# Patient Record
Sex: FEMALE | Race: WHITE | NOT HISPANIC OR LATINO | Employment: OTHER | URBAN - METROPOLITAN AREA
[De-identification: names, ages, dates, MRNs, and addresses within clinical notes are randomized per-mention and may not be internally consistent; named-entity substitution may affect disease eponyms.]

---

## 2023-10-11 ENCOUNTER — HOSPITAL ENCOUNTER (INPATIENT)
Facility: HOSPITAL | Age: 73
LOS: 9 days | Discharge: HOME | DRG: 737 | End: 2023-10-20
Attending: EMERGENCY MEDICINE | Admitting: INTERNAL MEDICINE
Payer: MEDICARE

## 2023-10-11 ENCOUNTER — APPOINTMENT (OUTPATIENT)
Dept: GENERAL RADIOLOGY | Facility: HOSPITAL | Age: 73
DRG: 737 | End: 2023-10-11
Payer: MEDICARE

## 2023-10-11 ENCOUNTER — APPOINTMENT (OUTPATIENT)
Dept: CT IMAGING | Facility: HOSPITAL | Age: 73
DRG: 737 | End: 2023-10-11
Payer: MEDICARE

## 2023-10-11 DIAGNOSIS — R18.8 OTHER ASCITES: ICD-10-CM

## 2023-10-11 DIAGNOSIS — R73.9 HYPERGLYCEMIA: ICD-10-CM

## 2023-10-11 DIAGNOSIS — K56.609 SBO (SMALL BOWEL OBSTRUCTION): ICD-10-CM

## 2023-10-11 DIAGNOSIS — R11.2 NAUSEA AND VOMITING, UNSPECIFIED VOMITING TYPE: ICD-10-CM

## 2023-10-11 DIAGNOSIS — K56.600 PARTIAL INTESTINAL OBSTRUCTION, UNSPECIFIED CAUSE: ICD-10-CM

## 2023-10-11 DIAGNOSIS — N83.8 OVARIAN MASS: ICD-10-CM

## 2023-10-11 DIAGNOSIS — N28.9 RENAL INSUFFICIENCY: ICD-10-CM

## 2023-10-11 DIAGNOSIS — R19.00 PELVIC MASS: Primary | ICD-10-CM

## 2023-10-11 PROBLEM — J44.9 COPD (CHRONIC OBSTRUCTIVE PULMONARY DISEASE): Status: ACTIVE | Noted: 2023-10-11

## 2023-10-11 PROBLEM — E11.9 TYPE 2 DIABETES MELLITUS: Status: ACTIVE | Noted: 2023-10-11

## 2023-10-11 PROBLEM — I50.21 ACUTE HFREF (HEART FAILURE WITH REDUCED EJECTION FRACTION): Status: ACTIVE | Noted: 2023-10-11

## 2023-10-11 PROBLEM — N18.30 CKD (CHRONIC KIDNEY DISEASE), STAGE III: Status: ACTIVE | Noted: 2023-10-11

## 2023-10-11 LAB
ALBUMIN SERPL-MCNC: 3.3 G/DL (ref 3.5–5.2)
ALBUMIN/GLOB SERPL: 0.8 G/DL
ALP SERPL-CCNC: 56 U/L (ref 39–117)
ALT SERPL W P-5'-P-CCNC: 13 U/L (ref 1–33)
ANION GAP SERPL CALCULATED.3IONS-SCNC: 20 MMOL/L (ref 5–15)
AST SERPL-CCNC: 31 U/L (ref 1–32)
BACTERIA UR QL AUTO: ABNORMAL /HPF
BASOPHILS # BLD AUTO: 0.03 10*3/MM3 (ref 0–0.2)
BASOPHILS NFR BLD AUTO: 0.2 % (ref 0–1.5)
BILIRUB SERPL-MCNC: 0.4 MG/DL (ref 0–1.2)
BILIRUB UR QL STRIP: ABNORMAL
BUN SERPL-MCNC: 58 MG/DL (ref 8–23)
BUN/CREAT SERPL: 33.7 (ref 7–25)
CALCIUM SPEC-SCNC: 8.5 MG/DL (ref 8.6–10.5)
CHLORIDE SERPL-SCNC: 91 MMOL/L (ref 98–107)
CLARITY UR: ABNORMAL
CO2 SERPL-SCNC: 22 MMOL/L (ref 22–29)
COLOR UR: ABNORMAL
CREAT BLDA-MCNC: 2.1 MG/DL
CREAT BLDA-MCNC: 2.1 MG/DL (ref 0.6–1.3)
CREAT SERPL-MCNC: 1.72 MG/DL (ref 0.57–1)
DEPRECATED RDW RBC AUTO: 45 FL (ref 37–54)
EGFRCR SERPLBLD CKD-EPI 2021: 31.3 ML/MIN/1.73
EOSINOPHIL # BLD AUTO: 0 10*3/MM3 (ref 0–0.4)
EOSINOPHIL NFR BLD AUTO: 0 % (ref 0.3–6.2)
ERYTHROCYTE [DISTWIDTH] IN BLOOD BY AUTOMATED COUNT: 15.2 % (ref 12.3–15.4)
GLOBULIN UR ELPH-MCNC: 3.9 GM/DL
GLUCOSE BLDC GLUCOMTR-MCNC: 310 MG/DL (ref 70–130)
GLUCOSE SERPL-MCNC: 358 MG/DL (ref 65–99)
GLUCOSE UR STRIP-MCNC: NEGATIVE MG/DL
HCT VFR BLD AUTO: 38.9 % (ref 34–46.6)
HGB BLD-MCNC: 12 G/DL (ref 12–15.9)
HGB UR QL STRIP.AUTO: ABNORMAL
HOLD SPECIMEN: NORMAL
HYALINE CASTS UR QL AUTO: ABNORMAL /LPF
IMM GRANULOCYTES # BLD AUTO: 0.13 10*3/MM3 (ref 0–0.05)
IMM GRANULOCYTES NFR BLD AUTO: 0.9 % (ref 0–0.5)
INR PPP: 1.36 (ref 0.89–1.12)
KETONES UR QL STRIP: ABNORMAL
LEUKOCYTE ESTERASE UR QL STRIP.AUTO: ABNORMAL
LIPASE SERPL-CCNC: 7 U/L (ref 13–60)
LYMPHOCYTES # BLD AUTO: 0.25 10*3/MM3 (ref 0.7–3.1)
LYMPHOCYTES NFR BLD AUTO: 1.7 % (ref 19.6–45.3)
MCH RBC QN AUTO: 25.4 PG (ref 26.6–33)
MCHC RBC AUTO-ENTMCNC: 30.8 G/DL (ref 31.5–35.7)
MCV RBC AUTO: 82.4 FL (ref 79–97)
MONOCYTES # BLD AUTO: 0.84 10*3/MM3 (ref 0.1–0.9)
MONOCYTES NFR BLD AUTO: 5.8 % (ref 5–12)
NEUTROPHILS NFR BLD AUTO: 13.15 10*3/MM3 (ref 1.7–7)
NEUTROPHILS NFR BLD AUTO: 91.4 % (ref 42.7–76)
NITRITE UR QL STRIP: NEGATIVE
NRBC BLD AUTO-RTO: 0 /100 WBC (ref 0–0.2)
NT-PROBNP SERPL-MCNC: 1889 PG/ML (ref 0–900)
PH UR STRIP.AUTO: <=5 [PH] (ref 5–8)
PLATELET # BLD AUTO: 585 10*3/MM3 (ref 140–450)
PMV BLD AUTO: 10.6 FL (ref 6–12)
POTASSIUM SERPL-SCNC: 4.5 MMOL/L (ref 3.5–5.2)
PROT SERPL-MCNC: 7.2 G/DL (ref 6–8.5)
PROT UR QL STRIP: ABNORMAL
PROTHROMBIN TIME: 16.9 SECONDS (ref 12.2–14.5)
QT INTERVAL: 364 MS
QTC INTERVAL: 505 MS
RBC # BLD AUTO: 4.72 10*6/MM3 (ref 3.77–5.28)
RBC # UR STRIP: ABNORMAL /HPF
REF LAB TEST METHOD: ABNORMAL
SODIUM SERPL-SCNC: 133 MMOL/L (ref 136–145)
SP GR UR STRIP: 1.02 (ref 1–1.03)
SQUAMOUS #/AREA URNS HPF: ABNORMAL /HPF
TROPONIN T SERPL HS-MCNC: 28 NG/L
UROBILINOGEN UR QL STRIP: ABNORMAL
WBC # UR STRIP: ABNORMAL /HPF
WBC NRBC COR # BLD: 14.4 10*3/MM3 (ref 3.4–10.8)
WHOLE BLOOD HOLD COAG: NORMAL
WHOLE BLOOD HOLD SPECIMEN: NORMAL

## 2023-10-11 PROCEDURE — 84484 ASSAY OF TROPONIN QUANT: CPT | Performed by: PHYSICIAN ASSISTANT

## 2023-10-11 PROCEDURE — 81001 URINALYSIS AUTO W/SCOPE: CPT | Performed by: PHYSICIAN ASSISTANT

## 2023-10-11 PROCEDURE — 74018 RADEX ABDOMEN 1 VIEW: CPT

## 2023-10-11 PROCEDURE — 83690 ASSAY OF LIPASE: CPT | Performed by: PHYSICIAN ASSISTANT

## 2023-10-11 PROCEDURE — 25010000002 ONDANSETRON PER 1 MG: Performed by: PHYSICIAN ASSISTANT

## 2023-10-11 PROCEDURE — 82565 ASSAY OF CREATININE: CPT

## 2023-10-11 PROCEDURE — 25010000002 ONDANSETRON PER 1 MG: Performed by: INTERNAL MEDICINE

## 2023-10-11 PROCEDURE — 85610 PROTHROMBIN TIME: CPT | Performed by: PHYSICIAN ASSISTANT

## 2023-10-11 PROCEDURE — 85025 COMPLETE CBC W/AUTO DIFF WBC: CPT | Performed by: PHYSICIAN ASSISTANT

## 2023-10-11 PROCEDURE — 71045 X-RAY EXAM CHEST 1 VIEW: CPT

## 2023-10-11 PROCEDURE — 25810000003 SODIUM CHLORIDE 0.9 % SOLUTION: Performed by: INTERNAL MEDICINE

## 2023-10-11 PROCEDURE — 80053 COMPREHEN METABOLIC PANEL: CPT | Performed by: PHYSICIAN ASSISTANT

## 2023-10-11 PROCEDURE — 82948 REAGENT STRIP/BLOOD GLUCOSE: CPT

## 2023-10-11 PROCEDURE — 25810000003 SODIUM CHLORIDE 0.9 % SOLUTION: Performed by: PHYSICIAN ASSISTANT

## 2023-10-11 PROCEDURE — 83880 ASSAY OF NATRIURETIC PEPTIDE: CPT | Performed by: PHYSICIAN ASSISTANT

## 2023-10-11 PROCEDURE — 86304 IMMUNOASSAY TUMOR CA 125: CPT | Performed by: INTERNAL MEDICINE

## 2023-10-11 PROCEDURE — 93005 ELECTROCARDIOGRAM TRACING: CPT | Performed by: PHYSICIAN ASSISTANT

## 2023-10-11 PROCEDURE — 74176 CT ABD & PELVIS W/O CONTRAST: CPT

## 2023-10-11 PROCEDURE — 99285 EMERGENCY DEPT VISIT HI MDM: CPT

## 2023-10-11 PROCEDURE — 25010000002 MORPHINE PER 10 MG: Performed by: INTERNAL MEDICINE

## 2023-10-11 RX ORDER — ALBUTEROL SULFATE 90 UG/1
2 AEROSOL, METERED RESPIRATORY (INHALATION) EVERY 4 HOURS PRN
COMMUNITY

## 2023-10-11 RX ORDER — NICOTINE POLACRILEX 4 MG
15 LOZENGE BUCCAL
Status: DISCONTINUED | OUTPATIENT
Start: 2023-10-11 | End: 2023-10-14

## 2023-10-11 RX ORDER — ONDANSETRON 2 MG/ML
4 INJECTION INTRAMUSCULAR; INTRAVENOUS ONCE
Status: COMPLETED | OUTPATIENT
Start: 2023-10-11 | End: 2023-10-11

## 2023-10-11 RX ORDER — INSULIN LISPRO 100 [IU]/ML
2-9 INJECTION, SOLUTION INTRAVENOUS; SUBCUTANEOUS
Status: DISCONTINUED | OUTPATIENT
Start: 2023-10-11 | End: 2023-10-12

## 2023-10-11 RX ORDER — DEXTROSE MONOHYDRATE 25 G/50ML
25 INJECTION, SOLUTION INTRAVENOUS
Status: DISCONTINUED | OUTPATIENT
Start: 2023-10-11 | End: 2023-10-20 | Stop reason: HOSPADM

## 2023-10-11 RX ORDER — HYDRALAZINE HYDROCHLORIDE 20 MG/ML
10 INJECTION INTRAMUSCULAR; INTRAVENOUS EVERY 6 HOURS PRN
Status: DISCONTINUED | OUTPATIENT
Start: 2023-10-11 | End: 2023-10-20 | Stop reason: HOSPADM

## 2023-10-11 RX ORDER — HEPARIN SODIUM 5000 [USP'U]/ML
5000 INJECTION, SOLUTION INTRAVENOUS; SUBCUTANEOUS EVERY 8 HOURS SCHEDULED
Status: DISCONTINUED | OUTPATIENT
Start: 2023-10-11 | End: 2023-10-15

## 2023-10-11 RX ORDER — DONEPEZIL HYDROCHLORIDE 5 MG/1
5 TABLET, FILM COATED ORAL NIGHTLY
Status: DISCONTINUED | OUTPATIENT
Start: 2023-10-11 | End: 2023-10-13

## 2023-10-11 RX ORDER — SODIUM CHLORIDE 0.9 % (FLUSH) 0.9 %
10 SYRINGE (ML) INJECTION EVERY 12 HOURS SCHEDULED
Status: DISCONTINUED | OUTPATIENT
Start: 2023-10-11 | End: 2023-10-20 | Stop reason: HOSPADM

## 2023-10-11 RX ORDER — LORATADINE 10 MG/1
10 TABLET ORAL DAILY
COMMUNITY

## 2023-10-11 RX ORDER — ONDANSETRON 2 MG/ML
4 INJECTION INTRAMUSCULAR; INTRAVENOUS EVERY 6 HOURS PRN
Status: DISCONTINUED | OUTPATIENT
Start: 2023-10-11 | End: 2023-10-20 | Stop reason: HOSPADM

## 2023-10-11 RX ORDER — BENZONATATE 100 MG/1
100 CAPSULE ORAL 3 TIMES DAILY PRN
COMMUNITY

## 2023-10-11 RX ORDER — PANTOPRAZOLE SODIUM 40 MG/1
40 TABLET, DELAYED RELEASE ORAL DAILY
Status: ON HOLD | COMMUNITY
End: 2023-10-13

## 2023-10-11 RX ORDER — SODIUM CHLORIDE 0.9 % (FLUSH) 0.9 %
10 SYRINGE (ML) INJECTION AS NEEDED
Status: DISCONTINUED | OUTPATIENT
Start: 2023-10-11 | End: 2023-10-20 | Stop reason: HOSPADM

## 2023-10-11 RX ORDER — SIMETHICONE 125 MG
125 TABLET,CHEWABLE ORAL EVERY 6 HOURS PRN
COMMUNITY

## 2023-10-11 RX ORDER — MONTELUKAST SODIUM 4 MG/1
1 TABLET, CHEWABLE ORAL 2 TIMES DAILY
COMMUNITY

## 2023-10-11 RX ORDER — DONEPEZIL HYDROCHLORIDE 5 MG/1
5 TABLET, FILM COATED ORAL NIGHTLY
COMMUNITY

## 2023-10-11 RX ORDER — FUROSEMIDE 40 MG/1
40 TABLET ORAL DAILY
Status: ON HOLD | COMMUNITY
End: 2023-10-20 | Stop reason: SDUPTHER

## 2023-10-11 RX ORDER — FAMOTIDINE 40 MG/1
40 TABLET, FILM COATED ORAL DAILY
COMMUNITY

## 2023-10-11 RX ORDER — CLONAZEPAM 0.5 MG/1
0.5 TABLET ORAL 2 TIMES DAILY
COMMUNITY

## 2023-10-11 RX ORDER — CHOLESTYRAMINE LIGHT 4 G/5.7G
1 POWDER, FOR SUSPENSION ORAL DAILY
Status: DISCONTINUED | OUTPATIENT
Start: 2023-10-11 | End: 2023-10-13

## 2023-10-11 RX ORDER — SODIUM CHLORIDE 9 MG/ML
50 INJECTION, SOLUTION INTRAVENOUS CONTINUOUS
Status: ACTIVE | OUTPATIENT
Start: 2023-10-11 | End: 2023-10-14

## 2023-10-11 RX ORDER — BACLOFEN 10 MG/1
10 TABLET ORAL 3 TIMES DAILY
COMMUNITY

## 2023-10-11 RX ORDER — MONTELUKAST SODIUM 10 MG/1
10 TABLET ORAL NIGHTLY
COMMUNITY

## 2023-10-11 RX ORDER — MORPHINE SULFATE 2 MG/ML
2 INJECTION, SOLUTION INTRAMUSCULAR; INTRAVENOUS EVERY 4 HOURS PRN
Status: DISCONTINUED | OUTPATIENT
Start: 2023-10-11 | End: 2023-10-15

## 2023-10-11 RX ORDER — FAMOTIDINE 20 MG/1
40 TABLET, FILM COATED ORAL DAILY
Status: DISCONTINUED | OUTPATIENT
Start: 2023-10-11 | End: 2023-10-12

## 2023-10-11 RX ORDER — SODIUM CHLORIDE 9 MG/ML
40 INJECTION, SOLUTION INTRAVENOUS AS NEEDED
Status: DISCONTINUED | OUTPATIENT
Start: 2023-10-11 | End: 2023-10-20 | Stop reason: HOSPADM

## 2023-10-11 RX ORDER — ACETAMINOPHEN 325 MG/1
650 TABLET ORAL EVERY 4 HOURS PRN
Status: DISCONTINUED | OUTPATIENT
Start: 2023-10-11 | End: 2023-10-14

## 2023-10-11 RX ORDER — SPIRONOLACTONE 25 MG/1
25 TABLET ORAL DAILY
COMMUNITY

## 2023-10-11 RX ORDER — IPRATROPIUM BROMIDE AND ALBUTEROL SULFATE 2.5; .5 MG/3ML; MG/3ML
3 SOLUTION RESPIRATORY (INHALATION) EVERY 4 HOURS PRN
Status: DISCONTINUED | OUTPATIENT
Start: 2023-10-11 | End: 2023-10-20 | Stop reason: HOSPADM

## 2023-10-11 RX ADMIN — ONDANSETRON 4 MG: 2 INJECTION INTRAMUSCULAR; INTRAVENOUS at 23:23

## 2023-10-11 RX ADMIN — SODIUM CHLORIDE 50 ML/HR: 9 INJECTION, SOLUTION INTRAVENOUS at 23:22

## 2023-10-11 RX ADMIN — ONDANSETRON 4 MG: 2 INJECTION INTRAMUSCULAR; INTRAVENOUS at 13:58

## 2023-10-11 RX ADMIN — ONDANSETRON 4 MG: 2 INJECTION INTRAMUSCULAR; INTRAVENOUS at 16:11

## 2023-10-11 RX ADMIN — MORPHINE SULFATE 2 MG: 2 INJECTION, SOLUTION INTRAMUSCULAR; INTRAVENOUS at 23:23

## 2023-10-11 RX ADMIN — Medication 10 ML: at 23:23

## 2023-10-11 RX ADMIN — SODIUM CHLORIDE 1000 ML: 9 INJECTION, SOLUTION INTRAVENOUS at 14:56

## 2023-10-11 NOTE — ED PROVIDER NOTES
Subjective   History of Present Illness  Pt is a 71 yo female presenting to ED with complaints of vomiting and abdominal distension. PMHx significant for CHF, COPD, Pacemaker, Defib, CKD and HTN. Pt and son providing hx. They explain she has had intermittent vaginal bleeding for the past 5 months. She has had gradually worsening abdominal pain with distension and vomiting over the last month. She had a recent CT scan several weeks ago showing large mass in uterine and ascites. Patient had a DNC 2 days ago by OB Dr. Bina Zamudio in [place] and waiting for biopsy result but reports recent positive cancer markers. Son reports her cardiologist only cleared her for the DNC and not a full hysterectomy. She came to [place] because no oncologist in [place]. She denies prior hx of known cancer. Her prior abdominal surgical hx includes cholecystectomy. She has had some SOB with her abdominal distension but denies CP, cough or fever. She is constipated and last pasted pebble like stool several days ago. She quit using tobacco in 1998 and denies ETOH or drug use.     History provided by:  Patient, medical records and relative (SON)      Review of Systems   Constitutional:  Positive for appetite change and fatigue. Negative for fever.   HENT:  Negative for congestion.    Eyes:  Negative for visual disturbance.   Respiratory:  Positive for shortness of breath. Negative for cough.    Cardiovascular:  Negative for chest pain and leg swelling.   Gastrointestinal:  Positive for abdominal distention, abdominal pain, constipation, nausea and vomiting. Negative for diarrhea and rectal pain.   Genitourinary:  Positive for flank pain. Negative for difficulty urinating, dysuria and frequency.   Neurological:  Positive for weakness (generalized). Negative for dizziness, numbness and headaches.   Psychiatric/Behavioral:  Negative for confusion.        Past Medical History:   Diagnosis Date    Arthritis     CHF (congestive heart  failure)     COPD (chronic obstructive pulmonary disease)     Diabetes mellitus     Hyperlipidemia     Renal disorder        Allergies   Allergen Reactions    Paxil [Paroxetine] Anxiety       Past Surgical History:   Procedure Laterality Date    CHOLECYSTECTOMY      DILATATION AND CURETTAGE         History reviewed. No pertinent family history.    Social History     Socioeconomic History    Marital status:    Tobacco Use    Smoking status: Former     Types: Cigarettes     Quit date:      Years since quittin.8    Smokeless tobacco: Never   Vaping Use    Vaping Use: Never used   Substance and Sexual Activity    Alcohol use: Not Currently    Drug use: Never    Sexual activity: Defer           Objective   Physical Exam  Vitals and nursing note reviewed.   Constitutional:       General: She is not in acute distress.     Appearance: She is well-developed.   HENT:      Head: Atraumatic.      Nose: Nose normal.   Eyes:      General: Lids are normal.      Conjunctiva/sclera: Conjunctivae normal.      Pupils: Pupils are equal, round, and reactive to light.   Cardiovascular:      Rate and Rhythm: Regular rhythm. Tachycardia present.      Heart sounds: Normal heart sounds.   Pulmonary:      Effort: Pulmonary effort is normal.      Breath sounds: Normal breath sounds. No wheezing.   Abdominal:      General: There is distension.      Palpations: Abdomen is soft.      Tenderness: There is generalized abdominal tenderness. There is right CVA tenderness and left CVA tenderness. There is no guarding or rebound.   Musculoskeletal:         General: No tenderness. Normal range of motion.      Cervical back: Normal range of motion and neck supple.   Skin:     General: Skin is warm and dry.      Findings: No erythema or rash.   Neurological:      Mental Status: She is alert and oriented to person, place, and time.      Sensory: No sensory deficit.   Psychiatric:         Speech: Speech normal.         Behavior: Behavior  normal.         Procedures           ED Course  ED Course as of 10/11/23 2312   Wed Oct 11, 2023   1355 Creatinine: 2.10  Changed CT abd/pelvis to without contrast [RT]      ED Course User Index  [RT] Nasrin Goodman PA      Recent Results (from the past 24 hour(s))   Comprehensive Metabolic Panel    Collection Time: 10/11/23  1:40 PM    Specimen: Blood   Result Value Ref Range    Glucose 358 (H) 65 - 99 mg/dL    BUN 58 (H) 8 - 23 mg/dL    Creatinine 1.72 (H) 0.57 - 1.00 mg/dL    Sodium 133 (L) 136 - 145 mmol/L    Potassium 4.5 3.5 - 5.2 mmol/L    Chloride 91 (L) 98 - 107 mmol/L    CO2 22.0 22.0 - 29.0 mmol/L    Calcium 8.5 (L) 8.6 - 10.5 mg/dL    Total Protein 7.2 6.0 - 8.5 g/dL    Albumin 3.3 (L) 3.5 - 5.2 g/dL    ALT (SGPT) 13 1 - 33 U/L    AST (SGOT) 31 1 - 32 U/L    Alkaline Phosphatase 56 39 - 117 U/L    Total Bilirubin 0.4 0.0 - 1.2 mg/dL    Globulin 3.9 gm/dL    A/G Ratio 0.8 g/dL    BUN/Creatinine Ratio 33.7 (H) 7.0 - 25.0    Anion Gap 20.0 (H) 5.0 - 15.0 mmol/L    eGFR 31.3 (L) >60.0 mL/min/1.73   Lipase    Collection Time: 10/11/23  1:40 PM    Specimen: Blood   Result Value Ref Range    Lipase 7 (L) 13 - 60 U/L   CBC Auto Differential    Collection Time: 10/11/23  1:40 PM    Specimen: Blood   Result Value Ref Range    WBC 14.40 (H) 3.40 - 10.80 10*3/mm3    RBC 4.72 3.77 - 5.28 10*6/mm3    Hemoglobin 12.0 12.0 - 15.9 g/dL    Hematocrit 38.9 34.0 - 46.6 %    MCV 82.4 79.0 - 97.0 fL    MCH 25.4 (L) 26.6 - 33.0 pg    MCHC 30.8 (L) 31.5 - 35.7 g/dL    RDW 15.2 12.3 - 15.4 %    RDW-SD 45.0 37.0 - 54.0 fl    MPV 10.6 6.0 - 12.0 fL    Platelets 585 (H) 140 - 450 10*3/mm3    Neutrophil % 91.4 (H) 42.7 - 76.0 %    Lymphocyte % 1.7 (L) 19.6 - 45.3 %    Monocyte % 5.8 5.0 - 12.0 %    Eosinophil % 0.0 (L) 0.3 - 6.2 %    Basophil % 0.2 0.0 - 1.5 %    Immature Grans % 0.9 (H) 0.0 - 0.5 %    Neutrophils, Absolute 13.15 (H) 1.70 - 7.00 10*3/mm3    Lymphocytes, Absolute 0.25 (L) 0.70 - 3.10 10*3/mm3    Monocytes, Absolute  0.84 0.10 - 0.90 10*3/mm3    Eosinophils, Absolute 0.00 0.00 - 0.40 10*3/mm3    Basophils, Absolute 0.03 0.00 - 0.20 10*3/mm3    Immature Grans, Absolute 0.13 (H) 0.00 - 0.05 10*3/mm3    nRBC 0.0 0.0 - 0.2 /100 WBC   Green Top (Gel)    Collection Time: 10/11/23  1:40 PM   Result Value Ref Range    Extra Tube Hold for add-ons.    Lavender Top    Collection Time: 10/11/23  1:40 PM   Result Value Ref Range    Extra Tube hold for add-on    Gold Top - SST    Collection Time: 10/11/23  1:40 PM   Result Value Ref Range    Extra Tube Hold for add-ons.    Gray Top    Collection Time: 10/11/23  1:40 PM   Result Value Ref Range    Extra Tube Hold for add-ons.    Light Blue Top    Collection Time: 10/11/23  1:40 PM   Result Value Ref Range    Extra Tube Hold for add-ons.    Protime-INR    Collection Time: 10/11/23  1:40 PM    Specimen: Blood   Result Value Ref Range    Protime 16.9 (H) 12.2 - 14.5 Seconds    INR 1.36 (H) 0.89 - 1.12   BNP    Collection Time: 10/11/23  1:40 PM    Specimen: Blood   Result Value Ref Range    proBNP 1,889.0 (H) 0.0 - 900.0 pg/mL   Single High Sensitivity Troponin T    Collection Time: 10/11/23  1:40 PM    Specimen: Blood   Result Value Ref Range    HS Troponin T 28 (H) <10 ng/L   Urinalysis With Microscopic If Indicated (No Culture) - Urine, Clean Catch    Collection Time: 10/11/23  1:44 PM    Specimen: Urine, Clean Catch   Result Value Ref Range    Color, UA Dark Yellow (A) Yellow, Straw    Appearance, UA Turbid (A) Clear    pH, UA <=5.0 5.0 - 8.0    Specific Gravity, UA 1.023 1.001 - 1.030    Glucose, UA Negative Negative    Ketones, UA Trace (A) Negative    Bilirubin, UA Moderate (2+) (A) Negative    Blood, UA Trace (A) Negative    Protein, UA 30 mg/dL (1+) (A) Negative    Leuk Esterase, UA Moderate (2+) (A) Negative    Nitrite, UA Negative Negative    Urobilinogen, UA 0.2 E.U./dL 0.2 - 1.0 E.U./dL   Urinalysis, Microscopic Only - Urine, Clean Catch    Collection Time: 10/11/23  1:44 PM     Specimen: Urine, Clean Catch   Result Value Ref Range    RBC, UA 3-6 (A) None Seen, 0-2 /HPF    WBC, UA Too Numerous to Count (A) None Seen, 0-2 /HPF    Bacteria, UA 1+ (A) None Seen, Trace /HPF    Squamous Epithelial Cells, UA 21-30 (A) None Seen, 0-2 /HPF    Hyaline Casts, UA 13-20 0 - 6 /LPF    Methodology Manual Light Microscopy    POC Creatinine    Collection Time: 10/11/23  1:47 PM    Specimen: Blood   Result Value Ref Range    Creatinine 2.10 (H) 0.60 - 1.30 mg/dL   ECG 12 Lead Rhythm Change    Collection Time: 10/11/23  1:48 PM   Result Value Ref Range    QT Interval 364 ms    QTC Interval 505 ms   POCT, Creatinine    Collection Time: 10/11/23  1:54 PM    Specimen: Blood   Result Value Ref Range    Creatinine 2.10 mg/dL   POC Glucose Once    Collection Time: 10/11/23  7:01 PM    Specimen: Blood   Result Value Ref Range    Glucose 310 (H) 70 - 130 mg/dL     Note: In addition to lab results from this visit, the labs listed above may include labs taken at another facility or during a different encounter within the last 24 hours. Please correlate lab times with ED admission and discharge times for further clarification of the services performed during this visit.    XR Abdomen KUB   Final Result   Impression:   There is nasogastric tube with tip in the body of the stomach         Electronically Signed: Tres Bauer MD     10/11/2023 6:26 PM CDT     Workstation ID: [id number]      CT Abdomen Pelvis Without Contrast   Final Result   Impression:   1.Large central abdominopelvic mass, likely arising from the right ovary.   2.Dilated small intestine with somewhat gradual transition in the right anterior lower abdomen near the after mentioned mass suggestive of at least partial mass effect/obstruction.   3.Small to moderate volume ascites with imaging features concerning for carcinomatosis.   4.Enlarged retroperitoneal lymph nodes, likely metastatic.                  Electronically Signed: Tres Bauer MD      10/11/2023 3:09 PM CDT     Workstation ID: [id number]      XR Chest 1 View   Final Result   Impression:   No acute cardiopulmonary process.         Electronically Signed: Albert Rangel MD     10/11/2023 2:46 PM EDT     Workstation ID: [id number]        Vitals:    10/11/23 1800 10/11/23 1858 10/11/23 2031 10/11/23 2100   BP: 126/65 148/91 129/79    BP Location:  Left arm Left arm    Patient Position:  Lying Lying    Pulse: 108 120  112   Resp:  18 18 18   Temp:  98.4 °F (36.9 °C) 98.4 °F (36.9 °C)    TempSrc:  Oral Oral    SpO2: 92% 93%  92%   Weight:       Height:         Medications   sodium chloride 0.9 % flush 10 mL (has no administration in time range)   cholestyramine light packet 4 g (0 g Oral Hold 10/11/23 2048)   donepezil (ARICEPT) tablet 5 mg (0 mg Oral Hold 10/11/23 2253)   famotidine (PEPCID) tablet 40 mg (0 mg Oral Hold 10/11/23 2048)   sodium chloride 0.9 % flush 10 mL (has no administration in time range)   sodium chloride 0.9 % flush 10 mL (has no administration in time range)   sodium chloride 0.9 % infusion 40 mL (has no administration in time range)   dextrose (GLUTOSE) oral gel 15 g (has no administration in time range)   dextrose (D50W) (25 g/50 mL) IV injection 25 g (has no administration in time range)   glucagon (GLUCAGEN) injection 1 mg (has no administration in time range)   Insulin Lispro (humaLOG) injection 2-9 Units (0 Units Subcutaneous Hold 10/11/23 2254)   heparin (porcine) 5000 UNIT/ML injection 5,000 Units (has no administration in time range)   sodium chloride 0.9 % infusion (has no administration in time range)   acetaminophen (TYLENOL) tablet 650 mg (has no administration in time range)   ondansetron (ZOFRAN) injection 4 mg (has no administration in time range)   hydrALAZINE (APRESOLINE) injection 10 mg (has no administration in time range)   morphine injection 2 mg (has no administration in time range)   ipratropium-albuterol (DUO-NEB) nebulizer solution 3 mL (has no  administration in time range)   ondansetron (ZOFRAN) injection 4 mg (4 mg Intravenous Given 10/11/23 1358)   sodium chloride 0.9 % bolus 1,000 mL (0 mL Intravenous Stopped 10/11/23 1611)   ondansetron (ZOFRAN) injection 4 mg (4 mg Intravenous Given 10/11/23 1611)     ECG/EMG Results (last 24 hours)       ** No results found for the last 24 hours. **          ECG 12 Lead Rhythm Change   Final Result   Test Reason : Rhythm Change   Blood Pressure :   */*   mmHG   Vent. Rate : 116 BPM     Atrial Rate : 116 BPM      P-R Int : 142 ms          QRS Dur :  96 ms       QT Int : 364 ms       P-R-T Axes :  49 -29 106 degrees      QTc Int : 505 ms      Atrial-sensed ventricular-paced rhythm   Biventricular pacemaker detected   Abnormal ECG   No previous ECGs available   Confirmed by PEGGY FLEMING MD ([id number]) on 10/11/2023 2:19:52 PM      Referred By:            Confirmed By: PEGGY FLEMING MD                                               Medical Decision Making  Pt is a 71 yo female presenting to ED with complaints of abdominal pain, distension and vomiting with vaginal bleeding. Labs notable for WBC 14, Cr 1.72, glucose 358, K 4.5, Na 133, HS Trop 28 and BNP 1,889. CT abd/pelvis with large pelvic mass and also concern for partial bowel obstruction as well as ascites and enlarged lymph nodes. Discussed results and tx plan for admission with patient and family. Will place NG tube in ED and admit to hospital.     Discussed patient with Dr. Fleming who is agreeable with ED course and tx plan    Discussed patient with hospitalist Dr. Lujan.     Consulted gynonc Dr. Draper and she recommends NG and will see in consult.     DDx  Metastatic cancer, SBO, UTI, Ascites, Cirrhosis, Renal failure, Sepsis     Problems Addressed:  Hyperglycemia: complicated acute illness or injury  Nausea and vomiting, unspecified vomiting type: complicated acute illness or injury  Other ascites: complicated acute illness or injury  Partial intestinal  obstruction, unspecified cause: complicated acute illness or injury  Pelvic mass: complicated acute illness or injury  Renal insufficiency: complicated acute illness or injury    Amount and/or Complexity of Data Reviewed  Independent Historian:      Details: Son  External Data Reviewed: notes.     Details: OB, PCP  Labs: ordered. Decision-making details documented in ED Course.  Radiology: ordered. Decision-making details documented in ED Course.  ECG/medicine tests: ordered. Decision-making details documented in ED Course.    Risk  Prescription drug management.  Decision regarding hospitalization.        Final diagnoses:   Pelvic mass   Partial intestinal obstruction, unspecified cause   Nausea and vomiting, unspecified vomiting type   Other ascites   Hyperglycemia   Renal insufficiency       ED Disposition  ED Disposition       ED Disposition   Decision to Admit    Condition   --    Comment   Level of Care: Telemetry [5]   Diagnosis: Ovarian mass [955513]   Admitting Physician: YAIR RAMIREZ [[id number]]   Certification: I Certify That Inpatient Hospital Services Are Medically Necessary For Greater Than 2 Midnights                 No follow-up provider specified.       Medication List      No changes were made to your prescriptions during this visit.            Nasrin Goodman PA  10/11/23 0310

## 2023-10-11 NOTE — H&P
[facility] Medicine Services  HISTORY AND PHYSICAL    Patient Name: Iqra Britt  : 1950  MRN: [id number]  Primary Care Physician: Georgie Fernández DO  Date of admission: 10/11/2023      Subjective   Subjective     Chief Complaint:  Nausea/vomiting, abdominal distention    HPI:  Iqra Britt is a 72 y.o. female with PMHx significant for HFrEF s/p ICD (LVEF 26%), HTN, CKDIII, Arthritis, DMII, COPD who presents to [facility] with complaints of abdominal distention and nausea/vomiting, abdominal pain. Patient reports in May she developed worsening vaginal bleeding, she underwent endometrial biopsy that was negative for cancer. She continued to have on/off symptoms and underwent D&C on Monday that reportedly showed abnormal fluid in her uterus, pathology is still pending. About three weeks ago she developed progressive abdominal swelling and distention and over the course of the last few days she has developed increasing pain and nausea. Son notes significant weight loss and poor appetite over the last several months. CT in the ED with SBO and large ovarian mass w/ascites.      Personal History     Past Medical History:   Diagnosis Date    Arthritis     CHF (congestive heart failure)     COPD (chronic obstructive pulmonary disease)     Diabetes mellitus     Hyperlipidemia     Renal disorder              Past Surgical History:   Procedure Laterality Date    CHOLECYSTECTOMY      DILATATION AND CURETTAGE         Family History: family history is not on file.     Social History:  reports that she quit smoking about 35 years ago. Her smoking use included cigarettes. She has never used smokeless tobacco. She reports that she does not currently use alcohol. She reports that she does not use drugs.  Social History     Social History Narrative    Not on file       Medications:  Available home medication information reviewed.  (Not in a hospital admission)      Allergies    Allergen Reactions    Paxil [Paroxetine] Anxiety       Objective   Objective     Vital Signs:   Temp:  [97.7 °F (36.5 °C)-98.3 °F (36.8 °C)] 98.3 °F (36.8 °C)  Heart Rate:  [103-125] 109  Resp:  [20] 20  BP: (119-133)/(73-91) 119/74       Physical Exam   Constitutional: Awake, alert  Eyes: PERRLA, sclerae anicteric, no conjunctival injection  HENT: NCAT, mucous membranes moist  Neck: Supple, no thyromegaly, no lymphadenopathy, trachea midline  Respiratory: Clear to auscultation bilaterally, nonlabored respirations   Cardiovascular: RRR, no murmurs, rubs, or gallops, palpable pedal pulses bilaterally  Gastrointestinal: significantly distended abdomen with ascites present, mostly non-tender  Musculoskeletal: No bilateral ankle edema, no clubbing or cyanosis to extremities  Psychiatric: Appropriate affect, cooperative  Neurologic: Oriented x 3, strength symmetric in all extremities, Cranial Nerves grossly intact to confrontation, speech clear  Skin: No rashes      Result Review:  I have personally reviewed the results from the time of this admission to 10/11/2023 17:22 EDT and agree with these findings:  [x]  Laboratory list / accordion  [x]  Microbiology  [x]  Radiology  [x]  EKG/Telemetry   [x]  Cardiology/Vascular   [x]  Pathology  [x]  Old records  []  Other:  Most notable findings include:         LAB RESULTS:      Lab 10/11/23  1340   WBC 14.40*   HEMOGLOBIN 12.0   HEMATOCRIT 38.9   PLATELETS 585*   NEUTROS ABS 13.15*   IMMATURE GRANS (ABS) 0.13*   LYMPHS ABS 0.25*   MONOS ABS 0.84   EOS ABS 0.00   MCV 82.4   PROTIME 16.9*   INR 1.36*         Lab 10/11/23  1354 10/11/23  1347 10/11/23  1340   SODIUM  --   --  133*   POTASSIUM  --   --  4.5   CHLORIDE  --   --  91*   CO2  --   --  22.0   ANION GAP  --   --  20.0*   BUN  --   --  58*   CREATININE 2.10 2.10* 1.72*   EGFR  --   --  31.3*   GLUCOSE  --   --  358*   CALCIUM  --   --  8.5*         Lab 10/11/23  1340   TOTAL PROTEIN 7.2   ALBUMIN 3.3*   GLOBULIN 3.9    ALT (SGPT) 13   AST (SGOT) 31   BILIRUBIN 0.4   ALK PHOS 56   LIPASE 7*         Lab 10/11/23  1340   PROBNP 1,889.0*   HSTROP T 28*                 UA          10/11/2023    13:44   Urinalysis   Squamous Epithelial Cells, UA 21-30    Specific Gravity, UA 1.023    Ketones, UA Trace    Blood, UA Trace    Leukocytes, UA Moderate (2+)    Nitrite, UA Negative    RBC, UA 3-6    WBC, UA Too Numerous to Count    Bacteria, UA 1+        Microbiology Results (last 10 days)       ** No results found for the last 240 hours. **            CT Abdomen Pelvis Without Contrast    Result Date: 10/11/2023  CT ABDOMEN PELVIS WO CONTRAST Date of Exam: 10/11/2023 2:43 PM CDT Indication: abd pain, distension, recent dx of uterine mass, Cr 2.1. Comparison: None available. Technique: Axial CT images were obtained of the abdomen and pelvis without the administration of contrast. Reconstructed coronal and sagittal images were also obtained. Automated exposure control and iterative construction methods were used. Findings: LUNG BASES:  Unremarkable without mass or infiltrate. LIVER:  Unremarkable parenchyma without focal lesion. BILIARY/GALLBLADDER: Cholecystectomy SPLEEN:  Unremarkable PANCREAS:  Unremarkable ADRENAL:  Unremarkable KIDNEYS:  Unremarkable parenchyma with no solid mass identified. No obstruction.  No calculus identified. GASTROINTESTINAL/MESENTERY: Dilated small intestine measuring up to 4 cm in diameter with somewhat gradual tapering in the right central lower abdomen in the region of abdominopelvic mass. There is likely an element of mass effect/partial obstruction related to the mass. Distal small intestine is decompressed. There is a moderate proximal and mid fecal load. AORTA/IVC:  Normal caliber. RETROPERITONEUM/LYMPH NODES: There are enlarged retroperitoneal lymph nodes including: *2.2 x 2.0 cm left para-aortic mid abdominal node (image 64, series 2) *1.5 x 1.4 cm left para-aortic lower abdominal node (image 70, series  2) REPRODUCTIVE: There is a heterogeneous 14.9 x 11.7 cm mass within the central upper pelvis/lower abdomen. While this is adjacent to the uterine fundus it does not appear to be in direct communication and is most suggestive of an ovarian neoplasm likely arising from the right ovary. There is a heterogeneously enlarged left ovary measuring 5.0 x 4.6 cm. BLADDER:  Unremarkable OSSEUS STRUCTURES:  Typical for age with no acute process identified. There is small to moderate volume ascites. There are areas of mesenteric spider webbing/infiltration with some suggestion of nodularity worrisome for carcinomatosis. However, this is difficult to assess given lack of intravenous contrast.     Impression: Impression: 1.Large central abdominopelvic mass, likely arising from the right ovary. 2.Dilated small intestine with somewhat gradual transition in the right anterior lower abdomen near the after mentioned mass suggestive of at least partial mass effect/obstruction. 3.Small to moderate volume ascites with imaging features concerning for carcinomatosis. 4.Enlarged retroperitoneal lymph nodes, likely metastatic. Electronically Signed: Tres Bauer MD  10/11/2023 3:09 PM CDT  Workstation ID: [id number]    XR Chest 1 View    Result Date: 10/11/2023  XR CHEST 1 VW Date of Exam: 10/11/2023 2:10 PM EDT Indication: abd pain, distension, recent dx of uterine cancer, ascites Comparison: None available. Findings: A left subclavian ICD is in place. The lungs are clear. The heart and mediastinal contours appear normal. There is no pleural effusion. The pulmonary vasculature appears normal. The osseous structures appear intact.     Impression: Impression: No acute cardiopulmonary process. Electronically Signed: Albert Rangel MD  10/11/2023 2:46 PM EDT  Workstation ID: [id number]         Assessment & Plan   Assessment & Plan     Active Hospital Problems    Diagnosis  POA    **Ovarian mass [N83.8]  Yes    COPD (chronic obstructive pulmonary  disease) [J44.9]  Yes    Acute HFrEF (heart failure with reduced ejection fraction) [I50.21]  Yes    CKD (chronic kidney disease), stage III [N18.30]  Yes     Iqra Britt is a 72 y.o. female with PMHx significant for HFrEF/NICM s/p BiV ICD LVEF 26%), HTN, CKDIII, Arthritis, DMII, COPD who presents to [facility] with complaints of abdominal distention and nausea/vomiting, abdominal pain.    SBO:  - secondary to large ovarian mass  - Dr. Draper to evaluate, recommends placing NGT in ED  - NPO, gentle IVF for now  - PRN pain control    Large Pelvic Mass w/Ascites and concern for Carcinomatosis and metastatic LAD  - , CEA, CA-125 all significantly elevated per review of OSH records, recheck CA-125  - concern for primary Ovarian Malignancy w/Malignant Ascites  - Dr. Draper to evaluate, may need surgical debulking?   - recent negative endometrial bx 5/2023 and recent D&C Monday w/path still pending  - given her comorbidities, she is not a great surgical candidate    CKDIII  - baseline appears to be around 1.5?  - monitor closely  - avoid nephrotoxins, hold lasix while NPO    HFrEF/NICM s/p BiV ICD (LVEF 26% 9/2023)  - followed by Cardiology at   - does not appear to be on GDMT, will hold lasix for now due to NPO/SBO, need to have pharmacy clarify med rec, looks like she may have recently been started on Entresto, Ibravadine?  - monitor for volume overload, appears euvolemic currently    Possible UTI:  - sample contaminated with significant squamous cells, will repeat and treat if indicated    COPD:  - not in exacerbation  - PRN duo-nebs    DMII:  - SSI coverage while NPO    Total time spent: 80 minutes  Time spent includes time reviewing chart, face-to-face time, counseling patient/family/caregiver, ordering medications/tests/procedures, communicating with other health care professionals, documenting clinical information in the electronic health record, and coordination of care.       DVT prophylaxis:   Heparin      CODE STATUS:  we discussed this extensively and she chooses to be full code at this time  Code Status and Medical Interventions:   Ordered at: 10/11/23 0831     Level Of Support Discussed With:    Patient     Code Status (Patient has no pulse and is not breathing):    CPR (Attempt to Resuscitate)     Medical Interventions (Patient has pulse or is breathing):    Full Support       Expected Discharge   Expected discharge date/ time has not been documented.     Judy Lujan, DO  10/11/23

## 2023-10-12 ENCOUNTER — APPOINTMENT (OUTPATIENT)
Dept: ULTRASOUND IMAGING | Facility: HOSPITAL | Age: 73
DRG: 737 | End: 2023-10-12
Payer: MEDICARE

## 2023-10-12 LAB
ALBUMIN FLD-MCNC: 2.2 G/DL
ALBUMIN SERPL-MCNC: 2.5 G/DL (ref 3.5–5.2)
ALBUMIN/GLOB SERPL: 0.7 G/DL
ALP SERPL-CCNC: 46 U/L (ref 39–117)
ALT SERPL W P-5'-P-CCNC: 10 U/L (ref 1–33)
ANION GAP SERPL CALCULATED.3IONS-SCNC: 12 MMOL/L (ref 5–15)
APPEARANCE FLD: ABNORMAL
AST SERPL-CCNC: 30 U/L (ref 1–32)
BACTERIA UR QL AUTO: ABNORMAL /HPF
BASOPHILS # BLD MANUAL: 0 10*3/MM3 (ref 0–0.2)
BASOPHILS NFR BLD MANUAL: 0 % (ref 0–1.5)
BILIRUB SERPL-MCNC: 0.4 MG/DL (ref 0–1.2)
BILIRUB UR QL STRIP: NEGATIVE
BUN SERPL-MCNC: 58 MG/DL (ref 8–23)
BUN/CREAT SERPL: 38.2 (ref 7–25)
CALCIUM SPEC-SCNC: 7.9 MG/DL (ref 8.6–10.5)
CANCER AG125 SERPL QL: 776 U/ML (ref 0–38.1)
CHLORIDE SERPL-SCNC: 95 MMOL/L (ref 98–107)
CLARITY UR: ABNORMAL
CLUMPED PLATELETS: PRESENT
CO2 SERPL-SCNC: 23 MMOL/L (ref 22–29)
COLOR FLD: ABNORMAL
COLOR UR: YELLOW
CREAT SERPL-MCNC: 1.52 MG/DL (ref 0.57–1)
DEPRECATED RDW RBC AUTO: 44.8 FL (ref 37–54)
EGFRCR SERPLBLD CKD-EPI 2021: 36.3 ML/MIN/1.73
EOSINOPHIL # BLD MANUAL: 0 10*3/MM3 (ref 0–0.4)
EOSINOPHIL NFR BLD MANUAL: 0 % (ref 0.3–6.2)
ERYTHROCYTE [DISTWIDTH] IN BLOOD BY AUTOMATED COUNT: 15.4 % (ref 12.3–15.4)
GLOBULIN UR ELPH-MCNC: 3.6 GM/DL
GLUCOSE BLDC GLUCOMTR-MCNC: 177 MG/DL (ref 70–130)
GLUCOSE BLDC GLUCOMTR-MCNC: 208 MG/DL (ref 70–130)
GLUCOSE BLDC GLUCOMTR-MCNC: 309 MG/DL (ref 70–130)
GLUCOSE BLDC GLUCOMTR-MCNC: 313 MG/DL (ref 70–130)
GLUCOSE BLDC GLUCOMTR-MCNC: 322 MG/DL (ref 70–130)
GLUCOSE SERPL-MCNC: 285 MG/DL (ref 65–99)
GLUCOSE UR STRIP-MCNC: NEGATIVE MG/DL
HBA1C MFR BLD: 8.8 % (ref 4.8–5.6)
HCT VFR BLD AUTO: 34.2 % (ref 34–46.6)
HGB BLD-MCNC: 10.8 G/DL (ref 12–15.9)
HGB UR QL STRIP.AUTO: ABNORMAL
HYALINE CASTS UR QL AUTO: ABNORMAL /LPF
KETONES UR QL STRIP: ABNORMAL
LEUKOCYTE ESTERASE UR QL STRIP.AUTO: ABNORMAL
LYMPHOCYTES # BLD MANUAL: 0.48 10*3/MM3 (ref 0.7–3.1)
LYMPHOCYTES NFR BLD MANUAL: 4 % (ref 5–12)
LYMPHOCYTES NFR FLD MANUAL: 14 %
MACROPHAGE FLUID: 39 %
MCH RBC QN AUTO: 25.8 PG (ref 26.6–33)
MCHC RBC AUTO-ENTMCNC: 31.6 G/DL (ref 31.5–35.7)
MCV RBC AUTO: 81.6 FL (ref 79–97)
MESOTHL CELL NFR FLD MANUAL: 26 %
MONOCYTES # BLD: 0.38 10*3/MM3 (ref 0.1–0.9)
MONOCYTES NFR FLD: 1 %
NEUTROPHILS # BLD AUTO: 8.67 10*3/MM3 (ref 1.7–7)
NEUTROPHILS NFR BLD MANUAL: 87 % (ref 42.7–76)
NEUTROPHILS NFR FLD MANUAL: 20 %
NEUTS BAND NFR BLD MANUAL: 4 % (ref 0–5)
NITRITE UR QL STRIP: NEGATIVE
PH UR STRIP.AUTO: 5.5 [PH] (ref 5–8)
PLATELET # BLD AUTO: 450 10*3/MM3 (ref 140–450)
PMV BLD AUTO: 10.2 FL (ref 6–12)
POTASSIUM SERPL-SCNC: 4 MMOL/L (ref 3.5–5.2)
PROT FLD-MCNC: 3.9 G/DL
PROT SERPL-MCNC: 6.1 G/DL (ref 6–8.5)
PROT UR QL STRIP: ABNORMAL
RBC # BLD AUTO: 4.19 10*6/MM3 (ref 3.77–5.28)
RBC # FLD AUTO: ABNORMAL /MM3
RBC # UR STRIP: ABNORMAL /HPF
RBC MORPH BLD: NORMAL
REF LAB TEST METHOD: ABNORMAL
SMALL PLATELETS BLD QL SMEAR: ADEQUATE
SODIUM SERPL-SCNC: 130 MMOL/L (ref 136–145)
SP GR UR STRIP: 1.02 (ref 1–1.03)
SQUAMOUS #/AREA URNS HPF: ABNORMAL /HPF
UROBILINOGEN UR QL STRIP: ABNORMAL
VARIANT LYMPHS NFR BLD MANUAL: 5 % (ref 19.6–45.3)
WBC # FLD AUTO: 1119 /MM3
WBC # UR STRIP: ABNORMAL /HPF
WBC MORPH BLD: NORMAL
WBC NRBC COR # BLD: 9.53 10*3/MM3 (ref 3.4–10.8)

## 2023-10-12 PROCEDURE — 87070 CULTURE OTHR SPECIMN AEROBIC: CPT | Performed by: HOSPITALIST

## 2023-10-12 PROCEDURE — 76942 ECHO GUIDE FOR BIOPSY: CPT

## 2023-10-12 PROCEDURE — 63710000001 INSULIN DETEMIR PER 5 UNITS: Performed by: HOSPITALIST

## 2023-10-12 PROCEDURE — 82948 REAGENT STRIP/BLOOD GLUCOSE: CPT

## 2023-10-12 PROCEDURE — 25010000002 MORPHINE PER 10 MG: Performed by: INTERNAL MEDICINE

## 2023-10-12 PROCEDURE — 82042 OTHER SOURCE ALBUMIN QUAN EA: CPT | Performed by: HOSPITALIST

## 2023-10-12 PROCEDURE — 87086 URINE CULTURE/COLONY COUNT: CPT | Performed by: INTERNAL MEDICINE

## 2023-10-12 PROCEDURE — 25810000003 SODIUM CHLORIDE 0.9 % SOLUTION: Performed by: INTERNAL MEDICINE

## 2023-10-12 PROCEDURE — 87075 CULTR BACTERIA EXCEPT BLOOD: CPT | Performed by: HOSPITALIST

## 2023-10-12 PROCEDURE — 83036 HEMOGLOBIN GLYCOSYLATED A1C: CPT | Performed by: HOSPITALIST

## 2023-10-12 PROCEDURE — 81001 URINALYSIS AUTO W/SCOPE: CPT | Performed by: INTERNAL MEDICINE

## 2023-10-12 PROCEDURE — 63710000001 INSULIN REGULAR HUMAN PER 5 UNITS: Performed by: HOSPITALIST

## 2023-10-12 PROCEDURE — 63710000001 INSULIN LISPRO (HUMAN) PER 5 UNITS: Performed by: INTERNAL MEDICINE

## 2023-10-12 PROCEDURE — 84157 ASSAY OF PROTEIN OTHER: CPT | Performed by: HOSPITALIST

## 2023-10-12 PROCEDURE — 85007 BL SMEAR W/DIFF WBC COUNT: CPT | Performed by: INTERNAL MEDICINE

## 2023-10-12 PROCEDURE — 99223 1ST HOSP IP/OBS HIGH 75: CPT | Performed by: OBSTETRICS & GYNECOLOGY

## 2023-10-12 PROCEDURE — 89051 BODY FLUID CELL COUNT: CPT | Performed by: HOSPITALIST

## 2023-10-12 PROCEDURE — 87205 SMEAR GRAM STAIN: CPT | Performed by: HOSPITALIST

## 2023-10-12 PROCEDURE — 25010000002 LORAZEPAM PER 2 MG: Performed by: HOSPITALIST

## 2023-10-12 PROCEDURE — 87206 SMEAR FLUORESCENT/ACID STAI: CPT | Performed by: HOSPITALIST

## 2023-10-12 PROCEDURE — 25010000002 HEPARIN (PORCINE) PER 1000 UNITS: Performed by: INTERNAL MEDICINE

## 2023-10-12 PROCEDURE — 87102 FUNGUS ISOLATION CULTURE: CPT | Performed by: HOSPITALIST

## 2023-10-12 PROCEDURE — 85027 COMPLETE CBC AUTOMATED: CPT | Performed by: INTERNAL MEDICINE

## 2023-10-12 PROCEDURE — 97161 PT EVAL LOW COMPLEX 20 MIN: CPT

## 2023-10-12 PROCEDURE — 80053 COMPREHEN METABOLIC PANEL: CPT | Performed by: INTERNAL MEDICINE

## 2023-10-12 PROCEDURE — 87015 SPECIMEN INFECT AGNT CONCNTJ: CPT | Performed by: HOSPITALIST

## 2023-10-12 PROCEDURE — 85060 BLOOD SMEAR INTERPRETATION: CPT | Performed by: HOSPITALIST

## 2023-10-12 RX ORDER — SODIUM PHOSPHATE,MONO-DIBASIC 19G-7G/118
1 ENEMA (ML) RECTAL DAILY
Status: DISCONTINUED | OUTPATIENT
Start: 2023-10-12 | End: 2023-10-15

## 2023-10-12 RX ORDER — LIDOCAINE HYDROCHLORIDE 10 MG/ML
10 INJECTION, SOLUTION EPIDURAL; INFILTRATION; INTRACAUDAL; PERINEURAL ONCE
Status: COMPLETED | OUTPATIENT
Start: 2023-10-12 | End: 2023-10-12

## 2023-10-12 RX ORDER — LORAZEPAM 2 MG/ML
0.5 INJECTION INTRAMUSCULAR EVERY 12 HOURS PRN
Status: DISCONTINUED | OUTPATIENT
Start: 2023-10-12 | End: 2023-10-14

## 2023-10-12 RX ORDER — FAMOTIDINE 20 MG/1
20 TABLET, FILM COATED ORAL DAILY
Status: DISCONTINUED | OUTPATIENT
Start: 2023-10-13 | End: 2023-10-13

## 2023-10-12 RX ORDER — DEXTROSE MONOHYDRATE 25 G/50ML
25 INJECTION, SOLUTION INTRAVENOUS
Status: DISCONTINUED | OUTPATIENT
Start: 2023-10-12 | End: 2023-10-14

## 2023-10-12 RX ADMIN — Medication 10 ML: at 21:58

## 2023-10-12 RX ADMIN — HEPARIN SODIUM 5000 UNITS: 5000 INJECTION INTRAVENOUS; SUBCUTANEOUS at 05:41

## 2023-10-12 RX ADMIN — DONEPEZIL HYDROCHLORIDE 5 MG: 5 TABLET, FILM COATED ORAL at 21:58

## 2023-10-12 RX ADMIN — INSULIN LISPRO 7 UNITS: 100 INJECTION, SOLUTION INTRAVENOUS; SUBCUTANEOUS at 08:48

## 2023-10-12 RX ADMIN — SODIUM PHOSPHATE 1 ENEMA: 7; 19 ENEMA RECTAL at 15:58

## 2023-10-12 RX ADMIN — LIDOCAINE HYDROCHLORIDE 10 ML: 10 INJECTION, SOLUTION EPIDURAL; INFILTRATION; INTRACAUDAL; PERINEURAL at 11:37

## 2023-10-12 RX ADMIN — INSULIN HUMAN 4 UNITS: 100 INJECTION, SOLUTION PARENTERAL at 13:10

## 2023-10-12 RX ADMIN — HEPARIN SODIUM 5000 UNITS: 5000 INJECTION INTRAVENOUS; SUBCUTANEOUS at 14:28

## 2023-10-12 RX ADMIN — SODIUM CHLORIDE 50 ML/HR: 9 INJECTION, SOLUTION INTRAVENOUS at 21:57

## 2023-10-12 RX ADMIN — FAMOTIDINE 40 MG: 20 TABLET ORAL at 08:48

## 2023-10-12 RX ADMIN — Medication 10 ML: at 08:49

## 2023-10-12 RX ADMIN — HEPARIN SODIUM 5000 UNITS: 5000 INJECTION INTRAVENOUS; SUBCUTANEOUS at 21:58

## 2023-10-12 RX ADMIN — LORAZEPAM 0.5 MG: 2 INJECTION INTRAMUSCULAR; INTRAVENOUS at 21:58

## 2023-10-12 RX ADMIN — MORPHINE SULFATE 2 MG: 2 INJECTION, SOLUTION INTRAMUSCULAR; INTRAVENOUS at 21:58

## 2023-10-12 RX ADMIN — CHOLESTYRAMINE 4 G: 4 POWDER, FOR SUSPENSION ORAL at 08:48

## 2023-10-12 RX ADMIN — INSULIN DETEMIR 10 UNITS: 100 INJECTION, SOLUTION SUBCUTANEOUS at 21:58

## 2023-10-12 NOTE — THERAPY EVALUATION
Patient Name: Iqra Britt  : 1950    MRN: [id number]                              Today's Date: 10/12/2023       Admit Date: 10/11/2023    Visit Dx:     ICD-10-CM ICD-9-CM   1. Pelvic mass  R19.00 789.30   2. Partial intestinal obstruction, unspecified cause  K56.600 560.9   3. Nausea and vomiting, unspecified vomiting type  R11.2 787.01   4. Other ascites  R18.8 789.59   5. Hyperglycemia  R73.9 790.29   6. Renal insufficiency  N28.9 593.9     Patient Active Problem List   Diagnosis    Ovarian mass    COPD (chronic obstructive pulmonary disease)    Acute HFrEF (heart failure with reduced ejection fraction)    CKD (chronic kidney disease), stage III    Type 2 diabetes mellitus     Past Medical History:   Diagnosis Date    Arthritis     CHF (congestive heart failure)     COPD (chronic obstructive pulmonary disease)     Diabetes mellitus     Hyperlipidemia     Renal disorder      Past Surgical History:   Procedure Laterality Date    CHOLECYSTECTOMY      DILATATION AND CURETTAGE        General Information       Row Name 10/12/23 1344          Physical Therapy Time and Intention    Document Type evaluation  -     Mode of Treatment physical therapy  -       Row Name 10/12/23 1346          General Information    Patient Profile Reviewed yes  -     Prior Level of Function independent:;all household mobility;gait;transfer;bed mobility;dressing;driving  Uses rollator at baseline.  -     Existing Precautions/Restrictions fall;NPO;other (see comments)  NG tube  -     Barriers to Rehab medically complex;previous functional deficit  -       Row Name 10/12/23 1347          Living Environment    People in Home child(rachel), adult  1 son stays during the week and other son stays during the weekend  -       Row Name 10/12/23 1340          Home Main Entrance    Number of Stairs, Main Entrance none  -       Row Name 10/12/23 1340          Stairs Within Home, Primary    Number of Stairs, Within Home,  Primary none  -       Row Name 10/12/23 1340          Cognition    Orientation Status (Cognition) oriented to;person;time;verbal cues/prompts needed for orientation;place  Pt knew she was at hospital but required cues for the name  -[facility] Name 10/12/23 1340          Safety Issues, Functional Mobility    Safety Issues Affecting Function (Mobility) awareness of need for assistance;insight into deficits/self-awareness;safety precaution awareness;safety precautions follow-through/compliance;sequencing abilities  [facility]     Impairments Affecting Function (Mobility) balance;endurance/activity tolerance;shortness of breath;strength  [facility]               User Key  (r) = Recorded By, (t) = Taken By, (c) = Cosigned By      Initials Name Provider Type     Diana Reis PT Physical Therapist                   Mobility       [facility] Name 10/12/23 1343          Bed Mobility    Bed Mobility supine-sit;sit-supine  -     Supine-Sit [place] (Bed Mobility) contact guard;verbal cues  [facility]     Sit-Supine [place] (Bed Mobility) standby assist;verbal cues  [facility]     Assistive Device (Bed Mobility) bed rails;head of bed elevated  -     Comment, (Bed Mobility) VCs for hand placement and sequencing. Educated on log rolling to decrease abdominal discomfort  -[facility] Name 10/12/23 1343          Transfers    Comment, (Transfers) VCs for hand placement and sequencing with FWW. Cues to bring FWW back when sitting down as pt tends to leave it behind  -[facility] Name 10/12/23 1343          Sit-Stand Transfer    Sit-Stand [place] (Transfers) contact guard;verbal cues  [facility]     Assistive Device (Sit-Stand Transfers) walker, front-wheeled  -     Comment, (Sit-Stand Transfer) STS x1 from EOB, x1 from toilet  -[facility] Name 10/12/23 1343          Gait/Stairs (Locomotion)    [place] Level (Gait) contact guard;verbal cues  [facility]     Assistive Device (Gait) walker, front-wheeled  -     Distance in Feet (Gait) 180  -      Deviations/Abnormal Patterns (Gait) bilateral deviations;base of support, narrow;bhavana decreased;gait speed decreased;stride length decreased  -     Bilateral Gait Deviations heel strike decreased  -     Comment, (Gait/Stairs) Pt demo'd step through gait pattern w/ decreased speed. VCs for upright posture and correct AD positioning. No LOB noted w/ FWW. Pt ambulated into bathroom w/o FWW and was mildly unsteady. Recommended use of FWW with OOB mobility. Further distance limited by fatigue and SOA.  -               User Key  (r) = Recorded By, (t) = Taken By, (c) = Cosigned By      Initials Name Provider Type     Diana Reis PT Physical Therapist                   Obj/Interventions       Row Name 10/12/23 6842          Range of Motion Comprehensive    General Range of Motion bilateral lower extremity ROM WFL  -[facility] Name 10/12/23 3988          Strength Comprehensive (MMT)    General Manual Muscle Testing (MMT) Assessment lower extremity strength deficits identified  -     Comment, General Manual Muscle Testing (MMT) Assessment B LE grossly 4+/5  -[facility] Name 10/12/23 0812          Balance    Balance Assessment sitting static balance;sitting dynamic balance;sit to stand dynamic balance;standing static balance;standing dynamic balance  -     Static Sitting Balance standby assist  -     Dynamic Sitting Balance contact guard  -     Position, Sitting Balance unsupported;sitting edge of bed  -     Sit to Stand Dynamic Balance contact guard;verbal cues  -     Static Standing Balance standby assist;verbal cues  -     Dynamic Standing Balance contact guard;verbal cues  -     Position/Device Used, Standing Balance supported;walker, front-wheeled  -     Comment, Balance Pt stood at toilet to complete independent pericare w/ CGA w/o LOB noted  -       Row Name 10/12/23 7074          Sensory Assessment (Somatosensory)    Sensory Assessment (Somatosensory) LE sensation intact  -                User Key  (r) = Recorded By, (t) = Taken By, (c) = Cosigned By      Initials Name Provider Type     Diana Reis, PT Physical Therapist                   Goals/Plan       Row Name 10/12/23 [facility]5          Bed Mobility Goal 1 (PT)    Activity/Assistive Device (Bed Mobility Goal 1, PT) sit to supine/supine to sit  -     [place] Level/Cues Needed (Bed Mobility Goal 1, PT) independent  -     Time Frame (Bed Mobility Goal 1, PT) long term goal (LTG);10 days  -       Row Name 10/12/23 [facility]5          Transfer Goal 1 (PT)    Activity/Assistive Device (Transfer Goal 1, PT) sit-to-stand/stand-to-sit;bed-to-chair/chair-to-bed  -     [place] Level/Cues Needed (Transfer Goal 1, PT) independent  -     Time Frame (Transfer Goal 1, PT) long term goal (LTG);10 days  -[facility] Name 10/12/23 [facility]          Gait Training Goal 1 (PT)    Activity/Assistive Device (Gait Training Goal 1, PT) gait (walking locomotion);assistive device use  -     [place] Level (Gait Training Goal 1, PT) standby assist  -     Distance (Gait Training Goal 1, PT) 350 ft  -     Time Frame (Gait Training Goal 1, PT) long term goal (LTG);10 days  -[facility] Name 10/12/23 [facility]          Therapy Assessment/Plan (PT)    Planned Therapy Interventions (PT) balance training;bed mobility training;gait training;home exercise program;neuromuscular re-education;patient/family education;postural re-education;ROM (range of motion);strengthening;stretching;transfer training  -               User Key  (r) = Recorded By, (t) = Taken By, (c) = Cosigned By      Initials Name Provider Type     Diana Reis, PT Physical Therapist                   Clinical Impression       Row Name 10/12/23 1351          Pain    Pretreatment Pain Rating 0/10 - no pain  -     Posttreatment Pain Rating 0/10 - no pain  -       Row Name 10/12/23 [facility]          Plan of Care Review    Plan of Care Reviewed With patient;son;family  [facility]      Outcome Evaluation PT evaluation completed. Pt presents below baseline function d/t deficits in balance, strength, and activity tolerance. Pt ambulated 180 ft w/ CGA and using a FWW. No LOB noted w/ FWW but pt was mildly unsteady ambulating w/o. Pt would benefit from skilled IP PT. Recommend home w/ assist and HHPT at d/c.  -       Row Name 10/12/23 1351          Therapy Assessment/Plan (PT)    Patient/Family Therapy Goals Statement (PT) to go home  -     Rehab Potential (PT) good, to achieve stated therapy goals  [facility]     Criteria for Skilled Interventions Met (PT) yes;meets criteria;skilled treatment is necessary  -     Therapy Frequency (PT) daily  -       Row Name 10/12/23 1351          Vital Signs    Pre Systolic BP Rehab 132  -     Pre Treatment Diastolic BP 76  -     Pretreatment Heart Rate (beats/min) 110  -     Posttreatment Heart Rate (beats/min) 132  -     Pre SpO2 (%) 93  -     O2 Delivery Pre Treatment room air  -     O2 Delivery Intra Treatment room air  -     Post SpO2 (%) 92  -     O2 Delivery Post Treatment room air  -     Pre Patient Position Supine  -     Intra Patient Position Standing  -     Post Patient Position Supine  -       Row Name 10/12/23 1351          Positioning and Restraints    Pre-Treatment Position in bed  -     Post Treatment Position bed  -     In Bed supine;call light within reach;encouraged to call for assist;exit alarm on;with family/caregiver;side rails up x2;notified [facility]  -               User Key  (r) = Recorded By, (t) = Taken By, (c) = Cosigned By      Initials Name Provider Type     Diana Reis, PT Physical Therapist                   Outcome Measures       Row Name 10/12/23 1356 10/12/23 0838       How much help from another person do you currently need...    Turning from your back to your side while in flat bed without using bedrails? 3  - 3  -MH    Moving from lying on back to sitting on the side of a flat bed without  bedrails? 3  - 3  -MH    Moving to and from a bed to a chair (including a wheelchair)? 3  - 3  -MH    Standing up from a chair using your arms (e.g., wheelchair, bedside chair)? 3  - 3  -MH    Climbing 3-5 steps with a railing? 3  - 3  -MH    To walk in hospital room? 3  - 3  -MH    AM-PAC 6 Clicks Score (PT) 18  - 18  -    Highest level of mobility 6 --> Walked 10 steps or more  - 6 --> Walked 10 steps or more  -      Row Name 10/12/23 1356          Functional Assessment    Outcome Measure Options AM-PAC 6 Clicks Basic Mobility (PT)  -               User Key  (r) = Recorded By, (t) = Taken By, (c) = Cosigned By      Initials Name Provider Type     Luz Harkins, RN Registered Nurse     Diana Reis, PT Physical Therapist                                 Physical Therapy Education       Title: PT OT SLP Therapies (In Progress)       Topic: Physical Therapy (In Progress)       Point: Mobility training (Done)       Learning Progress Summary             Patient Acceptance, E, VU,NR by  at 10/12/2023 1357   Family Acceptance, E, VU,NR by  at 10/12/2023 1357                         Point: Home exercise program (Not Started)       Learner Progress:  Not documented in this visit.              Point: Body mechanics (Done)       Learning Progress Summary             Patient Acceptance, E, VU,NR by  at 10/12/2023 1357   Family Acceptance, E, VU,NR by  at 10/12/2023 1357                         Point: Precautions (Done)       Learning Progress Summary             Patient Acceptance, E, VU,NR by  at 10/12/2023 1357   Family Acceptance, E, VU,NR by  at 10/12/2023 1357                                         User Key       Initials Effective Dates Name Provider Type Discipline     09/21/23 -  Diana Reis, PT Physical Therapist PT                  PT Recommendation and Plan  Planned Therapy Interventions (PT): balance training, bed mobility training, gait training, home exercise  program, neuromuscular re-education, patient/family education, postural re-education, ROM (range of motion), strengthening, stretching, transfer training  Plan of Care Reviewed With: patient, son, family  Outcome Evaluation: PT evaluation completed. Pt presents below baseline function d/t deficits in balance, strength, and activity tolerance. Pt ambulated 180 ft w/ CGA and using a FWW. No LOB noted w/ FWW but pt was mildly unsteady ambulating w/o. Pt would benefit from skilled IP PT. Recommend home w/ assist and HHPT at d/c.     Time Calculation:   PT Evaluation Complexity  History, PT Evaluation Complexity: 3 or more personal factors and/or comorbidities  Examination of Body Systems (PT Eval Complexity): total of 3 or more elements  Clinical Presentation (PT Evaluation Complexity): stable  Clinical Decision Making (PT Evaluation Complexity): low complexity  Overall Complexity (PT Evaluation Complexity): low complexity     PT Charges       Row Name 10/12/23 1357             Time Calculation    Start Time 1314  -LH      PT Received On 10/12/23  -      PT Goal Re-Cert Due Date 10/22/23  -         Untimed Charges    PT Eval/Re-eval Minutes 49  -LH         Total Minutes    Untimed Charges Total Minutes 49  -LH       Total Minutes 49  -LH                User Key  (r) = Recorded By, (t) = Taken By, (c) = Cosigned By      Initials Name Provider Type     Diana Reis, KRISH Physical Therapist                  Therapy Charges for Today       Code Description Service Date Service Provider Modifiers Qty    [id number]  PT EVAL LOW COMPLEXITY 4 10/12/2023 Diana Reis, PT GP 1            PT G-Codes  Outcome Measure Options: AM-PAC 6 Clicks Basic Mobility (PT)  AM-PAC 6 Clicks Score (PT): 18  PT Discharge Summary  Anticipated Discharge Disposition (PT): home with assist, home with home health    Diana Reis PT  10/12/2023

## 2023-10-12 NOTE — PLAN OF CARE
Problem: Adult Inpatient Plan of Care  Goal: Plan of Care Review  Outcome: Ongoing, Progressing  Goal: Patient-Specific Goal (Individualized)  Outcome: Ongoing, Progressing  Goal: Absence of Hospital-Acquired Illness or Injury  Outcome: Ongoing, Progressing  Intervention: Identify and Manage Fall Risk  Recent Flowsheet Documentation  Taken 10/12/2023 0200 by Saundra Zaidi RN  Safety Promotion/Fall Prevention:   activity supervised   assistive device/personal items within reach   clutter free environment maintained   room organization consistent   safety round/check completed   toileting scheduled  Taken 10/12/2023 0000 by Saundra Zaidi RN  Safety Promotion/Fall Prevention:   activity supervised   assistive device/personal items within reach   clutter free environment maintained   room organization consistent   toileting scheduled   safety round/check completed  Taken 10/11/2023 2200 by Saundra Zaidi RN  Safety Promotion/Fall Prevention:   activity supervised   assistive device/personal items within reach   clutter free environment maintained   room organization consistent   toileting scheduled   safety round/check completed  Taken 10/11/2023 2000 by Saundra Zaidi RN  Safety Promotion/Fall Prevention:   activity supervised   clutter free environment maintained   assistive device/personal items within reach   room organization consistent   safety round/check completed   toileting scheduled  Intervention: Prevent Skin Injury  Recent Flowsheet Documentation  Taken 10/12/2023 0200 by Saundra Zaidi RN  Body Position: position changed independently  Skin Protection:   incontinence pads utilized   transparent dressing maintained  Taken 10/12/2023 0000 by Saundra Zaidi RN  Body Position: position changed independently  Skin Protection:   incontinence pads utilized   transparent dressing maintained  Taken 10/11/2023 2200 by Saundra Zaidi RN  Body Position: position changed independently  Taken  10/11/2023 2000 by Saundra Zaidi RN  Body Position: position changed independently  Skin Protection:   incontinence pads utilized   transparent dressing maintained  Intervention: Prevent and Manage VTE (Venous Thromboembolism) Risk  Recent Flowsheet Documentation  Taken 10/12/2023 0200 by Saundra Zaidi RN  Activity Management: activity encouraged  Taken 10/12/2023 0000 by Saundra Zaidi RN  Activity Management: activity encouraged  Taken 10/11/2023 2200 by Saundra Zaidi RN  Activity Management: activity encouraged  Taken 10/11/2023 2000 by Saundra Zaidi RN  Activity Management: activity encouraged  VTE Prevention/Management: (hep) other (see comments)  Range of Motion: active ROM (range of motion) encouraged  Intervention: Prevent Infection  Recent Flowsheet Documentation  Taken 10/11/2023 2000 by Saundra Zaidi RN  Infection Prevention: environmental surveillance performed  Goal: Optimal Comfort and Wellbeing  Outcome: Ongoing, Progressing  Intervention: Monitor Pain and Promote Comfort  Recent Flowsheet Documentation  Taken 10/11/2023 2000 by Saundra Zaiid RN  Pain Management Interventions:   care clustered   see MAR  Intervention: Provide Person-Centered Care  Recent Flowsheet Documentation  Taken 10/11/2023 2000 by Saundra Zaidi RN  Trust Relationship/Rapport: care explained  Goal: Readiness for Transition of Care  Outcome: Ongoing, Progressing     Problem: Skin Injury Risk Increased  Goal: Skin Health and Integrity  Outcome: Ongoing, Progressing  Intervention: Optimize Skin Protection  Recent Flowsheet Documentation  Taken 10/12/2023 0200 by Saundra Zaidi RN  Pressure Reduction Devices:   chair cushion utilized   positioning supports utilized  Skin Protection:   incontinence pads utilized   transparent dressing maintained  Taken 10/12/2023 0000 by Saundra Zaidi RN  Skin Protection:   incontinence pads utilized   transparent dressing maintained  Taken 10/11/2023 2000 by Dyan  Saundra, RN  Pressure Reduction Techniques:   frequent weight shift encouraged   weight shift assistance provided  Pressure Reduction Devices:   chair cushion utilized   positioning supports utilized  Skin Protection:   incontinence pads utilized   transparent dressing maintained     Problem: Pain Acute  Goal: Acceptable Pain Control and Functional Ability  Outcome: Ongoing, Progressing  Intervention: Develop Pain Management Plan  Recent Flowsheet Documentation  Taken 10/11/2023 2000 by Saundra Zaidi, RN  Pain Management Interventions:   care clustered   see MAR

## 2023-10-12 NOTE — CONSULTS
Clinical Nutrition   Nutrition Support Assessment  Reason for Visit: Identified at risk by screening criteria, MST score 2+, Reduced oral intake      Patient Name: Iqra Britt  YOB: 1950  MRN: [id number]  Date of Encounter: 10/12/23 15:07 EDT  Admission date: 10/11/2023    Comments:    -Ordered standing scale weight.  -Will complete NFPE as feasible.  -Low threshold for PN if consistent with GOC/POC - minimal PO intake x 3 weeks.      Nutrition Assessment   Admission Diagnosis:  Ovarian mass [N83.8]      Problem List:    Ovarian mass    COPD (chronic obstructive pulmonary disease)    Acute HFrEF (heart failure with reduced ejection fraction)    CKD (chronic kidney disease), stage III    Type 2 diabetes mellitus      PMH:   She  has a past medical history of Arthritis, CHF (congestive heart failure), COPD (chronic obstructive pulmonary disease), Diabetes mellitus, Hyperlipidemia, and Renal disorder.    PSH:  She  has a past surgical history that includes Cholecystectomy and Dilation and curettage of uterus.    Applicable Nutrition Concerns:      Applicable Interval History:   (10/11) NGT to LWS initiated (d/t SBO caused by ovarian mass)  (10/12) s/p paracentesis - 700 ml removed  ---SLP to evaluate when appropriate      Reported/Observed/Food/Nutrition Related History:   Per EMR patient developed vaginal bleeding (5/2023) s/p EMB (6/2023) negative for cancer --> on/off symptoms s/p D&C (10/9/23) which showed abnormal fluid in uterus (pathology pending).    Patient and two family members present during visit. Patient c/o N/V/abdominal distension and pain x 3 weeks with minimal PO intake (<50% of usual) during that timeframe. Last solid food intake held down was 3 weeks ago. Reports -138 lbs. Weighed 133 lbs last week. Bed scale weight (one large blanket removed) reads 128 lbs. NKFA. Denies difficulty chewing/swallowing. Hungry and eager to eat/drink. No current plan  "for surgery or inpatient chemotherapy per Gynecology MD note today.      Anthropometrics     Flowsheet Rows      Flowsheet Row First Filed Value   Admission Height 154.9 cm (61\") Documented at 10/11/2023 1322   Admission Weight 60.3 kg (133 lb) Documented at 10/11/2023 1322       Height: Height: 154.9 cm (61\")  Last Filed Weight: Weight: 60.3 kg (133 lb) (10/11/23 1500)  Method: Weight Method: Stated  BMI: BMI (Calculated): 25.1  BMI classification: Overweight: 25.0-29.9kg/m2   IBW:  105 lbs    UBW: 135-138 lbs per patient  Per EMR wt hx (office visits)  132 lbs (10/9/23)  141 lbs (9/19/23)  137 lbs (6/7/23)  141 lbs (3/14/23)    Weight change: Possible unintentional weight loss of 13 lbs x 3 weeks. May be more than this as bed scale weight consisted of a couple of sheets and a pillow (RD removed large blanket). Need standing scale weight.    Nutrition Focused Physical Exam     Date:  10/12  Unable to perform exam due to: Pt unable to participate at time of visit    Current Nutrition Prescription   PO: NPO Diet NPO Type: Strict NPO  Oral Nutrition Supplement: N/A  Intake: N/A      Nutrition Diagnosis     Date:              Updated:    Problem Inadequate oral intake   Etiology N/V/abdominal pain and distension in setting of SBO   Signs/Symptoms <50% of usual PO intake x 3 weeks   Status:     Goal:   General: Nutrition to support treatment  PO: Advace diet as medically feasible/appropriate  EN/PN: N/A    Nutrition Intervention      Follow treatment progress, Care plan reviewed, Await begin PO    -Ordered standing scale weight.  -Will complete NFPE as feasible.  -Low threshold for PN if consistent with GOC/POC.      Monitoring/Evaluation:   Per protocol, I&O, Pertinent labs, Weight, GI status, Symptoms, POC/GOC, Swallow function      Halina Garcia RD  Time Spent: 45 min  "

## 2023-10-12 NOTE — PROCEDURES
The following procedure was performed: USG para.  Small pocket in the RUQ with very distended loops of fluid filled bowel in the backdrop tapped carefully with real time USG after d/w the Rx team.     Please see corresponding Radiology report for in detail procedural information. The Radiology report will be dictated shortly, if not done so already. Please see the IR RN note for the information regarding medicines administered if any, chucho-procedural vitals and I/O information.

## 2023-10-12 NOTE — SIGNIFICANT NOTE
10/12/23 0911   SLP Deferred Reason   SLP Deferred Reason Routine  (Consult received for dysphagia eval & chart reviewed. Spoke w/ RN- pt currently NPO w/ NGT for decompression. SLP will f/u for eval when pt cleared for PO intake.)

## 2023-10-12 NOTE — PLAN OF CARE
Goal Outcome Evaluation:  Plan of Care Reviewed With: patient, son, family           Outcome Evaluation: PT evaluation completed. Pt presents below baseline function d/t deficits in balance, strength, and activity tolerance. Pt ambulated 180 ft w/ CGA and using a FWW. No LOB noted w/ FWW but pt was mildly unsteady ambulating w/o. Pt would benefit from skilled IP PT. Recommend home w/ assist and HHPT at d/c.      Anticipated Discharge Disposition (PT): home with assist, home with home health

## 2023-10-12 NOTE — NURSING NOTE
Ultrasound guided paracentesis performed by Dr Aguilar. Patient tolerated procedure well. 700ml  dark lynn fluid drained from right lateral abdomen, dressing applied. VSS. Report given to RN. Fluids sent to lab per US tech.

## 2023-10-12 NOTE — CASE MANAGEMENT/SOCIAL WORK
Discharge Planning Assessment  [facility]     Patient Name: Iqra Britt  MRN: [id number]  Today's Date: 10/12/2023    Admit Date: 10/11/2023    Plan: IDP   Discharge Needs Assessment       Row Name 10/12/23 1125       Living Environment    People in Home child(rachel), adult    Name(s) of People in Home Chidi Britt (Son)  [phone] (Mobile)    Current Living Arrangements home    Potentially Unsafe Housing Conditions none    In the past 12 months has the electric, gas, oil, or water company threatened to shut off services in your home? No    Primary Care Provided by self    Provides Primary Care For no one    Family Caregiver if Needed child(rachel), adult    Family Caregiver Names Chidi Britt (Son)  [phone] (Mobile)   Will Britt Son [phone]    Quality of Family Relationships helpful;involved    Able to Return to Prior Arrangements yes       Resource/Environmental Concerns    Resource/Environmental Concerns none    Transportation Concerns none       Food Insecurity    Within the past 12 months, you worried that your food would run out before you got the money to buy more. Never true    Within the past 12 months, the food you bought just didn't last and you didn't have money to get more. Never true       Transition Planning    Patient/Family Anticipates Transition to home with family    Patient/Family Anticipated Services at Transition none    Transportation Anticipated family or friend will provide       Discharge Needs Assessment    Readmission Within the Last 30 Days no previous admission in last 30 days    Equipment Currently Used at Home cpap;walker, rolling    Concerns to be Addressed denies needs/concerns at this time    Anticipated Changes Related to Illness inability to care for self    Equipment Needed After Discharge none    Current Discharge Risk chronically ill                   Discharge Plan       Row Name 10/12/23 1127       Plan    Plan IDP    Patient/Family in  Agreement with Plan yes    Plan Comments CM met with the patient at the bedside. She lives in a house in [facility] with her son. She described herself as independent and able to drive but does not drive often. She has a CPAP, nebulizer, and rollator at home. She denied any safety concerns in her home. She confirmed her insurance is Medicare A&B and her PCP is Georgie Fernández DO. She plans to return home at discharge with one of two sons to transport her. She has denied any discharge needs at this time. CM to follow and assist as needed.    Final Discharge Disposition Code 01 - home or self-care                  Continued Care and Services - Admitted Since 10/11/2023    Coordination has not been started for this encounter.          Demographic Summary    No documentation.                  Functional Status       Row Name 10/12/23 1124       Functional Status    Usual Activity Tolerance moderate    Current Activity Tolerance fair       Physical Activity    On average, how many days per week do you engage in moderate to strenuous exercise (like a brisk walk)? 0 days    On average, how many minutes do you engage in exercise at this level? 0 min    Number of minutes of exercise per week 0       Assessment of Health Literacy    How often do you have someone help you read hospital materials? Occasionally    How often do you have problems learning about your medical condition because of difficulty understanding written information? Occasionally    How often do you have a problem understanding what is told to you about your medical condition? Occasionally    How confident are you filling out medical forms by yourself? Quite a bit    Health Literacy Good       Functional Status, IADL    Medications independent    Meal Preparation independent    Housekeeping independent    Laundry independent    Shopping assistive person       Mental Status    General Appearance WDL WDL       Mental Status Summary    Recent Changes in  Mental Status/Cognitive Functioning no changes       Employment/    Employment Status retired                   Psychosocial    No documentation.                  Abuse/Neglect    No documentation.                  Legal    No documentation.                  Substance Abuse    No documentation.                  Patient Forms    No documentation.                     Nancy Chen RN

## 2023-10-12 NOTE — PROGRESS NOTES
"    [facility] Medicine Services  PROGRESS NOTE    Patient Name: Iqra Britt  : 1950  MRN: [id number]    Date of Admission: 10/11/2023  Primary Care Physician: Georgie Fernández,     Subjective   Subjective     CC:  F/U abdominal distension    HPI:  Seen this afternoon. She had diagnostic paracentesis earlier today. Complains of feeling \"full\" but no nausea currently. Complains of being hungry and wants something to eat.       Objective   Objective     Vital Signs:   Temp:  [97 °F (36.1 °C)-98.7 °F (37.1 °C)] 97 °F (36.1 °C)  Heart Rate:  [103-126] 126  Resp:  [16-20] 16  BP: (108-148)/(65-91) 132/76     Physical Exam:  Gen-no acute distress  HENT-NCAT, mucous membranes moist  CV-RRR, S1 S2 normal, no m/r/g  Resp-CTAB, no wheezes or rales  Abd-significantly distended, NT, hypoactive BS  Ext-no edema  Neuro-A&Ox3, no focal deficits  Skin-no rashes  Psych-appropriate mood      Results Reviewed:  LAB RESULTS:      Lab 10/12/23  0416 10/11/23  1340   WBC 9.53 14.40*   HEMOGLOBIN 10.8* 12.0   HEMATOCRIT 34.2 38.9   PLATELETS 450 585*   NEUTROS ABS 8.67* 13.15*   IMMATURE GRANS (ABS)  --  0.13*   LYMPHS ABS  --  0.25*   MONOS ABS  --  0.84   EOS ABS 0.00 0.00   MCV 81.6 82.4   PROTIME  --  16.9*         Lab 10/12/23  0416 10/11/23  1354 10/11/23  1347 10/11/23  1340   SODIUM 130*  --   --  133*   POTASSIUM 4.0  --   --  4.5   CHLORIDE 95*  --   --  91*   CO2 23.0  --   --  22.0   ANION GAP 12.0  --   --  20.0*   BUN 58*  --   --  58*   CREATININE 1.52* 2.10 2.10* 1.72*   EGFR 36.3*  --   --  31.3*   GLUCOSE 285*  --   --  358*   CALCIUM 7.9*  --   --  8.5*         Lab 10/12/23  0416 10/11/23  1340   TOTAL PROTEIN 6.1 7.2   ALBUMIN 2.5* 3.3*   GLOBULIN 3.6 3.9   ALT (SGPT) 10 13   AST (SGOT) 30 31   BILIRUBIN 0.4 0.4   ALK PHOS 46 56   LIPASE  --  7*         Lab 10/11/23  1340   PROBNP 1,889.0*   HSTROP T 28*   PROTIME 16.9*   INR 1.36*                 Brief Urine Lab Results  " (Last result in the past 365 days)        Color   Clarity   Blood   Leuk Est   Nitrite   Protein   CREAT   Urine HCG        10/12/23 0425 Yellow   Cloudy   Moderate (2+)   Small (1+)   Negative   30 mg/dL (1+)                   Microbiology Results Abnormal       None            XR Abdomen KUB    Result Date: 10/11/2023  XR ABDOMEN KUB Date of Exam: 10/11/2023 5:58 PM CDT Indication: ng tube placement Comparison: None available. Findings: There is nasogastric tube with tip in the body of the stomach. There are dilated loops of small intestine.     Impression: Impression: There is nasogastric tube with tip in the body of the stomach Electronically Signed: Tres Bauer MD  10/11/2023 6:26 PM CDT  Workstation ID: [id number]    CT Abdomen Pelvis Without Contrast    Result Date: 10/11/2023  CT ABDOMEN PELVIS WO CONTRAST Date of Exam: 10/11/2023 2:43 PM CDT Indication: abd pain, distension, recent dx of uterine mass, Cr 2.1. Comparison: None available. Technique: Axial CT images were obtained of the abdomen and pelvis without the administration of contrast. Reconstructed coronal and sagittal images were also obtained. Automated exposure control and iterative construction methods were used. Findings: LUNG BASES:  Unremarkable without mass or infiltrate. LIVER:  Unremarkable parenchyma without focal lesion. BILIARY/GALLBLADDER: Cholecystectomy SPLEEN:  Unremarkable PANCREAS:  Unremarkable ADRENAL:  Unremarkable KIDNEYS:  Unremarkable parenchyma with no solid mass identified. No obstruction.  No calculus identified. GASTROINTESTINAL/MESENTERY: Dilated small intestine measuring up to 4 cm in diameter with somewhat gradual tapering in the right central lower abdomen in the region of abdominopelvic mass. There is likely an element of mass effect/partial obstruction related to the mass. Distal small intestine is decompressed. There is a moderate proximal and mid fecal load. AORTA/IVC:  Normal caliber. RETROPERITONEUM/LYMPH NODES:  There are enlarged retroperitoneal lymph nodes including: *2.2 x 2.0 cm left para-aortic mid abdominal node (image 64, series 2) *1.5 x 1.4 cm left para-aortic lower abdominal node (image 70, series 2) REPRODUCTIVE: There is a heterogeneous 14.9 x 11.7 cm mass within the central upper pelvis/lower abdomen. While this is adjacent to the uterine fundus it does not appear to be in direct communication and is most suggestive of an ovarian neoplasm likely arising from the right ovary. There is a heterogeneously enlarged left ovary measuring 5.0 x 4.6 cm. BLADDER:  Unremarkable OSSEUS STRUCTURES:  Typical for age with no acute process identified. There is small to moderate volume ascites. There are areas of mesenteric spider webbing/infiltration with some suggestion of nodularity worrisome for carcinomatosis. However, this is difficult to assess given lack of intravenous contrast.     Impression: Impression: 1.Large central abdominopelvic mass, likely arising from the right ovary. 2.Dilated small intestine with somewhat gradual transition in the right anterior lower abdomen near the after mentioned mass suggestive of at least partial mass effect/obstruction. 3.Small to moderate volume ascites with imaging features concerning for carcinomatosis. 4.Enlarged retroperitoneal lymph nodes, likely metastatic. Electronically Signed: Tres Bauer MD  10/11/2023 3:09 PM CDT  Workstation ID: [id number]    XR Chest 1 View    Result Date: 10/11/2023  XR CHEST 1 VW Date of Exam: 10/11/2023 2:10 PM EDT Indication: abd pain, distension, recent dx of uterine cancer, ascites Comparison: None available. Findings: A left subclavian ICD is in place. The lungs are clear. The heart and mediastinal contours appear normal. There is no pleural effusion. The pulmonary vasculature appears normal. The osseous structures appear intact.     Impression: Impression: No acute cardiopulmonary process. Electronically Signed: Albert Rangel MD  10/11/2023  2:46 PM EDT  Workstation ID: [id number]         Current medications:  Scheduled Meds:cholestyramine light, 1 packet, Oral, Daily  donepezil, 5 mg, Oral, Nightly  [START ON 10/13/2023] famotidine, 20 mg, Oral, Daily  heparin (porcine), 5,000 Units, Subcutaneous, Q8H  insulin regular, 2-9 Units, Subcutaneous, Q6H  sodium chloride, 10 mL, Intravenous, Q12H      Continuous Infusions:sodium chloride, 50 mL/hr, Last Rate: 50 mL/hr (10/11/23 2322)      PRN Meds:.  acetaminophen    dextrose    dextrose    dextrose    glucagon (human recombinant)    hydrALAZINE    ipratropium-albuterol    Morphine    ondansetron    [COMPLETED] Insert Peripheral IV **AND** sodium chloride    sodium chloride    sodium chloride    Assessment & Plan   Assessment & Plan     Active Hospital Problems    Diagnosis  POA    **Ovarian mass [N83.8]  Yes    COPD (chronic obstructive pulmonary disease) [J44.9]  Yes    Acute HFrEF (heart failure with reduced ejection fraction) [I50.21]  Yes    CKD (chronic kidney disease), stage III [N18.30]  Yes    Type 2 diabetes mellitus [E11.9]  Yes      Resolved Hospital Problems   No resolved problems to display.        Brief Hospital Course to date:  Iqra Britt is a 72 y.o. female with PMH significant for HFrEF/NICM s/p BiV ICD (LVEF 26%), HTN, CKDIII, Arthritis, DMII, and COPD who presents to [facility] with complaints of abdominal distention and pain along with nausea/vomiting.    This patient's problems and plans were partially entered by my partner and updated as appropriate by me 10/12/23. Copied text in this note has been reviewed and is accurate as of today's date.      SBO  --CT A/P showing small bowel with transition point in right lower abdomen near a large central abdominopelvic mass likely arising from the right ovary  --Dr. Draper to evaluate, recommended NG tube which has been placed  --NPO, gentle IVF for now (given hx of CHF)  --PRN pain control     Large pelvic mass with ascites, with concern  for carcinomatosis and retroperitoneal lymphadenopathy  --CA 19-9, CEA, CA-125 all significantly elevated per review of OSH records, repeat CA-125 on admission elevated at 776  --CT A/P showing small bowel with transition point in right lower abdomen near a large central abdominopelvic mass likely arising from the right ovary, small to moderate volume ascites with features concerning for carcinomatosis, retroperitoneal lymphadenopathy likely metastatic   --Concern for primary ovarian malignancy with malignant ascites  --Dr. Draper to evaluate, recommended IR paracentesis -- discussed with Dr. Felipe who did not see a significant amount of ascites but able to access a small pocket in the RUQ for diagnostic tap: fluid sent for cytology  --Recent negative endometrial biopsy 5/2023 and recent D&C 10/9/23 with pathology still pending  --Given her comorbidities, she is not a great surgical candidate     CKDIII  --Baseline Cr appears to be around 1.5  --Avoid nephrotoxins, holding Lasix while NPO     HFrEF/NICM s/p BiV ICD (LVEF 26% 9/2023)  --Followed by Cardiology at   --Does not appear to be on GDMT, will hold Lasix for now due to NPO/SBO, need to have pharmacy clarify med rec, looks like she may have recently been started on Entresto, Ibravadine?  --Monitor for volume overload, appears euvolemic currently     Abnormal UA  --Sample contaminated with significant squamous cells, repeat UA unremarkable for acute infection     COPD  --Not in exacerbation  --PRN Duo-nebs     DMII  --Check HbA1c  --SSI coverage while NPO  --Add Levemir 10 units nightly       Expected Discharge Location and Transportation: home  Expected Discharge   Expected Discharge Date: 10/16/2023; Expected Discharge Time:      DVT prophylaxis:  Medical DVT prophylaxis orders are present.     AM-PAC 6 Clicks Score (PT): 18 (10/12/23 1505)    CODE STATUS:   Code Status and Medical Interventions:   Ordered at: 10/11/23 1541     Level Of Support  Discussed With:    Patient     Code Status (Patient has no pulse and is not breathing):    CPR (Attempt to Resuscitate)     Medical Interventions (Patient has pulse or is breathing):    Full Support       Charito Amaro MD  10/12/23

## 2023-10-13 ENCOUNTER — APPOINTMENT (OUTPATIENT)
Dept: GENERAL RADIOLOGY | Facility: HOSPITAL | Age: 73
DRG: 737 | End: 2023-10-13
Payer: MEDICARE

## 2023-10-13 LAB
BACTERIA SPEC AEROBE CULT: ABNORMAL
GIE STN SPEC: NORMAL
GLUCOSE BLDC GLUCOMTR-MCNC: 102 MG/DL (ref 70–130)
GLUCOSE BLDC GLUCOMTR-MCNC: 105 MG/DL (ref 70–130)
GLUCOSE BLDC GLUCOMTR-MCNC: 158 MG/DL (ref 70–130)
GLUCOSE BLDC GLUCOMTR-MCNC: 63 MG/DL (ref 70–130)
GLUCOSE BLDC GLUCOMTR-MCNC: 76 MG/DL (ref 70–130)
GLUCOSE BLDC GLUCOMTR-MCNC: 84 MG/DL (ref 70–130)
INTERPRETATION: NORMAL

## 2023-10-13 PROCEDURE — 74018 RADEX ABDOMEN 1 VIEW: CPT

## 2023-10-13 PROCEDURE — 25810000003 SODIUM CHLORIDE 0.9 % SOLUTION: Performed by: INTERNAL MEDICINE

## 2023-10-13 PROCEDURE — 82948 REAGENT STRIP/BLOOD GLUCOSE: CPT

## 2023-10-13 PROCEDURE — 97165 OT EVAL LOW COMPLEX 30 MIN: CPT

## 2023-10-13 PROCEDURE — 97535 SELF CARE MNGMENT TRAINING: CPT

## 2023-10-13 PROCEDURE — 25010000002 HEPARIN (PORCINE) PER 1000 UNITS: Performed by: INTERNAL MEDICINE

## 2023-10-13 PROCEDURE — 99233 SBSQ HOSP IP/OBS HIGH 50: CPT | Performed by: OBSTETRICS & GYNECOLOGY

## 2023-10-13 PROCEDURE — 99222 1ST HOSP IP/OBS MODERATE 55: CPT | Performed by: INTERNAL MEDICINE

## 2023-10-13 RX ORDER — SUCRALFATE 1 G/1
1 TABLET ORAL 4 TIMES DAILY
COMMUNITY

## 2023-10-13 RX ORDER — SPIRONOLACTONE 25 MG/1
25 TABLET ORAL DAILY
Status: DISCONTINUED | OUTPATIENT
Start: 2023-10-13 | End: 2023-10-14

## 2023-10-13 RX ORDER — POTASSIUM CHLORIDE 750 MG/1
10 TABLET, FILM COATED, EXTENDED RELEASE ORAL DAILY
COMMUNITY

## 2023-10-13 RX ORDER — ONDANSETRON 4 MG/1
4 TABLET, FILM COATED ORAL EVERY 8 HOURS PRN
COMMUNITY

## 2023-10-13 RX ORDER — CHOLESTYRAMINE LIGHT 4 G/5.7G
1 POWDER, FOR SUSPENSION ORAL DAILY
Status: DISCONTINUED | OUTPATIENT
Start: 2023-10-14 | End: 2023-10-14

## 2023-10-13 RX ORDER — ERGOCALCIFEROL 1.25 MG/1
50000 CAPSULE ORAL
COMMUNITY

## 2023-10-13 RX ORDER — HYDROCODONE BITARTRATE AND ACETAMINOPHEN 5; 325 MG/1; MG/1
1 TABLET ORAL EVERY 6 HOURS PRN
COMMUNITY

## 2023-10-13 RX ORDER — FAMOTIDINE 10 MG/ML
20 INJECTION, SOLUTION INTRAVENOUS DAILY
Status: DISCONTINUED | OUTPATIENT
Start: 2023-10-14 | End: 2023-10-20 | Stop reason: HOSPADM

## 2023-10-13 RX ORDER — PROMETHAZINE HYDROCHLORIDE 12.5 MG/1
12.5 TABLET ORAL EVERY 6 HOURS PRN
COMMUNITY

## 2023-10-13 RX ORDER — FUROSEMIDE 40 MG/1
40 TABLET ORAL DAILY
Status: DISCONTINUED | OUTPATIENT
Start: 2023-10-13 | End: 2023-10-13

## 2023-10-13 RX ORDER — DONEPEZIL HYDROCHLORIDE 5 MG/1
5 TABLET, FILM COATED ORAL NIGHTLY
Status: DISCONTINUED | OUTPATIENT
Start: 2023-10-13 | End: 2023-10-18

## 2023-10-13 RX ORDER — LANOLIN ALCOHOL/MO/W.PET/CERES
1000 CREAM (GRAM) TOPICAL DAILY
COMMUNITY

## 2023-10-13 RX ORDER — VITAMIN B COMPLEX
1 CAPSULE ORAL DAILY
COMMUNITY

## 2023-10-13 RX ORDER — BACLOFEN 20 MG
500 TABLET ORAL 2 TIMES DAILY
COMMUNITY

## 2023-10-13 RX ORDER — CLONAZEPAM 0.5 MG/1
0.5 TABLET ORAL 2 TIMES DAILY
Status: DISCONTINUED | OUTPATIENT
Start: 2023-10-13 | End: 2023-10-14

## 2023-10-13 RX ADMIN — SPIRONOLACTONE 25 MG: 25 TABLET ORAL at 14:17

## 2023-10-13 RX ADMIN — DONEPEZIL HYDROCHLORIDE 5 MG: 5 TABLET, FILM COATED ORAL at 20:37

## 2023-10-13 RX ADMIN — SACUBITRIL AND VALSARTAN 1 TABLET: 24; 26 TABLET, FILM COATED ORAL at 20:37

## 2023-10-13 RX ADMIN — Medication 10 ML: at 20:37

## 2023-10-13 RX ADMIN — HEPARIN SODIUM 5000 UNITS: 5000 INJECTION INTRAVENOUS; SUBCUTANEOUS at 05:05

## 2023-10-13 RX ADMIN — DEXTROSE 15 G: 15 GEL ORAL at 06:45

## 2023-10-13 RX ADMIN — Medication 10 ML: at 08:35

## 2023-10-13 RX ADMIN — IVABRADINE 7.5 MG: 5 TABLET, FILM COATED ORAL at 17:37

## 2023-10-13 RX ADMIN — HEPARIN SODIUM 5000 UNITS: 5000 INJECTION INTRAVENOUS; SUBCUTANEOUS at 20:37

## 2023-10-13 RX ADMIN — SERTRALINE 75 MG: 50 TABLET, FILM COATED ORAL at 14:17

## 2023-10-13 RX ADMIN — HEPARIN SODIUM 5000 UNITS: 5000 INJECTION INTRAVENOUS; SUBCUTANEOUS at 14:17

## 2023-10-13 RX ADMIN — SODIUM CHLORIDE 50 ML/HR: 9 INJECTION, SOLUTION INTRAVENOUS at 17:47

## 2023-10-13 RX ADMIN — CLONAZEPAM 0.5 MG: 0.5 TABLET ORAL at 20:37

## 2023-10-13 RX ADMIN — SODIUM PHOSPHATE 1 ENEMA: 7; 19 ENEMA RECTAL at 08:34

## 2023-10-13 RX ADMIN — CHOLESTYRAMINE 4 G: 4 POWDER, FOR SUSPENSION ORAL at 08:34

## 2023-10-13 NOTE — PROGRESS NOTES
[facility] Medicine Services  PROGRESS NOTE    Patient Name: Iqra Britt  : 1950  MRN: [id number]    Date of Admission: 10/11/2023  Primary Care Physician: Georgie Fernández,     Subjective   Subjective     CC:  F/U abdominal distension    HPI:  Seen this morning. Had an enema yesterday with a small bowel movement. She says her abdomen feels less bloated today. Pain controlled. HR trending up since yesterday.      Objective   Objective     Vital Signs:   Temp:  [97.6 °F (36.4 °C)-98.6 °F (37 °C)] 98.2 °F (36.8 °C)  Heart Rate:  [119-133] 130  Resp:  [16-18] 18  BP: (104-130)/(64-79) 104/64     Physical Exam:  Gen-no acute distress  HENT-NCAT, mucous membranes moist  CV-tachycardic, S1 S2 normal, no m/r/g  Resp-CTAB, no wheezes or rales  Abd-less distended than yesterday, NT, hypoactive BS  Ext-no edema  Neuro-A&Ox3, no focal deficits  Skin-no rashes  Psych-appropriate mood      Results Reviewed:  LAB RESULTS:      Lab 10/12/23  0416 10/11/23  1340   WBC 9.53 14.40*   HEMOGLOBIN 10.8* 12.0   HEMATOCRIT 34.2 38.9   PLATELETS 450 585*   NEUTROS ABS 8.67* 13.15*   IMMATURE GRANS (ABS)  --  0.13*   LYMPHS ABS  --  0.25*   MONOS ABS  --  0.84   EOS ABS 0.00 0.00   MCV 81.6 82.4   PROTIME  --  16.9*         Lab 10/12/23  0416 10/11/23  1354 10/11/23  1347 10/11/23  1340   SODIUM 130*  --   --  133*   POTASSIUM 4.0  --   --  4.5   CHLORIDE 95*  --   --  91*   CO2 23.0  --   --  22.0   ANION GAP 12.0  --   --  20.0*   BUN 58*  --   --  58*   CREATININE 1.52* 2.10 2.10* 1.72*   EGFR 36.3*  --   --  31.3*   GLUCOSE 285*  --   --  358*   CALCIUM 7.9*  --   --  8.5*   HEMOGLOBIN A1C 8.80*  --   --   --          Lab 10/12/23  0416 10/11/23  1340   TOTAL PROTEIN 6.1 7.2   ALBUMIN 2.5* 3.3*   GLOBULIN 3.6 3.9   ALT (SGPT) 10 13   AST (SGOT) 30 31   BILIRUBIN 0.4 0.4   ALK PHOS 46 56   LIPASE  --  7*         Lab 10/11/23  1340   PROBNP 1,889.0*   HSTROP T 28*   PROTIME 16.9*   INR  1.36*                 Brief Urine Lab Results  (Last result in the past 365 days)        Color   Clarity   Blood   Leuk Est   Nitrite   Protein   CREAT   Urine HCG        10/12/23 0425 Yellow   Cloudy   Moderate (2+)   Small (1+)   Negative   30 mg/dL (1+)                   Microbiology Results Abnormal       Procedure Component Value - Date/Time    Fungus Smear - Peritoneal Fluid, Peritoneum [[id number]] Collected: 10/12/23 1139    Lab Status: Final result Specimen: Peritoneal Fluid from Peritoneum Updated: 10/13/23 1338     Fungal Stain No fungal elements seen    Body Fluid Culture - Body Fluid, Peritoneum [[id number]] Collected: 10/12/23 1139    Lab Status: Preliminary result Specimen: Body Fluid from Peritoneum Updated: 10/13/23 0913     Body Fluid Culture No growth     Gram Stain Moderate (3+) WBCs per low power field      No organisms seen            XR Abdomen KUB    Result Date: 10/13/2023  XR ABDOMEN KUB Date of Exam: 10/13/2023 4:32 AM EDT Indication: replacement of NG tube Comparison: 10/13/2023 at 0252 hours. Findings: Gastric suction tube is repositioned with the tip in the mid stomach. There are distended bowel loops in the upper abdomen. There is mild bibasilar atelectasis. No pneumoperitoneum.     Impression: Impression: Gastric suction tube tip is in the mid stomach. Electronically Signed: Faraz Roberts MD  10/13/2023 4:54 AM EDT  Workstation ID: [id number]    XR Abdomen KUB    Result Date: 10/13/2023  XR ABDOMEN KUB Date of Exam: 10/13/2023 3:09 AM EDT Indication: NG placement Comparison: 10/11/2023 and CT abdomen and pelvis same date. Findings: Gastric suction tube is folded in the distal esophagus. There are distended bowel loops in the abdomen measuring up to 41 mm consistent with ongoing obstruction. No evidence of pneumoperitoneum.     Impression: Impression: Gastric suction tube is folded in the distal esophagus. Electronically Signed: Faraz Roberts MD  10/13/2023 3:50 AM EDT  Workstation ID:  [id number]    US Paracentesis    Result Date: 10/12/2023  US PARACENTESIS  History: concern for ovarian malignancy, malignant ascites  : Eron Aguilar MD.  Modality: Ultrasonography                   Sedation: None. Anesthesia: Lidocaine 1% local infiltration. Estimated blood loss:  0 cc.        Technique: A thorough discussion of the risks, benefits, and alternatives of the procedure, and if applicable, moderate sedation, was carried out with the patient. They were encouraged to ask any questions. Any questions were answered. They verbalized understanding. A written informed consent was then signed.  A multi-component timeout was performed prior to starting the procedure using the departmental protocol. The procedure room personnel used personal protective equipment. The operators used sterile gloves and if indicated, sterile gowns. The surgical site was prepped with chlorhexidine gluconate  and draped in the maximal applicable sterile fashion. A preliminary ultrasonography was performed to assess the target and determine a safe access site. It showed a small pocket ascites in the right side of the abdomen and a backdrop severely distended fluid and fluid and air-filled loops of bowel in the vicinity. Pertinent ultrasound images were stored to the PACS for documentation. Discussion was carried out with the hospitalist. They indicated that the fluid was needed for diagnostic purposes. The patient understood the risks and agreed to proceed. The access site was sterilely prepped and draped. Local anesthesia was administered. A dermatotomy was performed if needed. A catheter over the needle system was advanced into the peritoneal cavity under real-time ultrasound guidance. Straw colored fluid  was aspirated and the plastic catheter advanced into the peritoneum. The catheter was then connected to a fluid recovery system. At the end of the procedure, the catheter was withdrawn and an aseptic dressing  applied. The patient tolerated the procedure  well. After uneventful recovery recovery, the patient was discharged from the department in stable condition. The total amount of fluid recovered is given below. Albumin was infused intravenously if ordered by the referring doctor. Complications: None immediate. Specimen: The specimen was labeled and sent to the lab in appropriate carriers & containers if such was requested by the ordering provider.     Impression: Impression:                                                              Successful ultrasound-guided paracentesis using a Right upper quadrant access with recovery of 0.7 liters of fluid as described above. Thank you for the opportunity to assist in the care of your patient. Electronically Signed: Eron Aguilar MD  10/12/2023 4:09 PM EDT  Workstation ID: [id number]    XR Abdomen KUB    Result Date: 10/11/2023  XR ABDOMEN KUB Date of Exam: 10/11/2023 5:58 PM CDT Indication: ng tube placement Comparison: None available. Findings: There is nasogastric tube with tip in the body of the stomach. There are dilated loops of small intestine.     Impression: Impression: There is nasogastric tube with tip in the body of the stomach Electronically Signed: Tres Baure MD  10/11/2023 6:26 PM CDT  Workstation ID: [id number]    CT Abdomen Pelvis Without Contrast    Result Date: 10/11/2023  CT ABDOMEN PELVIS WO CONTRAST Date of Exam: 10/11/2023 2:43 PM CDT Indication: abd pain, distension, recent dx of uterine mass, Cr 2.1. Comparison: None available. Technique: Axial CT images were obtained of the abdomen and pelvis without the administration of contrast. Reconstructed coronal and sagittal images were also obtained. Automated exposure control and iterative construction methods were used. Findings: LUNG BASES:  Unremarkable without mass or infiltrate. LIVER:  Unremarkable parenchyma without focal lesion. BILIARY/GALLBLADDER: Cholecystectomy SPLEEN:  Unremarkable PANCREAS:   Unremarkable ADRENAL:  Unremarkable KIDNEYS:  Unremarkable parenchyma with no solid mass identified. No obstruction.  No calculus identified. GASTROINTESTINAL/MESENTERY: Dilated small intestine measuring up to 4 cm in diameter with somewhat gradual tapering in the right central lower abdomen in the region of abdominopelvic mass. There is likely an element of mass effect/partial obstruction related to the mass. Distal small intestine is decompressed. There is a moderate proximal and mid fecal load. AORTA/IVC:  Normal caliber. RETROPERITONEUM/LYMPH NODES: There are enlarged retroperitoneal lymph nodes including: *2.2 x 2.0 cm left para-aortic mid abdominal node (image 64, series 2) *1.5 x 1.4 cm left para-aortic lower abdominal node (image 70, series 2) REPRODUCTIVE: There is a heterogeneous 14.9 x 11.7 cm mass within the central upper pelvis/lower abdomen. While this is adjacent to the uterine fundus it does not appear to be in direct communication and is most suggestive of an ovarian neoplasm likely arising from the right ovary. There is a heterogeneously enlarged left ovary measuring 5.0 x 4.6 cm. BLADDER:  Unremarkable OSSEUS STRUCTURES:  Typical for age with no acute process identified. There is small to moderate volume ascites. There are areas of mesenteric spider webbing/infiltration with some suggestion of nodularity worrisome for carcinomatosis. However, this is difficult to assess given lack of intravenous contrast.     Impression: Impression: 1.Large central abdominopelvic mass, likely arising from the right ovary. 2.Dilated small intestine with somewhat gradual transition in the right anterior lower abdomen near the after mentioned mass suggestive of at least partial mass effect/obstruction. 3.Small to moderate volume ascites with imaging features concerning for carcinomatosis. 4.Enlarged retroperitoneal lymph nodes, likely metastatic. Electronically Signed: Tres Bauer MD  10/11/2023 3:09 PM CDT   Workstation ID: [id number]    XR Chest 1 View    Result Date: 10/11/2023  XR CHEST 1 VW Date of Exam: 10/11/2023 2:10 PM EDT Indication: abd pain, distension, recent dx of uterine cancer, ascites Comparison: None available. Findings: A left subclavian ICD is in place. The lungs are clear. The heart and mediastinal contours appear normal. There is no pleural effusion. The pulmonary vasculature appears normal. The osseous structures appear intact.     Impression: Impression: No acute cardiopulmonary process. Electronically Signed: Albert Rangel MD  10/11/2023 2:46 PM EDT  Workstation ID: [id number]         Current medications:  Scheduled Meds:[START ON 10/14/2023] cholestyramine light, 1 packet, Nasogastric, Daily  clonazePAM, 0.5 mg, Oral, BID  donepezil, 5 mg, Nasogastric, Nightly  [START ON 10/14/2023] famotidine, 20 mg, Intravenous, Daily  Fleets, 1 enema, Rectal, Daily  furosemide, 40 mg, Oral, Daily  heparin (porcine), 5,000 Units, Subcutaneous, Q8H  insulin detemir, 10 Units, Subcutaneous, Nightly  insulin regular, 2-9 Units, Subcutaneous, Q6H  ivabradine HCl, 7.5 mg, Oral, BID With Meals  pharmacy consult - [place], , Does not apply, Daily  sacubitril-valsartan, 1 tablet, Oral, BID  sertraline, 75 mg, Oral, Daily  sodium chloride, 10 mL, Intravenous, Q12H  spironolactone, 25 mg, Oral, Daily      Continuous Infusions:Pharmacy Consult,   sodium chloride, 50 mL/hr, Last Rate: 50 mL/hr (10/12/23 2157)      PRN Meds:.  acetaminophen    dextrose    dextrose    dextrose    glucagon (human recombinant)    hydrALAZINE    ipratropium-albuterol    LORazepam    Morphine    ondansetron    Pharmacy Consult    phenol    [COMPLETED] Insert Peripheral IV **AND** sodium chloride    sodium chloride    sodium chloride    Assessment & Plan   Assessment & Plan     Active Hospital Problems    Diagnosis  POA    **Ovarian mass [N83.8]  Yes    COPD (chronic obstructive pulmonary disease) [J44.9]  Yes    Acute HFrEF (heart failure with  reduced ejection fraction) [I50.21]  Yes    CKD (chronic kidney disease), stage III [N18.30]  Yes    Type 2 diabetes mellitus [E11.9]  Yes      Resolved Hospital Problems   No resolved problems to display.        Brief Hospital Course to date:  Iqra Britt is a 72 y.o. female with PMH significant for HFrEF/NICM s/p BiV ICD (LVEF 26%), HTN, CKDIII, Arthritis, DMII, and COPD who presents to [facility] with complaints of abdominal distention and pain along with nausea/vomiting.    This patient's problems and plans were partially entered by my partner and updated as appropriate by me 10/13/23. Copied text in this note has been reviewed and is accurate as of today's date.      SBO  --CT A/P showing small bowel with transition point in right lower abdomen near a large central abdominopelvic mass likely arising from the right ovary  --Dr. Draper following, continue NG tube; okay to give PO meds and clamp NG afterward per Dr. Draper  --NPO, gentle IVF for now (given hx of CHF)  --PRN pain control     Large pelvic mass with ascites, with concern for carcinomatosis and retroperitoneal lymphadenopathy  --CA 19-9, CEA, CA-125 all significantly elevated per review of OSH records, repeat CA-125 on admission elevated at 776  --CT A/P showing small bowel with transition point in right lower abdomen near a large central abdominopelvic mass likely arising from the right ovary, small to moderate volume ascites with features concerning for carcinomatosis, retroperitoneal lymphadenopathy likely metastatic   --Concern for primary ovarian malignancy with malignant ascites  --Dr. Draper following, continuing to discuss options with family  --IR did not see a significant amount of ascites, were able to remove 0.7 L from a small pocket of fluid in her RUQ on 10/12/23 and sent for studies including cytology  --Recent negative endometrial biopsy 5/2023 and recent D&C 10/9/23 with pathology still pending  --Given her comorbidities, she  is not a great surgical candidate, family requesting Cardiology evaluation to determine risk     CKDIII  --Baseline Cr appears to be around 1.5  --Avoid nephrotoxins, holding Lasix while NPO     HFrEF/NICM s/p BiV ICD (LVEF 26% 9/2023)  --Followed by Cardiology at   --Will hold Lasix for now due to NPO/SBO  --Resume Entresto, Ivabradine, Spironolactone today (meds have been reconciled by pharmacy)  --Monitor for volume overload  --Cardiology consult today     Abnormal UA  --Sample contaminated with significant squamous cells, repeat UA unremarkable for acute infection     COPD  --Not in exacerbation  --PRN Duo-nebs     DM2, uncontrolled  --HbA1c 8.8%  --SSI coverage while NPO  --Decrease Levemir to 5 units daily today as glucose running on lower side now       Expected Discharge Location and Transportation: home  Expected Discharge   Expected Discharge Date: 10/16/2023; Expected Discharge Time:      DVT prophylaxis:  Medical DVT prophylaxis orders are present.     AM-PAC 6 Clicks Score (PT): 18 (10/13/23 0800)    CODE STATUS:   Code Status and Medical Interventions:   Ordered at: 10/11/23 8257     Level Of Support Discussed With:    Patient     Code Status (Patient has no pulse and is not breathing):    CPR (Attempt to Resuscitate)     Medical Interventions (Patient has pulse or is breathing):    Full Support       Charito Amaro MD  10/13/23

## 2023-10-13 NOTE — SIGNIFICANT NOTE
10/13/23 1108   SLP Deferred Reason   SLP Deferred Reason Routine  (Spoke w/ RN- pt remains NPO w/ NGT for decompression. SLP will defer & f/u for eval when pt cleared for PO intake.)

## 2023-10-13 NOTE — PLAN OF CARE
Goal Outcome Evaluation:  Plan of Care Reviewed With: patient, son           Outcome Evaluation: OT eval complete. Pt presents with decreased act valeri, discomfort to abdomen, and overall weakness.  She completed bed mob with setup, cga for sts and taking a few steps at bedside, setup for grooming and  donning socks. Recommend IPOT and HHOT at d/c.      Anticipated Discharge Disposition (OT): home with home health, home with assist

## 2023-10-13 NOTE — THERAPY EVALUATION
Patient Name: Iqra Britt  : 1950    MRN: [id number]                              Today's Date: 10/13/2023       Admit Date: 10/11/2023    Visit Dx:     ICD-10-CM ICD-9-CM   1. Pelvic mass  R19.00 789.30   2. Partial intestinal obstruction, unspecified cause  K56.600 560.9   3. Nausea and vomiting, unspecified vomiting type  R11.2 787.01   4. Other ascites  R18.8 789.59   5. Hyperglycemia  R73.9 790.29   6. Renal insufficiency  N28.9 593.9     Patient Active Problem List   Diagnosis    Ovarian mass    COPD (chronic obstructive pulmonary disease)    Acute HFrEF (heart failure with reduced ejection fraction)    CKD (chronic kidney disease), stage III    Type 2 diabetes mellitus     Past Medical History:   Diagnosis Date    Arthritis     CHF (congestive heart failure)     COPD (chronic obstructive pulmonary disease)     Diabetes mellitus     Hyperlipidemia     Renal disorder      Past Surgical History:   Procedure Laterality Date    CHOLECYSTECTOMY      DILATATION AND CURETTAGE        General Information       Row Name 10/13/23 0910          OT Time and Intention    Document Type evaluation  -SW     Mode of Treatment occupational therapy  -       Row Name 10/13/23 0910          General Information    Patient Profile Reviewed yes  -SW     Prior Level of Function independent:;all household mobility;bed mobility;driving;home management  -SW     Existing Precautions/Restrictions fall;NPO;other (see comments)  NG tube  -SW     Barriers to Rehab medically complex  -SW       Row Name 10/13/23 0910          Occupational Profile    Environmental Supports and Barriers (Occupational Profile) Supportive family, tub shower with shower seat, rollator.  -SW       Row Name 10/13/23 0910          Living Environment    People in Home child(rachel), adult  -SW       Row Name 10/13/23 0910          Home Main Entrance    Number of Stairs, Main Entrance none  ramp  -SW       Row Name 10/13/23 0910          Stairs Within  Home, Primary    Number of Stairs, Within Home, Primary none  -       Row Name 10/13/23 0910          Cognition    Orientation Status (Cognition) oriented x 3  Pt knew she was at hospital but required cues for the name  -       Row Name 10/13/23 0910          Safety Issues, Functional Mobility    Safety Issues Affecting Function (Mobility) awareness of need for assistance;insight into deficits/self-awareness;safety precaution awareness;safety precautions follow-through/compliance;sequencing abilities  -     Impairments Affecting Function (Mobility) balance;endurance/activity tolerance;shortness of breath;strength;pain  -               User Key  (r) = Recorded By, (t) = Taken By, (c) = Cosigned By      Initials Name Provider Type    Alisa Cedillo OT Occupational Therapist                     Mobility/ADL's       Row Name 10/13/23 0910          Bed Mobility    Bed Mobility supine-sit;sit-supine  -SW     Supine-Sit [place] (Bed Mobility) set up  -SW     Sit-Supine [place] (Bed Mobility) set up  -SW     Assistive Device (Bed Mobility) bed rails;head of bed elevated  -       Row Name 10/13/23 0910          Transfers    Transfers sit-stand transfer  -       Row Name 10/13/23 0910          Sit-Stand Transfer    Sit-Stand [place] (Transfers) contact guard  -     Comment, (Sit-Stand Transfer) No AE  -       Row Name 10/13/23 0910          Functional Mobility    Functional Mobility- Ind. Level contact guard assist  -SW     Functional Mobility- Device other (see comments)  no ae  -SW     Functional Mobility-Distance (Feet) 6  -       Row Name 10/13/23 0910          Activities of Daily Living    BADL Assessment/Intervention lower body dressing;grooming  -       Row Name 10/13/23 0910          Lower Body Dressing Assessment/Training    [place] Level (Lower Body Dressing) lower body dressing skills;socks;set up  -SW     Position (Lower Body Dressing) edge of bed sitting;unsupported  sitting  -SW       Row Name 10/13/23 0910          Grooming Assessment/Training    [place] Level (Grooming) grooming skills;wash face, hands;set up  -SW     Position (Grooming) edge of bed sitting  -SW               User Key  (r) = Recorded By, (t) = Taken By, (c) = Cosigned By      Initials Name Provider Type    Alisa Cedillo OT Occupational Therapist                   Obj/Interventions       Row Name 10/13/23 0910          Sensory Assessment (Somatosensory)    Sensory Assessment (Somatosensory) UE sensation intact  -SW       [facility] Name 10/13/23 0910          Vision Assessment/Intervention    Visual Impairment/Limitations WFL;corrective lenses full-time  -       Row Name 10/13/23 0910          Range of Motion Comprehensive    General Range of Motion bilateral upper extremity ROM WFL  -SW       [facility] Name 10/13/23 0910          Strength Comprehensive (MMT)    General Manual Muscle Testing (MMT) Assessment upper extremity strength deficits identified  -     Comment, General Manual Muscle Testing (MMT) Assessment BUEs 4+/5  -SW       [facility] Name 10/13/23 0910          Balance    Balance Assessment sitting static balance;sitting dynamic balance;sit to stand dynamic balance;standing static balance;standing dynamic balance  -SW     Static Sitting Balance set-up  -SW     Dynamic Sitting Balance set-up  -SW     Position, Sitting Balance unsupported  -SW     Sit to Stand Dynamic Balance contact guard  -SW     Static Standing Balance standby assist  -SW     Dynamic Standing Balance contact guard  -SW     Position/Device Used, Standing Balance unsupported  -SW     Balance Interventions sitting;standing;sit to stand;supported;static;dynamic;minimal challenge;occupation based/functional task  -SW               User Key  (r) = Recorded By, (t) = Taken By, (c) = Cosigned By      Initials Name Provider Type    Alisa Cedillo OT Occupational Therapist                   Goals/Plan       Row Name 10/13/23 0910          Transfer  Goal 1 (OT)    Activity/Assistive Device (Transfer Goal 1, OT) toilet  -SW     [place] Level/Cues Needed (Transfer Goal 1, OT) standby assist  -SW     Time Frame (Transfer Goal 1, OT) long term goal (LTG);by discharge  -SW     Progress/Outcome (Transfer Goal 1, OT) goal ongoing  -       Row Name 10/13/23 0910          Problem Specific Goal 1 (OT)    Problem Specific Goal 1 (OT) Pt will stand times 8 minutes while completing adl tasks with improved endurance and no lob.  -SW     Time Frame (Problem Specific Goal 1, OT) long term goal (LTG);by discharge  -SW     Progress/Outcome (Problem Specific Goal 1, OT) goal ongoing  -       Row Name 10/13/23 0910          Therapy Assessment/Plan (OT)    Planned Therapy Interventions (OT) activity tolerance training;adaptive equipment training;BADL retraining;functional balance retraining;transfer/mobility retraining;strengthening exercise;patient/caregiver education/training  -               User Key  (r) = Recorded By, (t) = Taken By, (c) = Cosigned By      Initials Name Provider Type    Alisa Cedillo, OT Occupational Therapist                   Clinical Impression       Row Name 10/13/23 0910          Pain Assessment    Pretreatment Pain Rating 3/10  -SW     Posttreatment Pain Rating 3/10  -SW     Pain Location generalized  -SW     Pain Location - abdomen  -SW     Pain Intervention(s) Repositioned;Rest  -SW       Row Name 10/13/23 0910          Plan of Care Review    Plan of Care Reviewed With patient;son  -SW     Outcome Evaluation OT eval complete. Pt presents with decreased act valeri, discomfort to abdomen, and overall weakness.  She completed bed mob with setup, cga for sts and taking a few steps at bedside, setup for grooming and  donning socks. Recommend IPOT and HHOT at d/c.  -       Row Name 10/13/23 0910          Therapy Assessment/Plan (OT)    Rehab Potential (OT) good, to achieve stated therapy goals  -     Criteria for Skilled Therapeutic  Interventions Met (OT) yes;meets criteria;skilled treatment is necessary  -     Therapy Frequency (OT) daily  -       Row Name 10/13/23 0910          Therapy Plan Review/Discharge Plan (OT)    Anticipated Discharge Disposition (OT) home with home health;home with assist  -       Row Name 10/13/23 0910          Vital Signs    Pre Systolic BP Rehab 104  -SW     Pre Treatment Diastolic BP 68  -SW     Post Systolic BP Rehab 134  -SW     Post Treatment Diastolic BP 80  -SW     Pretreatment Heart Rate (beats/min) 126  -SW     Posttreatment Heart Rate (beats/min) 135  -SW     O2 Delivery Pre Treatment room air  -SW     O2 Delivery Intra Treatment room air  -SW     O2 Delivery Post Treatment room air  -SW     Pre Patient Position Supine  -SW     Intra Patient Position Standing  -SW     Post Patient Position Supine  -SW       Row Name 10/13/23 0910          Positioning and Restraints    Pre-Treatment Position in bed  -SW     Post Treatment Position bed  -SW     In Bed notified nsg;supine;fowlers;call light within reach;encouraged to call for assist;exit alarm on;side rails up x2;with family/caregiver  -               User Key  (r) = Recorded By, (t) = Taken By, (c) = Cosigned By      Initials Name Provider Type    Alisa Cedillo, OT Occupational Therapist                   Outcome Measures       Row Name 10/13/23 0951          How much help from another is currently needed...    Putting on and taking off regular lower body clothing? 4  -SW     Bathing (including washing, rinsing, and drying) 3  -SW     Toileting (which includes using toilet bed pan or urinal) 3  -SW     Putting on and taking off regular upper body clothing 4  -SW     Taking care of personal grooming (such as brushing teeth) 4  -SW     Eating meals 4  -SW     AM-PAC 6 Clicks Score (OT) 22  -SW       Row Name 10/13/23 0800          How much help from another person do you currently need...    Turning from your back to your side while in flat bed  without using bedrails? 3  -DS     Moving from lying on back to sitting on the side of a flat bed without bedrails? 3  -DS     Moving to and from a bed to a chair (including a wheelchair)? 3  -DS     Standing up from a chair using your arms (e.g., wheelchair, bedside chair)? 3  -DS     Climbing 3-5 steps with a railing? 3  -DS     To walk in hospital room? 3  -DS     AM-PAC 6 Clicks Score (PT) 18  -DS     Highest level of mobility 6 --> Walked 10 steps or more  -DS       Row Name 10/13/23 0951          Functional Assessment    Outcome Measure Options AM-PAC 6 Clicks Daily Activity (OT)  -OLIVER               User Key  (r) = Recorded By, (t) = Taken By, (c) = Cosigned By      Initials Name Provider Type    Sherri Rose, RN Registered Nurse    Alisa Cedillo OT Occupational Therapist                    Occupational Therapy Education       Title: PT OT SLP Therapies (In Progress)       Topic: Occupational Therapy (In Progress)       Point: ADL training (Done)       Description:   Instruct learner(s) on proper safety adaptation and remediation techniques during self care or transfers.   Instruct in proper use of assistive devices.                  Learning Progress Summary             Patient Acceptance, E, VU by OLIVER at 10/13/2023 0952   Family Acceptance, E, VU by OLIVER at 10/13/2023 0952                         Point: Home exercise program (Not Started)       Description:   Instruct learner(s) on appropriate technique for monitoring, assisting and/or progressing therapeutic exercises/activities.                  Learner Progress:  Not documented in this visit.              Point: Precautions (Done)       Description:   Instruct learner(s) on prescribed precautions during self-care and functional transfers.                  Learning Progress Summary             Patient Acceptance, E, VU by OLIVER at 10/13/2023 0952   Family Acceptance, E, VU by OLIVER at 10/13/2023 0952                         Point: Body mechanics (Not  Started)       Description:   Instruct learner(s) on proper positioning and spine alignment during self-care, functional mobility activities and/or exercises.                  Learner Progress:  Not documented in this visit.                              User Key       Initials Effective Dates Name Provider Type Discipline     06/16/21 -  Alisa Weir, OT Occupational Therapist OT                  OT Recommendation and Plan  Planned Therapy Interventions (OT): activity tolerance training, adaptive equipment training, BADL retraining, functional balance retraining, transfer/mobility retraining, strengthening exercise, patient/caregiver education/training  Therapy Frequency (OT): daily  Plan of Care Review  Plan of Care Reviewed With: patient, son  Outcome Evaluation: OT eval complete. Pt presents with decreased act valeri, discomfort to abdomen, and overall weakness.  She completed bed mob with setup, cga for sts and taking a few steps at bedside, setup for grooming and  donning socks. Recommend IPOT and HHOT at d/c.     Time Calculation:   Evaluation Complexity (OT)  Review Occupational Profile/Medical/Therapy History Complexity: brief/low complexity  Assessment, Occupational Performance/Identification of Deficit Complexity: 1-3 performance deficits  Clinical Decision Making Complexity (OT): problem focused assessment/low complexity  Overall Complexity of Evaluation (OT): low complexity     Time Calculation- OT       Row Name 10/13/23 0910             Time Calculation- OT    OT Start Time 0910  -SW      OT Received On 10/13/23  -SW      OT Goal Re-Cert Due Date 10/23/23  -SW         Timed Charges    47476 - OT Self Care/Mgmt Minutes 15  -SW         Untimed Charges    OT Eval/Re-eval Minutes 38  -SW         Total Minutes    Timed Charges Total Minutes 15  -SW      Untimed Charges Total Minutes 38  -SW       Total Minutes 53  -SW                User Key  (r) = Recorded By, (t) = Taken By, (c) = Cosigned By      Initials  Name Provider Type     Alisa Weir OT Occupational Therapist                  Therapy Charges for Today       Code Description Service Date Service Provider Modifiers Qty    55163919520 HC OT SELF CARE/MGMT/TRAIN EA 15 MIN 10/13/2023 Alisa Weir OT GO 1    [id number]  OT EVAL LOW COMPLEXITY 3 10/13/2023 Alisa Weir OT GO 1                 Alisa Weir OT  10/13/2023

## 2023-10-13 NOTE — PLAN OF CARE
Problem: Adult Inpatient Plan of Care  Goal: Plan of Care Review  Outcome: Ongoing, Progressing  Goal: Patient-Specific Goal (Individualized)  Outcome: Ongoing, Progressing  Goal: Absence of Hospital-Acquired Illness or Injury  Outcome: Ongoing, Progressing  Goal: Optimal Comfort and Wellbeing  Outcome: Ongoing, Progressing  Goal: Readiness for Transition of Care  Outcome: Ongoing, Progressing     Problem: Skin Injury Risk Increased  Goal: Skin Health and Integrity  Outcome: Ongoing, Progressing     Problem: Pain Acute  Goal: Acceptable Pain Control and Functional Ability  Outcome: Ongoing, Progressing     Problem: Fall Injury Risk  Goal: Absence of Fall and Fall-Related Injury  Outcome: Ongoing, Progressing

## 2023-10-13 NOTE — CONSULTS
[facility] at [facility]  Cardiovascular Consultation Note    Reason for consultation: #1 dilated cardiomyopathy with sinus tachycardia    History of Present Illness:  72-year-old unfortunate female with a history of CKD, diabetes, hyperlipidemia and LV dysfunction.  She has a biventricular device that was placed at .  Her last echocardiogram done showed a EF of 26 to 28%.  The patient came in with nausea and vomiting abdominal pain was found to have a small bowel obstruction and subsequent large ovarian mass.  Because of the small bowel obstruction patient not been able to take her cardiac meds.  The patient and her sons are in the room.  Tells me that she had a cardiac cath in  which was negative.  She has had no further testing since that time.  Apparently 1 time I try to do dobutamine but she started having severe chest pain and the test was canceled.  The patient denies any history of tightness heaviness squeezing pressure chest jaw throat arms.  Has occasional dyspnea on exertion.  No edema or claudication.  The patient tells me that when she is on her Corlanor dose that her heart rate runs in the 90s.  She denies palpitations.    Cardiac risk factors: #1 age greater than 65 #2 diabetes #3 hyperlipidemia #4 prior smoker    Past medical and surgical history, social and family history reviewed in EMR.    REVIEW OF SYSTEMS:     Past Medical History:   Diagnosis Date    Arthritis     CHF (congestive heart failure)     COPD (chronic obstructive pulmonary disease)     Diabetes mellitus     Hyperlipidemia     Renal disorder      Past Surgical History:   Procedure Laterality Date    CHOLECYSTECTOMY      DILATATION AND CURETTAGE       Social History     Socioeconomic History    Marital status:    Tobacco Use    Smoking status: Former     Types: Cigarettes     Quit date:      Years since quittin.8    Smokeless tobacco: Never   Vaping Use    Vaping Use: Never used   Substance  "and Sexual Activity    Alcohol use: Not Currently    Drug use: Never    Sexual activity: Defer     History reviewed. No pertinent family history.    H&P ROS reviewed and pertinent CV ROS as noted in HPI.    Cardiac: Patient has a known history of LV dysfunction and has a biventricular device/negative cardiac cath in 2006.  No anginal type pain  Respiratory: Complains of occasional dyspnea.  Occasionally wheezes.  Lower Extremities: No edema or claudication  GI: Complains of abdominal pain and abdominal distention and anorexia  Neuro: No history of stroke TIA or neurologic event  Hematology: No history of bleeding diathesis ecchymosis or petechia  Renal: Has CKD  Musculoskeletal: Patient has diabetes  Endocrine: Patient has diabetes  Constitutional: Patient had anorexia, weight loss  Psych: No depression or suicidal ideation      Physical Exam: General pleasant elderly female lying at a 30 degree angle not dyspneic tachypneic heart rate 120s       HEENT: No JVP or bruits, no icterus       Respiratory: Equal bilateral symmetrical expansion\" bilaterally       Cardiovascular: Tachycardic with a gallop and no obvious murmur and no edema to palpation       GI: Distended       Lower Extremities: No lesions       Neuro: Facial expressions are symmetric moves all 4 extremities       Skin: Warm and dry no edema palpation       Psych: Pleasant affect oriented x3    Results Review: EKG shows sinus tachycardia with a paced ventricular rhythm.  Recent device interrogation showed 92% BiV pacing.  Previous echocardiogram EF 26 to 28%   is markedly elevated at 776.  BNP was 1889.  Creatinine was 1.7 on admission down to 1.5.  GFR was 31.5 on admission which is improved to 36.3.  Hemoglobin is 10.1           Vital Sign Min/Max for last 24 hours  Temp  Min: 97.6 °F (36.4 °C)  Max: 98.6 °F (37 °C)   BP  Min: 104/64  Max: 130/79   Pulse  Min: 119  Max: 133   Resp  Min: 16  Max: 18   SpO2  Min: 85 %  Max: 91 %   No data recorded    "   Intake/Output Summary (Last 24 hours) at 10/13/2023 1345  Last data filed at 10/13/2023 0600  Gross per 24 hour   Intake 1200 ml   Output 50 ml   Net 1150 ml             Current Facility-Administered Medications:     acetaminophen (TYLENOL) tablet 650 mg, 650 mg, Oral, Q4H PRN, Judy Lujan DO    cholestyramine light packet 4 g, 1 packet, Oral, Daily, Judy Lujan DO, 4 g at 10/13/23 0834    dextrose (D50W) (25 g/50 mL) IV injection 25 g, 25 g, Intravenous, Q15 Min PRN, Judy Lujan DO    dextrose (D50W) (25 g/50 mL) IV injection 25 g, 25 g, Intravenous, Q15 Min PRN, Charito Amaro MD    dextrose (GLUTOSE) oral gel 15 g, 15 g, Oral, Q15 Min PRN, Judy Lujan DO, 15 g at 10/13/23 0645    donepezil (ARICEPT) tablet 5 mg, 5 mg, Oral, Nightly, Judy Lujan DO, 5 mg at 10/12/23 2158    famotidine (PEPCID) tablet 20 mg, 20 mg, Oral, Daily, Charito Amaro MD    Fleets enema 1 enema, 1 enema, Rectal, Daily, Dana Draper MD, 1 enema at 10/13/23 0834    glucagon (GLUCAGEN) injection 1 mg, 1 mg, Intramuscular, Q15 Min PRN, Judy Lujan DO    heparin (porcine) 5000 UNIT/ML injection 5,000 Units, 5,000 Units, Subcutaneous, Q8H, Judy Lujan DO, 5,000 Units at 10/13/23 0505    hydrALAZINE (APRESOLINE) injection 10 mg, 10 mg, Intravenous, Q6H PRN, Judy Lujan DO    insulin detemir (LEVEMIR) injection 10 Units, 10 Units, Subcutaneous, Nightly, Charito Amaro MD, 10 Units at 10/12/23 2158    insulin regular (humuLIN R,novoLIN R) injection 2-9 Units, 2-9 Units, Subcutaneous, Q6H, Charito Amaro MD, 4 Units at 10/12/23 1310    ipratropium-albuterol (DUO-NEB) nebulizer solution 3 mL, 3 mL, Nebulization, Q4H PRN, Judy Lujan, DO    LORazepam (ATIVAN) injection 0.5 mg, 0.5 mg, Intravenous, Q12H PRN, Charito Amaro MD, 0.5 mg at 10/12/23 2158    Med Rec Consult, , Does not apply, Daily, Herson Wilson, [facility]    morphine injection 2 mg, 2 mg, Intravenous, Q4H PRN,  Judy Lujan R, DO, 2 mg at 10/12/23 2158    ondansetron (ZOFRAN) injection 4 mg, 4 mg, Intravenous, Q6H PRN, Judy Lujan R, DO, 4 mg at 10/11/23 2323    Pharmacy Consult, , Does not apply, Continuous PRN, Charito Amaro MD    phenol (CHLORASEPTIC) 1.4 % liquid 1 spray, 1 spray, Mouth/Throat, Q2H PRN, Dana Draper MD    [COMPLETED] Insert Peripheral IV, , , Once **AND** sodium chloride 0.9 % flush 10 mL, 10 mL, Intravenous, PRN, Le, Judy R, DO    sodium chloride 0.9 % flush 10 mL, 10 mL, Intravenous, Q12H, Judy Lujan R, DO, 10 mL at 10/13/23 0835    sodium chloride 0.9 % flush 10 mL, 10 mL, Intravenous, PRN, Le, Judy R, DO    sodium chloride 0.9 % infusion 40 mL, 40 mL, Intravenous, PRN, Le, Judy R, DO    sodium chloride 0.9 % infusion, 50 mL/hr, Intravenous, Continuous, Le, Judy R, DO, Last Rate: 50 mL/hr at 10/12/23 2157, 50 mL/hr at 10/12/23 2157    Lab Review:   Results from last 7 days   Lab Units 10/12/23  0416 10/11/23  1340   WBC 10*3/mm3 9.53 14.40*   HEMOGLOBIN g/dL 10.8* 12.0   PLATELETS 10*3/mm3 450 585*     Results from last 7 days   Lab Units 10/12/23  0416 10/11/23  1354 10/11/23  1347 10/11/23  1340   SODIUM mmol/L 130*  --   --  133*   POTASSIUM mmol/L 4.0  --   --  4.5   CO2 mmol/L 23.0  --   --  22.0   BUN mg/dL 58*  --   --  58*   CREATININE mg/dL 1.52* 2.10 2.10* 1.72*   GLUCOSE mg/dL 285*  --   --  358*     Estimated Creatinine Clearance: 27.4 mL/min (A) (by C-G formula based on SCr of 1.52 mg/dL (H)).    Results from last 7 days   Lab Units 10/12/23  0416   HEMOGLOBIN A1C % 8.80*     .lipd  Lab Results   Component Value Date    AST 30 10/12/2023    ALT 10 10/12/2023       Radiology Reports:  Imaging Results (Last 72 Hours)       Procedure Component Value Units Date/Time    XR Abdomen KUB [[id number]] Collected: 10/13/23 0454     Updated: 10/13/23 0458    Narrative:      XR ABDOMEN KUB    Date of Exam: 10/13/2023 4:32 AM EDT    Indication:  replacement of NG tube    Comparison: 10/13/2023 at 0252 hours.    Findings:  Gastric suction tube is repositioned with the tip in the mid stomach. There are distended bowel loops in the upper abdomen. There is mild bibasilar atelectasis. No pneumoperitoneum.      Impression:      Impression:  Gastric suction tube tip is in the mid stomach.        Electronically Signed: Faraz Roberts MD    10/13/2023 4:54 AM EDT    Workstation ID: [id number]    XR Abdomen KUB [[id number]] Collected: 10/13/23 0349     Updated: 10/13/23 0353    Narrative:      XR ABDOMEN KUB    Date of Exam: 10/13/2023 3:09 AM EDT    Indication: NG placement    Comparison: 10/11/2023 and CT abdomen and pelvis same date.    Findings:  Gastric suction tube is folded in the distal esophagus. There are distended bowel loops in the abdomen measuring up to 41 mm consistent with ongoing obstruction. No evidence of pneumoperitoneum.      Impression:      Impression:  Gastric suction tube is folded in the distal esophagus.        Electronically Signed: Faraz Roberts MD    10/13/2023 3:50 AM EDT    Workstation ID: [id number]    US Paracentesis [[id number]] Collected: 10/12/23 1607    Specimen: Body Fluid Updated: 10/12/23 1612    Narrative:      US PARACENTESIS     History: concern for ovarian malignancy, malignant ascites     : Eron Aguilar MD.     Modality: Ultrasonography                       Sedation: None.    Anesthesia:  Lidocaine 1% local infiltration.    Estimated blood loss:  0 cc.            Technique:  A thorough discussion of the risks, benefits, and alternatives of the procedure, and if applicable, moderate sedation, was carried out with the patient. They were encouraged to ask any questions. Any questions were answered. They verbalized   understanding. A written informed consent was then signed.      A multi-component timeout was performed prior to starting the procedure using the departmental protocol.     The procedure room  personnel used personal protective equipment. The operators used sterile gloves and if indicated, sterile gowns. The surgical site was prepped with chlorhexidine gluconate  and draped in the maximal applicable sterile fashion.    A preliminary ultrasonography was performed to assess the target and determine a safe access site. It showed a small pocket ascites in the right side of the abdomen and a backdrop severely distended fluid and fluid and air-filled loops of bowel in the   vicinity. Pertinent ultrasound images were stored to the PACS for documentation. Discussion was carried out with the hospitalist. They indicated that the fluid was needed for diagnostic purposes. The patient understood the risks and agreed to proceed.    The access site was sterilely prepped and draped. Local anesthesia was administered. A dermatotomy was performed if needed. A catheter over the needle system was advanced into the peritoneal cavity under real-time ultrasound guidance. Straw colored fluid   was aspirated and the plastic catheter advanced into the peritoneum. The catheter was then connected to a fluid recovery system. At the end of the procedure, the catheter was withdrawn and an aseptic dressing applied. The patient tolerated the procedure   well.    After uneventful recovery recovery, the patient was discharged from the department in stable condition.    The total amount of fluid recovered is given below.    Albumin was infused intravenously if ordered by the referring doctor.    Complications: None immediate.    Specimen:  The specimen was labeled and sent to the lab in appropriate carriers & containers if such was requested by the ordering provider.      Impression:      Impression:                                                                Successful ultrasound-guided paracentesis using a Right upper quadrant access with recovery of 0.7 liters of fluid as described above.    Thank you for the opportunity to assist in  the care of your patient.      Electronically Signed: Eron Aguilar MD    10/12/2023 4:09 PM EDT    Workstation ID: [id number]    XR Abdomen KUB [[id number]] Collected: 10/11/23 1925     Updated: 10/11/23 1929    Narrative:      XR ABDOMEN KUB    Date of Exam: 10/11/2023 5:58 PM CDT    Indication: ng tube placement    Comparison: None available.    Findings:  There is nasogastric tube with tip in the body of the stomach. There are dilated loops of small intestine.      Impression:      Impression:  There is nasogastric tube with tip in the body of the stomach      Electronically Signed: Tres Bauer MD    10/11/2023 6:26 PM CDT    Workstation ID: [id number]    CT Abdomen Pelvis Without Contrast [[id number]] Collected: 10/11/23 1553     Updated: 10/11/23 1612    Narrative:      CT ABDOMEN PELVIS WO CONTRAST    Date of Exam: 10/11/2023 2:43 PM CDT    Indication: abd pain, distension, recent dx of uterine mass, Cr 2.1.    Comparison: None available.    Technique: Axial CT images were obtained of the abdomen and pelvis without the administration of contrast. Reconstructed coronal and sagittal images were also obtained. Automated exposure control and iterative construction methods were used.      Findings:  LUNG BASES:  Unremarkable without mass or infiltrate.    LIVER:  Unremarkable parenchyma without focal lesion.  BILIARY/GALLBLADDER: Cholecystectomy  SPLEEN:  Unremarkable  PANCREAS:  Unremarkable  ADRENAL:  Unremarkable  KIDNEYS:  Unremarkable parenchyma with no solid mass identified. No obstruction.  No calculus identified.  GASTROINTESTINAL/MESENTERY: Dilated small intestine measuring up to 4 cm in diameter with somewhat gradual tapering in the right central lower abdomen in the region of abdominopelvic mass. There is likely an element of mass effect/partial obstruction   related to the mass. Distal small intestine is decompressed. There is a moderate proximal and mid fecal load.  AORTA/IVC:  Normal  caliber.    RETROPERITONEUM/LYMPH NODES: There are enlarged retroperitoneal lymph nodes including:  *2.2 x 2.0 cm left para-aortic mid abdominal node (image 64, series 2)  *1.5 x 1.4 cm left para-aortic lower abdominal node (image 70, series 2)    REPRODUCTIVE: There is a heterogeneous 14.9 x 11.7 cm mass within the central upper pelvis/lower abdomen. While this is adjacent to the uterine fundus it does not appear to be in direct communication and is most suggestive of an ovarian neoplasm likely   arising from the right ovary. There is a heterogeneously enlarged left ovary measuring 5.0 x 4.6 cm.  BLADDER:  Unremarkable    OSSEUS STRUCTURES:  Typical for age with no acute process identified.    There is small to moderate volume ascites. There are areas of mesenteric spider webbing/infiltration with some suggestion of nodularity worrisome for carcinomatosis. However, this is difficult to assess given lack of intravenous contrast.      Impression:      Impression:  1.Large central abdominopelvic mass, likely arising from the right ovary.  2.Dilated small intestine with somewhat gradual transition in the right anterior lower abdomen near the after mentioned mass suggestive of at least partial mass effect/obstruction.  3.Small to moderate volume ascites with imaging features concerning for carcinomatosis.  4.Enlarged retroperitoneal lymph nodes, likely metastatic.            Electronically Signed: Tres Bauer MD    10/11/2023 3:09 PM CDT    Workstation ID: [id number]    XR Chest 1 View [[id number]] Collected: 10/11/23 1445     Updated: 10/11/23 1449    Narrative:      XR CHEST 1 VW    Date of Exam: 10/11/2023 2:10 PM EDT    Indication: abd pain, distension, recent dx of uterine cancer, ascites    Comparison: None available.    Findings:  A left subclavian ICD is in place. The lungs are clear. The heart and mediastinal contours appear normal. There is no pleural effusion. The pulmonary vasculature appears normal. The  osseous structures appear intact.      Impression:      Impression:  No acute cardiopulmonary process.      Electronically Signed: Albert Rangel MD    10/11/2023 2:46 PM EDT    Workstation ID: [id number]            Assessment/Plan: This patient has a known cardiomyopathy and has a biventricular device.  Due to small bowel obstruction she has not been able to take her medications.  She tells me when she takes her Corlanor her heart rate runs in the 90s.  She denies any exertional angina or neck or jaw pain.  Has occasional dyspnea.  The patient's last cardiac cath was in 2006 so she certainly may have ischemic heart disease but no anginal type pain.  She recently underwent a surgical procedure under general anesthesia and did well.  At this time I will restart her Corlanor at 7.5 mg twice daily.  She shows no signs of heart failure at this time        Josue Ovlera MD  10/13/23  13:45 EDT

## 2023-10-13 NOTE — PROGRESS NOTES
Gynecologic Oncology   Daily Progress Note    Subjective   No acute events overnight. Yesterday paracentesis completed, yielding 700cc fluid. She tolerated the procedure well. Patient reports feeling slightly improved today. She was able to have a BM and pass a little flatus after fleet enema overnight. She continues to have abdominal distension and discomfort,  but denies pain. She is not having nausea or emesis, NGT remains in place.  She is NPO. She is voiding spontaneously.  She is ambulating, but only to the restroom.       Objective   Temp:  [97.6 °F (36.4 °C)-98.6 °F (37 °C)] 98.2 °F (36.8 °C)  Heart Rate:  [119-133] 130  Resp:  [16-18] 18  BP: (104-130)/(64-79) 104/64  Vitals:    10/13/23 0841   BP: 104/64   Pulse: (!) 130   Resp: 18   Temp: 98.2 °F (36.8 °C)   SpO2: 91%     I/O last 3 completed shifts:  In: 1531.7 [I.V.:1531.7]  Out: 300 [Emesis/NG output:300]                 GENERAL: Alert, well-appearing female in no apparent distress.    HEENT: Sclera anicteric, normal eyelids. Head normocephalic, atraumatic. Mucus membranes moist.  Normal dentition.  Trachea midline    CARDIOVASCULAR: Tachycardic, regular rhythm, no murmurs, rubs, or gallops.    RESPIRATORY: Clear to auscultation bilaterally, normal respiratory effort  GASTROINTESTINAL:  Distended, diffusely tender. Hypoactive BS  GENITOURINARY: Deferred   SKIN:  Warm, dry, well-perfused.    PSYCHIATRIC: AO x3, with appropriate affect, normal thought processes  MSK/EXTREMITIES: FROMx4.  No digital cyanosis.  Symmetric. No peripheral edema.  +SCDs.    RESULTS   Recent Results (from the past 24 hour(s))   POC Glucose Once    Collection Time: 10/12/23  1:06 PM    Specimen: Blood   Result Value Ref Range    Glucose 208 (H) 70 - 130 mg/dL   POC Glucose Once    Collection Time: 10/12/23  4:16 PM    Specimen: Blood   Result Value Ref Range    Glucose 177 (H) 70 - 130 mg/dL   POC Glucose Once    Collection Time: 10/13/23 12:08 AM    Specimen: Blood   Result  Value Ref Range    Glucose 158 (H) 70 - 130 mg/dL   POC Glucose Once    Collection Time: 10/13/23  6:12 AM    Specimen: Blood   Result Value Ref Range    Glucose 63 (L) 70 - 130 mg/dL   POC Glucose Once    Collection Time: 10/13/23  7:32 AM    Specimen: Blood   Result Value Ref Range    Glucose 76 70 - 130 mg/dL   POC Glucose Once    Collection Time: 10/13/23 11:11 AM    Specimen: Blood   Result Value Ref Range    Glucose 84 70 - 130 mg/dL          Assessment & Plan   This is a 72 y.o. woman with h/o post menopausal bleeding and new SBO associated with adnexal mass.      Adnexal mass  SBO  -presented with signs/symptoms of SBO, confirmed on imaging  -SBO currently managed conservatively with NPO status +NGT, had a BM overnight with some passage of gas   - imaging concerning for possible ovarian malignancy with 14.9x11.7cm mass associated with ascites, possible carcinomatosis, and retroperitoneal lymphadenopathy.   -Previous CA-125 ordered by primary OBGYN elevated to 486, repeat on arrival 776.   -s/p paracentesis yesterday yielding 700 cc, which is less than expected for typical serous ovarian cancer. Cytology is still pending. SAAG 0.3, indicating malignancy could be etiology for ascites, less likely cirrhosis or cardiac in origin   -long discussion yesterday regarding prognosis if this is an ovarian cancer. Patient is a poor surgical and chemotherapy candidate with HFrEF (EF 26%), DMII (A1C 8), COPD, CKD with current FIORDALIZA (baseline Cr ~1.2, now 1.5)  -also discussed surgical management of obstruction if conservative management is not successful, however, pt able to have BM and pass some flatus.   -recommend continued conservative management of SBO + daily enemas   -at this time, family leaning more towards comfort care measures if cancer is present, but still discussing   -will cont to follow until cytology results    2. PMB   -since May 2023, negative EMB and D&C   -no record of recent pap smear   -Hb  appropriate  -no further workup inpatient required at this time     Rest of care per primary team.          Soco Ashley MD  10/13/23  12:09 EDT     I saw and evaluated the patient. I agree with the findings and the plan of care as documented in the note.  I had a long talk with the patient and her family again.  We discussed everything from hospice to venting PEG to surgery.  If she did have a surgery would certainly be several hours in duration and she would end up with an ostomy I would anticipate.  I am not really sure she would survive such a surgical endeavor due to poor nutrition, I am not sure she would heal appropriately.  I also have concern that any reanastomosis of bowel with lead to perforation due to her poor nutrition.  They requested cardiology evaluate the patient to give a better assessment of cardiac perioperative risk and I think this is reasonable.  Patient had concerns that she has not been able to take her regular medications for her CHF since she has been in the hospital.  She has continued to be tachycardic.  I communicated with the hospitalist team and they are going to try to restart her home medicines.  Despite NG tube and enemas, patient's abdomen is more distended than it was yesterday.  She has had minimal bowel function with enemas.  We discussed that her bowel obstruction does not appear to be improved on review of x-rays taken in the middle the night.  I communicated with nursing staff that I wanted the NG tube put back to suction regardless of the output due to this bowel obstruction.    Dana Draper MD  10/13/23  13:20 EDT

## 2023-10-13 NOTE — CASE MANAGEMENT/SOCIAL WORK
Continued Stay Note   [facility]     Patient Name: Iqra Britt  MRN: [id number]  Today's Date: 10/13/2023    Admit Date: 10/11/2023    Plan: home with family   Discharge Plan       Row Name 10/13/23 1218       Plan    Plan home with family    Patient/Family in Agreement with Plan yes    Plan Comments CM met with the patient at the bedside. Patient's son, Chidi, present in the room. Chidi explained that he lives in [place] and his brother, Will, lives with the patient in [place]. Patient plans to return home at discharge with one of her sons to transport her. Patient denied any needs at this time. CM following.    Final Discharge Disposition Code 01 - home or self-care                   Discharge Codes    No documentation.                 Expected Discharge Date and Time       Expected Discharge Date Expected Discharge Time    Oct 16, 2023               Nancy Chen RN

## 2023-10-13 NOTE — PLAN OF CARE
Problem: Adult Inpatient Plan of Care  Goal: Plan of Care Review  10/13/2023 1836 by Sherri Kaur RN  Outcome: Ongoing, Progressing  10/13/2023 1835 by Sherri Kaur RN  Outcome: Ongoing, Progressing  Goal: Patient-Specific Goal (Individualized)  10/13/2023 1836 by Sherri Kaur RN  Outcome: Ongoing, Progressing  10/13/2023 1835 by Sherri Kaur RN  Outcome: Ongoing, Progressing  Goal: Absence of Hospital-Acquired Illness or Injury  10/13/2023 1836 by Sherri Kaur RN  Outcome: Ongoing, Progressing  10/13/2023 1835 by Sherri Kaur RN  Outcome: Ongoing, Progressing  Intervention: Identify and Manage Fall Risk  Recent Flowsheet Documentation  Taken 10/13/2023 1800 by Sherri Kaur RN  Safety Promotion/Fall Prevention:   activity supervised   assistive device/personal items within reach   clutter free environment maintained   toileting scheduled   safety round/check completed   room organization consistent  Taken 10/13/2023 1600 by Sherri Kaur RN  Safety Promotion/Fall Prevention:   activity supervised   assistive device/personal items within reach   clutter free environment maintained   toileting scheduled   safety round/check completed   room organization consistent  Taken 10/13/2023 1400 by Sherri Kaur RN  Safety Promotion/Fall Prevention:   activity supervised   assistive device/personal items within reach   clutter free environment maintained   toileting scheduled   safety round/check completed   room organization consistent  Taken 10/13/2023 1200 by Sherri Kaur RN  Safety Promotion/Fall Prevention:   activity supervised   assistive device/personal items within reach   clutter free environment maintained   toileting scheduled   safety round/check completed   room organization consistent  Taken 10/13/2023 1000 by Sherri Kaur RN  Safety Promotion/Fall Prevention:   activity supervised   assistive device/personal items within reach    clutter free environment maintained   toileting scheduled   safety round/check completed   room organization consistent  Taken 10/13/2023 0800 by Sherri Kaur RN  Safety Promotion/Fall Prevention:   activity supervised   assistive device/personal items within reach   clutter free environment maintained   toileting scheduled   safety round/check completed   room organization consistent  Intervention: Prevent Skin Injury  Recent Flowsheet Documentation  Taken 10/13/2023 1800 by Sherri Kaur RN  Body Position: position changed independently  Skin Protection:   adhesive use limited   tubing/devices free from skin contact  Taken 10/13/2023 1600 by Sherri Kaur RN  Body Position: position changed independently  Skin Protection:   adhesive use limited   tubing/devices free from skin contact  Taken 10/13/2023 1400 by Sherri Kaur RN  Body Position: position changed independently  Skin Protection:   adhesive use limited   tubing/devices free from skin contact  Taken 10/13/2023 1200 by Sherri Kaur RN  Body Position: position changed independently  Skin Protection:   adhesive use limited   tubing/devices free from skin contact  Taken 10/13/2023 1000 by Sherri Kaur RN  Body Position: position changed independently  Skin Protection:   adhesive use limited   tubing/devices free from skin contact  Taken 10/13/2023 0800 by Sherri Kaur RN  Body Position: position changed independently  Skin Protection:   adhesive use limited   tubing/devices free from skin contact  Intervention: Prevent and Manage VTE (Venous Thromboembolism) Risk  Recent Flowsheet Documentation  Taken 10/13/2023 1800 by Sherri Kaur RN  Activity Management: activity encouraged  Taken 10/13/2023 1600 by Sherri Kaur RN  Activity Management: activity encouraged  Taken 10/13/2023 1400 by Sherri Kaur RN  Activity Management: activity encouraged  Taken 10/13/2023 1200 by Sherri Kaur  RN  Activity Management: activity encouraged  Taken 10/13/2023 1000 by Sherri Kaur RN  Activity Management: activity encouraged  Taken 10/13/2023 0800 by Sherri Kaur RN  Activity Management: activity encouraged  VTE Prevention/Management: (heparin) other (see comments)  Intervention: Prevent Infection  Recent Flowsheet Documentation  Taken 10/13/2023 1800 by Sherri Kaur RN  Infection Prevention: single patient room provided  Taken 10/13/2023 1600 by Sherri Kaur RN  Infection Prevention: single patient room provided  Taken 10/13/2023 1400 by Sherri Kaur RN  Infection Prevention: single patient room provided  Taken 10/13/2023 1200 by Sherri Kaur RN  Infection Prevention: single patient room provided  Taken 10/13/2023 1000 by Sherri Kaur RN  Infection Prevention: single patient room provided  Taken 10/13/2023 0800 by Sherri Kaur RN  Infection Prevention: single patient room provided  Goal: Optimal Comfort and Wellbeing  10/13/2023 1836 by Sherri Kaur RN  Outcome: Ongoing, Progressing  10/13/2023 1835 by Sherri Kaur RN  Outcome: Ongoing, Progressing  Intervention: Provide Person-Centered Care  Recent Flowsheet Documentation  Taken 10/13/2023 1800 by Sherri Kaur RN  Trust Relationship/Rapport:   care explained   choices provided   emotional support provided   empathic listening provided   questions answered   questions encouraged   reassurance provided   thoughts/feelings acknowledged  Taken 10/13/2023 1600 by Sherri Kaur RN  Trust Relationship/Rapport:   care explained   choices provided   emotional support provided   empathic listening provided   questions answered   thoughts/feelings acknowledged   questions encouraged   reassurance provided  Taken 10/13/2023 1400 by Sherri Kaur RN  Trust Relationship/Rapport:   care explained   choices provided   emotional support provided   empathic listening provided   questions  answered   questions encouraged   reassurance provided   thoughts/feelings acknowledged  Taken 10/13/2023 1200 by Sherri Kaur RN  Trust Relationship/Rapport:   care explained   choices provided   emotional support provided   empathic listening provided   questions encouraged   questions answered   reassurance provided   thoughts/feelings acknowledged  Taken 10/13/2023 1000 by Sherri Kaur RN  Trust Relationship/Rapport:   care explained   choices provided   emotional support provided   empathic listening provided   questions answered   questions encouraged   reassurance provided   thoughts/feelings acknowledged  Taken 10/13/2023 0800 by Sherri Kaur RN  Trust Relationship/Rapport:   care explained   choices provided   emotional support provided   empathic listening provided   questions answered   reassurance provided   questions encouraged   thoughts/feelings acknowledged  Goal: Readiness for Transition of Care  10/13/2023 1836 by Sherri Kaur RN  Outcome: Ongoing, Progressing  10/13/2023 1835 by Sherri Kaur RN  Outcome: Ongoing, Progressing     Problem: Skin Injury Risk Increased  Goal: Skin Health and Integrity  10/13/2023 1836 by Sherri Kaur RN  Outcome: Ongoing, Progressing  10/13/2023 1835 by Sherri Kaur RN  Outcome: Ongoing, Progressing  Intervention: Optimize Skin Protection  Recent Flowsheet Documentation  Taken 10/13/2023 1800 by Sherri Kaur RN  Pressure Reduction Techniques:   frequent weight shift encouraged   heels elevated off bed   positioned off wounds  Head of Bed (HOB) Positioning: HOB at 30-45 degrees  Pressure Reduction Devices:   pressure-redistributing mattress utilized   positioning supports utilized   heel offloading device utilized  Skin Protection:   adhesive use limited   tubing/devices free from skin contact  Taken 10/13/2023 1600 by Sherri Kaur RN  Pressure Reduction Techniques:   frequent weight shift encouraged   heels  elevated off bed   positioned off wounds  Head of Bed (HOB) Positioning: HOB at 30-45 degrees  Pressure Reduction Devices:   pressure-redistributing mattress utilized   positioning supports utilized   heel offloading device utilized  Skin Protection:   adhesive use limited   tubing/devices free from skin contact  Taken 10/13/2023 1400 by Sherri Kaur RN  Pressure Reduction Techniques:   frequent weight shift encouraged   heels elevated off bed   positioned off wounds  Head of Bed (HOB) Positioning: HOB at 30-45 degrees  Pressure Reduction Devices:   pressure-redistributing mattress utilized   positioning supports utilized   heel offloading device utilized  Skin Protection:   adhesive use limited   tubing/devices free from skin contact  Taken 10/13/2023 1200 by Sherri Kaur RN  Pressure Reduction Techniques:   frequent weight shift encouraged   heels elevated off bed   positioned off wounds  Head of Bed (HOB) Positioning: HOB at 30-45 degrees  Pressure Reduction Devices:   pressure-redistributing mattress utilized   positioning supports utilized   heel offloading device utilized  Skin Protection:   adhesive use limited   tubing/devices free from skin contact  Taken 10/13/2023 1000 by Sherri Kaur RN  Pressure Reduction Techniques:   frequent weight shift encouraged   heels elevated off bed   positioned off wounds  Head of Bed (HOB) Positioning: [facility] at 30-45 degrees  Pressure Reduction Devices:   pressure-redistributing mattress utilized   positioning supports utilized   heel offloading device utilized  Skin Protection:   adhesive use limited   tubing/devices free from skin contact  Taken 10/13/2023 0800 by Sherri Kaur RN  Pressure Reduction Techniques:   frequent weight shift encouraged   heels elevated off bed   positioned off wounds  Head of Bed (HOB) Positioning: [facility] at 30-45 degrees  Pressure Reduction Devices:   pressure-redistributing mattress utilized   positioning supports  utilized   heel offloading device utilized  Skin Protection:   adhesive use limited   tubing/devices free from skin contact     Problem: Pain Acute  Goal: Acceptable Pain Control and Functional Ability  10/13/2023 1836 by Sherri Kaur RN  Outcome: Ongoing, Progressing  10/13/2023 1835 by Sherri Kaur RN  Outcome: Ongoing, Progressing  Intervention: Prevent or Manage Pain  Recent Flowsheet Documentation  Taken 10/13/2023 1800 by Sherri Kaur RN  Medication Review/Management: medications reviewed  Taken 10/13/2023 1600 by Sherri Kaur RN  Medication Review/Management: medications reviewed  Taken 10/13/2023 1400 by Sherri Kaur RN  Medication Review/Management: medications reviewed  Taken 10/13/2023 1200 by Sherri Kaur RN  Medication Review/Management: medications reviewed  Taken 10/13/2023 1000 by Sherri Kaur RN  Medication Review/Management: medications reviewed  Taken 10/13/2023 0800 by Sherri Kaur RN  Medication Review/Management: medications reviewed  Intervention: Optimize Psychosocial Wellbeing  Recent Flowsheet Documentation  Taken 10/13/2023 1800 by Sherri Kaur RN  Diversional Activities: television  Taken 10/13/2023 1600 by Sherri Kaur RN  Diversional Activities: television  Taken 10/13/2023 1400 by Sherri Kaur RN  Diversional Activities: television  Taken 10/13/2023 1200 by Sherri Kaur RN  Diversional Activities: television  Taken 10/13/2023 1000 by Sherri Kaur RN  Diversional Activities: television  Taken 10/13/2023 0800 by Sherri Kaur RN  Diversional Activities: television     Problem: Fall Injury Risk  Goal: Absence of Fall and Fall-Related Injury  10/13/2023 1836 by Sherri Kaur RN  Outcome: Ongoing, Progressing  10/13/2023 1835 by Sherri Kaur RN  Outcome: Ongoing, Progressing  Intervention: Identify and Manage Contributors  Recent Flowsheet Documentation  Taken 10/13/2023 1800 by  Sherri Kaur RN  Medication Review/Management: medications reviewed  Taken 10/13/2023 1600 by Sherri Kaur RN  Medication Review/Management: medications reviewed  Taken 10/13/2023 1400 by Sherri Kaur RN  Medication Review/Management: medications reviewed  Taken 10/13/2023 1200 by Sherri Kaur RN  Medication Review/Management: medications reviewed  Taken 10/13/2023 1000 by Sherri Kaur RN  Medication Review/Management: medications reviewed  Taken 10/13/2023 0800 by Sherri Kaur RN  Medication Review/Management: medications reviewed  Intervention: Promote Injury-Free Environment  Recent Flowsheet Documentation  Taken 10/13/2023 1800 by Sherri Kaur RN  Safety Promotion/Fall Prevention:   activity supervised   assistive device/personal items within reach   clutter free environment maintained   toileting scheduled   safety round/check completed   room organization consistent  Taken 10/13/2023 1600 by Sherri Kaur RN  Safety Promotion/Fall Prevention:   activity supervised   assistive device/personal items within reach   clutter free environment maintained   toileting scheduled   safety round/check completed   room organization consistent  Taken 10/13/2023 1400 by Sherri Kaur RN  Safety Promotion/Fall Prevention:   activity supervised   assistive device/personal items within reach   clutter free environment maintained   toileting scheduled   safety round/check completed   room organization consistent  Taken 10/13/2023 1200 by Sherri Kaur RN  Safety Promotion/Fall Prevention:   activity supervised   assistive device/personal items within reach   clutter free environment maintained   toileting scheduled   safety round/check completed   room organization consistent  Taken 10/13/2023 1000 by Sherri Kaur RN  Safety Promotion/Fall Prevention:   activity supervised   assistive device/personal items within reach   clutter free environment  maintained   toileting scheduled   safety round/check completed   room organization consistent  Taken 10/13/2023 0800 by Sherri Kaur RN  Safety Promotion/Fall Prevention:   activity supervised   assistive device/personal items within reach   clutter free environment maintained   toileting scheduled   safety round/check completed   room organization consistent   Goal Outcome Evaluation:

## 2023-10-14 ENCOUNTER — APPOINTMENT (OUTPATIENT)
Dept: GENERAL RADIOLOGY | Facility: HOSPITAL | Age: 73
DRG: 737 | End: 2023-10-14
Payer: MEDICARE

## 2023-10-14 PROBLEM — I50.22 CHRONIC HFREF (HEART FAILURE WITH REDUCED EJECTION FRACTION): Status: ACTIVE | Noted: 2023-10-11

## 2023-10-14 PROBLEM — K56.609 SBO (SMALL BOWEL OBSTRUCTION): Status: ACTIVE | Noted: 2023-10-14

## 2023-10-14 LAB
ABO GROUP BLD: NORMAL
ABO GROUP BLD: NORMAL
ALBUMIN SERPL-MCNC: 2.6 G/DL (ref 3.5–5.2)
ALBUMIN/GLOB SERPL: 0.9 G/DL
ALP SERPL-CCNC: 54 U/L (ref 39–117)
ALT SERPL W P-5'-P-CCNC: 12 U/L (ref 1–33)
ANION GAP SERPL CALCULATED.3IONS-SCNC: 15 MMOL/L (ref 5–15)
ANION GAP SERPL CALCULATED.3IONS-SCNC: 20 MMOL/L (ref 5–15)
AST SERPL-CCNC: 47 U/L (ref 1–32)
BILIRUB CONJ SERPL-MCNC: 0.3 MG/DL (ref 0–0.3)
BILIRUB SERPL-MCNC: 0.5 MG/DL (ref 0–1.2)
BLD GP AB SCN SERPL QL: NEGATIVE
BUN SERPL-MCNC: 34 MG/DL (ref 8–23)
BUN SERPL-MCNC: 37 MG/DL (ref 8–23)
BUN/CREAT SERPL: 34.3 (ref 7–25)
BUN/CREAT SERPL: 39.4 (ref 7–25)
CA-I SERPL ISE-MCNC: 1.29 MMOL/L (ref 1.12–1.32)
CALCIUM SPEC-SCNC: 8 MG/DL (ref 8.6–10.5)
CALCIUM SPEC-SCNC: 8.1 MG/DL (ref 8.6–10.5)
CHLORIDE SERPL-SCNC: 107 MMOL/L (ref 98–107)
CHLORIDE SERPL-SCNC: 108 MMOL/L (ref 98–107)
CHOLEST SERPL-MCNC: 135 MG/DL (ref 0–200)
CO2 SERPL-SCNC: 19 MMOL/L (ref 22–29)
CO2 SERPL-SCNC: 21 MMOL/L (ref 22–29)
CREAT SERPL-MCNC: 0.94 MG/DL (ref 0.57–1)
CREAT SERPL-MCNC: 0.99 MG/DL (ref 0.57–1)
CRP SERPL-MCNC: 16.83 MG/DL (ref 0–0.5)
DEPRECATED RDW RBC AUTO: 47.9 FL (ref 37–54)
EGFRCR SERPLBLD CKD-EPI 2021: 60.7 ML/MIN/1.73
EGFRCR SERPLBLD CKD-EPI 2021: 64.6 ML/MIN/1.73
ERYTHROCYTE [DISTWIDTH] IN BLOOD BY AUTOMATED COUNT: 16 % (ref 12.3–15.4)
GLOBULIN UR ELPH-MCNC: 3 GM/DL
GLUCOSE BLDC GLUCOMTR-MCNC: 143 MG/DL (ref 70–130)
GLUCOSE BLDC GLUCOMTR-MCNC: 153 MG/DL (ref 70–130)
GLUCOSE BLDC GLUCOMTR-MCNC: 160 MG/DL (ref 70–130)
GLUCOSE BLDC GLUCOMTR-MCNC: 193 MG/DL (ref 70–130)
GLUCOSE SERPL-MCNC: 120 MG/DL (ref 65–99)
GLUCOSE SERPL-MCNC: 152 MG/DL (ref 65–99)
HCT VFR BLD AUTO: 31.7 % (ref 34–46.6)
HGB BLD-MCNC: 9.8 G/DL (ref 12–15.9)
MAGNESIUM SERPL-MCNC: 2.3 MG/DL (ref 1.6–2.4)
MCH RBC QN AUTO: 25.9 PG (ref 26.6–33)
MCHC RBC AUTO-ENTMCNC: 30.9 G/DL (ref 31.5–35.7)
MCV RBC AUTO: 83.6 FL (ref 79–97)
PHOSPHATE SERPL-MCNC: 4 MG/DL (ref 2.5–4.5)
PLATELET # BLD AUTO: 358 10*3/MM3 (ref 140–450)
PMV BLD AUTO: 10 FL (ref 6–12)
POTASSIUM SERPL-SCNC: 3.3 MMOL/L (ref 3.5–5.2)
POTASSIUM SERPL-SCNC: 3.5 MMOL/L (ref 3.5–5.2)
POTASSIUM SERPL-SCNC: 4.3 MMOL/L (ref 3.5–5.2)
PREALB SERPL-MCNC: 7.3 MG/DL (ref 20–40)
PROT SERPL-MCNC: 5.6 G/DL (ref 6–8.5)
RBC # BLD AUTO: 3.79 10*6/MM3 (ref 3.77–5.28)
RH BLD: POSITIVE
RH BLD: POSITIVE
SODIUM SERPL-SCNC: 144 MMOL/L (ref 136–145)
SODIUM SERPL-SCNC: 146 MMOL/L (ref 136–145)
T&S EXPIRATION DATE: NORMAL
TRIGL SERPL-MCNC: 192 MG/DL (ref 0–150)
WBC NRBC COR # BLD: 10.79 10*3/MM3 (ref 3.4–10.8)

## 2023-10-14 PROCEDURE — 86901 BLOOD TYPING SEROLOGIC RH(D): CPT

## 2023-10-14 PROCEDURE — 82248 BILIRUBIN DIRECT: CPT | Performed by: OBSTETRICS & GYNECOLOGY

## 2023-10-14 PROCEDURE — 99232 SBSQ HOSP IP/OBS MODERATE 35: CPT | Performed by: PHYSICIAN ASSISTANT

## 2023-10-14 PROCEDURE — 63710000001 INSULIN REGULAR HUMAN PER 5 UNITS: Performed by: OBSTETRICS & GYNECOLOGY

## 2023-10-14 PROCEDURE — 85027 COMPLETE CBC AUTOMATED: CPT | Performed by: HOSPITALIST

## 2023-10-14 PROCEDURE — 82465 ASSAY BLD/SERUM CHOLESTEROL: CPT | Performed by: OBSTETRICS & GYNECOLOGY

## 2023-10-14 PROCEDURE — 74018 RADEX ABDOMEN 1 VIEW: CPT

## 2023-10-14 PROCEDURE — 25010000002 ONDANSETRON PER 1 MG: Performed by: INTERNAL MEDICINE

## 2023-10-14 PROCEDURE — 83735 ASSAY OF MAGNESIUM: CPT | Performed by: OBSTETRICS & GYNECOLOGY

## 2023-10-14 PROCEDURE — 25010000002 MAGNESIUM SULFATE PER 500 MG OF MAGNESIUM

## 2023-10-14 PROCEDURE — 86900 BLOOD TYPING SEROLOGIC ABO: CPT

## 2023-10-14 PROCEDURE — 74022 RADEX COMPL AQT ABD SERIES: CPT

## 2023-10-14 PROCEDURE — 86900 BLOOD TYPING SEROLOGIC ABO: CPT | Performed by: OBSTETRICS & GYNECOLOGY

## 2023-10-14 PROCEDURE — 25010000002 POTASSIUM CHLORIDE PER 2 MEQ: Performed by: HOSPITALIST

## 2023-10-14 PROCEDURE — 84100 ASSAY OF PHOSPHORUS: CPT | Performed by: OBSTETRICS & GYNECOLOGY

## 2023-10-14 PROCEDURE — 84134 ASSAY OF PREALBUMIN: CPT | Performed by: OBSTETRICS & GYNECOLOGY

## 2023-10-14 PROCEDURE — 86901 BLOOD TYPING SEROLOGIC RH(D): CPT | Performed by: OBSTETRICS & GYNECOLOGY

## 2023-10-14 PROCEDURE — C1751 CATH, INF, PER/CENT/MIDLINE: HCPCS

## 2023-10-14 PROCEDURE — 86923 COMPATIBILITY TEST ELECTRIC: CPT

## 2023-10-14 PROCEDURE — 25010000002 HEPARIN (PORCINE) PER 1000 UNITS: Performed by: INTERNAL MEDICINE

## 2023-10-14 PROCEDURE — 84132 ASSAY OF SERUM POTASSIUM: CPT | Performed by: HOSPITALIST

## 2023-10-14 PROCEDURE — 86850 RBC ANTIBODY SCREEN: CPT | Performed by: OBSTETRICS & GYNECOLOGY

## 2023-10-14 PROCEDURE — 25010000002 POTASSIUM CHLORIDE PER 2 MEQ OF POTASSIUM

## 2023-10-14 PROCEDURE — 82948 REAGENT STRIP/BLOOD GLUCOSE: CPT

## 2023-10-14 PROCEDURE — 99233 SBSQ HOSP IP/OBS HIGH 50: CPT | Performed by: OBSTETRICS & GYNECOLOGY

## 2023-10-14 PROCEDURE — C1894 INTRO/SHEATH, NON-LASER: HCPCS

## 2023-10-14 PROCEDURE — 82330 ASSAY OF CALCIUM: CPT | Performed by: OBSTETRICS & GYNECOLOGY

## 2023-10-14 PROCEDURE — 63710000001 INSULIN DETEMIR PER 5 UNITS: Performed by: HOSPITALIST

## 2023-10-14 PROCEDURE — 25010000002 LORAZEPAM PER 2 MG: Performed by: OBSTETRICS & GYNECOLOGY

## 2023-10-14 PROCEDURE — 25010000002 CALCIUM GLUCONATE PER 10 ML

## 2023-10-14 PROCEDURE — 86140 C-REACTIVE PROTEIN: CPT | Performed by: OBSTETRICS & GYNECOLOGY

## 2023-10-14 PROCEDURE — 80053 COMPREHEN METABOLIC PANEL: CPT | Performed by: HOSPITALIST

## 2023-10-14 PROCEDURE — 84478 ASSAY OF TRIGLYCERIDES: CPT | Performed by: OBSTETRICS & GYNECOLOGY

## 2023-10-14 RX ORDER — SPIRONOLACTONE 25 MG/1
25 TABLET ORAL DAILY
Status: DISCONTINUED | OUTPATIENT
Start: 2023-10-14 | End: 2023-10-18

## 2023-10-14 RX ORDER — NICOTINE POLACRILEX 4 MG
15 LOZENGE BUCCAL
Status: DISCONTINUED | OUTPATIENT
Start: 2023-10-14 | End: 2023-10-14

## 2023-10-14 RX ORDER — DEXTROSE MONOHYDRATE 25 G/50ML
25 INJECTION, SOLUTION INTRAVENOUS
Status: DISCONTINUED | OUTPATIENT
Start: 2023-10-14 | End: 2023-10-14

## 2023-10-14 RX ORDER — SODIUM CHLORIDE 0.9 % (FLUSH) 0.9 %
10 SYRINGE (ML) INJECTION EVERY 12 HOURS SCHEDULED
Status: DISCONTINUED | OUTPATIENT
Start: 2023-10-14 | End: 2023-10-15

## 2023-10-14 RX ORDER — NICOTINE POLACRILEX 4 MG
15 LOZENGE BUCCAL
Status: DISCONTINUED | OUTPATIENT
Start: 2023-10-14 | End: 2023-10-20 | Stop reason: HOSPADM

## 2023-10-14 RX ORDER — CLONAZEPAM 0.5 MG/1
0.5 TABLET ORAL 2 TIMES DAILY
Status: DISCONTINUED | OUTPATIENT
Start: 2023-10-14 | End: 2023-10-14

## 2023-10-14 RX ORDER — POTASSIUM CHLORIDE 29.8 MG/ML
20 INJECTION INTRAVENOUS ONCE
Status: COMPLETED | OUTPATIENT
Start: 2023-10-14 | End: 2023-10-14

## 2023-10-14 RX ORDER — LORAZEPAM 2 MG/ML
0.25 INJECTION INTRAMUSCULAR EVERY 6 HOURS SCHEDULED
Qty: 40 ML | Refills: 0 | Status: DISCONTINUED | OUTPATIENT
Start: 2023-10-14 | End: 2023-10-15

## 2023-10-14 RX ORDER — LORAZEPAM 2 MG/ML
1 INJECTION INTRAMUSCULAR EVERY 4 HOURS PRN
Status: DISCONTINUED | OUTPATIENT
Start: 2023-10-14 | End: 2023-10-14

## 2023-10-14 RX ORDER — ACETAMINOPHEN 325 MG/1
650 TABLET ORAL EVERY 4 HOURS PRN
Status: DISCONTINUED | OUTPATIENT
Start: 2023-10-14 | End: 2023-10-18

## 2023-10-14 RX ORDER — SODIUM CHLORIDE 0.9 % (FLUSH) 0.9 %
10 SYRINGE (ML) INJECTION AS NEEDED
Status: DISCONTINUED | OUTPATIENT
Start: 2023-10-14 | End: 2023-10-15

## 2023-10-14 RX ORDER — POTASSIUM CHLORIDE 29.8 MG/ML
20 INJECTION INTRAVENOUS
Status: DISPENSED | OUTPATIENT
Start: 2023-10-14 | End: 2023-10-14

## 2023-10-14 RX ADMIN — SACUBITRIL AND VALSARTAN 1 TABLET: 24; 26 TABLET, FILM COATED ORAL at 09:08

## 2023-10-14 RX ADMIN — POTASSIUM CHLORIDE 20 MEQ: 29.8 INJECTION, SOLUTION INTRAVENOUS at 17:05

## 2023-10-14 RX ADMIN — ONDANSETRON 4 MG: 2 INJECTION INTRAMUSCULAR; INTRAVENOUS at 16:13

## 2023-10-14 RX ADMIN — Medication 10 ML: at 21:48

## 2023-10-14 RX ADMIN — FAMOTIDINE 20 MG: 10 INJECTION, SOLUTION INTRAVENOUS at 09:08

## 2023-10-14 RX ADMIN — IVABRADINE 7.5 MG: 5 TABLET, FILM COATED ORAL at 09:08

## 2023-10-14 RX ADMIN — SPIRONOLACTONE 25 MG: 25 TABLET ORAL at 09:08

## 2023-10-14 RX ADMIN — SODIUM PHOSPHATE 1 ENEMA: 7; 19 ENEMA RECTAL at 09:36

## 2023-10-14 RX ADMIN — POTASSIUM CHLORIDE 20 MEQ: 29.8 INJECTION, SOLUTION INTRAVENOUS at 18:28

## 2023-10-14 RX ADMIN — SERTRALINE HYDROCHLORIDE 75 MG: 25 TABLET ORAL at 09:08

## 2023-10-14 RX ADMIN — DONEPEZIL HYDROCHLORIDE 5 MG: 5 TABLET, FILM COATED ORAL at 21:48

## 2023-10-14 RX ADMIN — INSULIN HUMAN 3 UNITS: 100 INJECTION, SOLUTION PARENTERAL at 23:57

## 2023-10-14 RX ADMIN — LORAZEPAM 0.25 MG: 2 INJECTION INTRAMUSCULAR; INTRAVENOUS at 14:48

## 2023-10-14 RX ADMIN — HEPARIN SODIUM 5000 UNITS: 5000 INJECTION INTRAVENOUS; SUBCUTANEOUS at 06:13

## 2023-10-14 RX ADMIN — INSULIN DETEMIR 5 UNITS: 100 INJECTION, SOLUTION SUBCUTANEOUS at 21:49

## 2023-10-14 RX ADMIN — LORAZEPAM 0.25 MG: 2 INJECTION INTRAMUSCULAR; INTRAVENOUS at 23:57

## 2023-10-14 RX ADMIN — Medication 10 ML: at 09:09

## 2023-10-14 RX ADMIN — CLONAZEPAM 0.5 MG: 0.5 TABLET ORAL at 09:09

## 2023-10-14 RX ADMIN — SMOFLIPID 250 ML: 6; 6; 5; 3 INJECTION, EMULSION INTRAVENOUS at 17:58

## 2023-10-14 RX ADMIN — CHOLESTYRAMINE 4 G: 4 POWDER, FOR SUSPENSION ORAL at 11:33

## 2023-10-14 RX ADMIN — INSULIN HUMAN 3 UNITS: 100 INJECTION, SOLUTION PARENTERAL at 17:59

## 2023-10-14 RX ADMIN — HEPARIN SODIUM 5000 UNITS: 5000 INJECTION INTRAVENOUS; SUBCUTANEOUS at 14:48

## 2023-10-14 RX ADMIN — SACUBITRIL AND VALSARTAN 1 TABLET: 24; 26 TABLET, FILM COATED ORAL at 21:48

## 2023-10-14 RX ADMIN — IVABRADINE 7.5 MG: 5 TABLET, FILM COATED ORAL at 21:47

## 2023-10-14 RX ADMIN — POTASSIUM PHOSPHATE, MONOBASIC POTASSIUM PHOSPHATE, DIBASIC: 224; 236 INJECTION, SOLUTION, CONCENTRATE INTRAVENOUS at 17:57

## 2023-10-14 RX ADMIN — Medication 10 ML: at 14:54

## 2023-10-14 NOTE — PLAN OF CARE
Problem: Adult Inpatient Plan of Care  Goal: Plan of Care Review  Outcome: Ongoing, Progressing  Goal: Patient-Specific Goal (Individualized)  Outcome: Ongoing, Progressing  Goal: Absence of Hospital-Acquired Illness or Injury  Outcome: Ongoing, Progressing  Intervention: Identify and Manage Fall Risk  Description: Perform standard risk assessment on admission using a validated tool or comprehensive approach appropriate to the patient; reassess fall risk frequently, with change in status or transfer to another level of care.  Communicate fall injury risk to interprofessional healthcare team.  Determine need for increased observation, equipment and environmental modification, such as low bed, signage and supportive, nonskid footwear.  Adjust safety measures to individual developmental age, stage and identified risk factors.  Reinforce the importance of safety and physical activity with patient and family.  Perform regular intentional rounding to assess need for position change, pain assessment and personal needs, including assistance with toileting.  Recent Flowsheet Documentation  Taken 10/14/2023 0400 by Olga Jarvis, RN  Safety Promotion/Fall Prevention:   activity supervised   assistive device/personal items within reach   clutter free environment maintained   fall prevention program maintained   lighting adjusted   mobility aid in reach   nonskid shoes/slippers when out of bed   room organization consistent   safety round/check completed  Taken 10/14/2023 0200 by Olga Jarvis, RN  Safety Promotion/Fall Prevention:   activity supervised   assistive device/personal items within reach   clutter free environment maintained   fall prevention program maintained   lighting adjusted   mobility aid in reach   nonskid shoes/slippers when out of bed   room organization consistent   safety round/check completed  Taken 10/14/2023 0000 by Olga Jarvis, RN  Safety Promotion/Fall Prevention:   activity supervised    assistive device/personal items within reach   clutter free environment maintained   fall prevention program maintained   lighting adjusted   mobility aid in reach   nonskid shoes/slippers when out of bed   room organization consistent   safety round/check completed  Taken 10/13/2023 2200 by Olga Jarvis RN  Safety Promotion/Fall Prevention:   activity supervised   assistive device/personal items within reach   clutter free environment maintained   fall prevention program maintained   lighting adjusted   mobility aid in reach   nonskid shoes/slippers when out of bed   room organization consistent   safety round/check completed  Taken 10/13/2023 2000 by Olga Jarvis RN  Safety Promotion/Fall Prevention:   activity supervised   assistive device/personal items within reach   clutter free environment maintained   fall prevention program maintained   lighting adjusted   mobility aid in reach   nonskid shoes/slippers when out of bed   room organization consistent   safety round/check completed  Intervention: Prevent Skin Injury  Description: Perform a screening for skin injury risk, such as pressure or moisture associated skin damage on admission and at regular intervals throughout hospital stay.  Keep all areas of skin (especially folds) clean and dry.  Maintain adequate skin hydration.  Relieve and redistribute pressure and protect bony prominences; implement measures based on patient-specific risk factors.  Match turning and repositioning schedule to clinical condition.  Encourage weight shift frequently; assist with reposition if unable to complete independently.  Float heels off bed; avoid pressure on the Achilles tendon.  Keep skin free from extended contact with medical devices.  Encourage functional activity and mobility, as early as tolerated.  Use aids (e.g., slide boards, mechanical lift) during transfer.  Recent Flowsheet Documentation  Taken 10/14/2023 0400 by Olga Jarvis, RN  Body Position: position  changed independently  Skin Protection:   adhesive use limited   tubing/devices free from skin contact   transparent dressing maintained   skin-to-skin areas padded   skin-to-device areas padded   skin sealant/moisture barrier applied   incontinence pads utilized  Taken 10/14/2023 0200 by Olga Jarvis RN  Body Position: position changed independently  Skin Protection:   adhesive use limited   tubing/devices free from skin contact   transparent dressing maintained   skin-to-skin areas padded   skin-to-device areas padded   skin sealant/moisture barrier applied   incontinence pads utilized  Taken 10/14/2023 0000 by Olga Jarvis RN  Body Position: position changed independently  Skin Protection:   adhesive use limited   tubing/devices free from skin contact   transparent dressing maintained   skin-to-skin areas padded   skin-to-device areas padded   skin sealant/moisture barrier applied   incontinence pads utilized  Taken 10/13/2023 2200 by Olga Jarvis RN  Body Position: position changed independently  Skin Protection:   adhesive use limited   tubing/devices free from skin contact   transparent dressing maintained   skin-to-device areas padded   skin sealant/moisture barrier applied   incontinence pads utilized  Taken 10/13/2023 2000 by Olga Jarvis RN  Body Position: position changed independently  Skin Protection:   adhesive use limited   tubing/devices free from skin contact   transparent dressing maintained   skin-to-skin areas padded   skin-to-device areas padded   incontinence pads utilized  Intervention: Prevent and Manage VTE (Venous Thromboembolism) Risk  Description: Assess for VTE (venous thromboembolism) risk.  Encourage and assist with early ambulation.  Initiate and maintain compression or other therapy, as indicated, based on identified risk in accordance with organizational protocol and provider order.  Encourage both active and passive leg exercises while in bed, if unable to  ambulate.  Recent Flowsheet Documentation  Taken 10/14/2023 0400 by Olga Jarvis RN  Activity Management: activity encouraged  Taken 10/14/2023 0200 by Olga Jarvis RN  Activity Management: activity encouraged  Taken 10/14/2023 0000 by Olga Jarvis RN  Activity Management: activity encouraged  Taken 10/13/2023 2200 by Olga Jarvis RN  Activity Management: activity encouraged  Taken 10/13/2023 2000 by Olga Jarvis RN  Activity Management: activity encouraged  VTE Prevention/Management: (heparin) other (see comments)  Intervention: Prevent Infection  Description: Maintain skin and mucous membrane integrity; promote hand, oral and pulmonary hygiene.  Optimize fluid balance, nutrition, sleep and glycemic control to maximize infection resistance.  Identify potential sources of infection early to prevent or mitigate progression of infection (e.g., wound, lines, devices).  Evaluate ongoing need for invasive devices; remove promptly when no longer indicated.  Recent Flowsheet Documentation  Taken 10/13/2023 2000 by Olga Jarvis RN  Infection Prevention:   environmental surveillance performed   equipment surfaces disinfected   hand hygiene promoted   personal protective equipment utilized  Goal: Optimal Comfort and Wellbeing  Outcome: Ongoing, Progressing  Intervention: Provide Person-Centered Care  Description: Use a family-focused approach to care.  Develop trust and rapport by proactively providing information, encouraging questions, addressing concerns and offering reassurance.  Acknowledge emotional response to hospitalization.  Recognize and utilize personal coping strategies.  Honor spiritual and cultural preferences.  Recent Flowsheet Documentation  Taken 10/13/2023 2000 by Olga Jarvis RN  Trust Relationship/Rapport:   care explained   choices provided   emotional support provided   empathic listening provided   questions answered   questions encouraged   thoughts/feelings acknowledged  Goal:  Readiness for Transition of Care  Outcome: Ongoing, Progressing   Goal Outcome Evaluation:

## 2023-10-14 NOTE — PROGRESS NOTES
[facility] at [facility]  Progress Note       LOS: 3 days   Patient Care Team:  Georgie Fernández DO as PCP - General (Family Medicine)    Chief Complaint:  follow up tachycardia     Subjective     Patient HR improved somewhat with restarting Corlanor home medication. She is not on BB as they make her feels SOB/anxious. She is 97% on RA and denies current SOB or CP.         Review of Systems:   Pertinent positives in HPI, all others reviewed and negative.      Objective       Current Facility-Administered Medications:     acetaminophen (TYLENOL) tablet 650 mg, 650 mg, Nasogastric, Q4H PRN, Charito Amaro MD    Calcium Replacement - Follow Nurse / BPA Driven Protocol, , Does not apply, PRN, Charito Amaro MD    cholestyramine light packet 4 g, 1 packet, Nasogastric, Daily, Charito Amaro MD    clonazePAM (KlonoPIN) tablet 0.5 mg, 0.5 mg, Nasogastric, BID, Charito Amaro MD, 0.5 mg at 10/14/23 0909    dextrose (D50W) (25 g/50 mL) IV injection 25 g, 25 g, Intravenous, Q15 Min PRN, Judy Lujan DO    dextrose (D50W) (25 g/50 mL) IV injection 25 g, 25 g, Intravenous, Q15 Min PRN, Charito Amaro MD    dextrose (GLUTOSE) oral gel 15 g, 15 g, Nasogastric, Q15 Min PRN, Charito Amaro MD    donepezil (ARICEPT) tablet 5 mg, 5 mg, Nasogastric, Nightly, Charito Amaro MD, 5 mg at 10/13/23 2037    famotidine (PEPCID) injection 20 mg, 20 mg, Intravenous, Daily, Charito Amaro MD, 20 mg at 10/14/23 0908    Fleets enema 1 enema, 1 enema, Rectal, Daily, Dana Draper MD, 1 enema at 10/14/23 0936    glucagon (GLUCAGEN) injection 1 mg, 1 mg, Intramuscular, Q15 Min PRN, Judy Lujan DO    heparin (porcine) 5000 UNIT/ML injection 5,000 Units, 5,000 Units, Subcutaneous, Q8H, Judy Lujan, , 5,000 Units at 10/14/23 0613    hydrALAZINE (APRESOLINE) injection 10 mg, 10 mg, Intravenous, Q6H PRN, Judy Lujan,     insulin detemir (LEVEMIR) injection 5 Units, 5 Units,  Subcutaneous, Nightly, Charito Amaro MD    insulin regular (humuLIN R,novoLIN R) injection 2-9 Units, 2-9 Units, Subcutaneous, Q6H, Charito Amaro MD, 4 Units at 10/12/23 1310    ipratropium-albuterol (DUO-NEB) nebulizer solution 3 mL, 3 mL, Nebulization, Q4H PRN, Judy Lujan DO    ivabradine HCl (CORLANOR) tablet 7.5 mg, 7.5 mg, Nasogastric, BID With Meals, Charito Amaro MD, 7.5 mg at 10/14/23 0908    LORazepam (ATIVAN) injection 0.5 mg, 0.5 mg, Intravenous, Q12H PRN, Charito Amaro MD, 0.5 mg at 10/12/23 2158    Magnesium Standard Dose Replacement - Follow Nurse / BPA Driven Protocol, , Does not apply, PRN, Charito Amaro MD    morphine injection 2 mg, 2 mg, Intravenous, Q4H PRN, Judy Lujan DO, 2 mg at 10/12/23 2158    ondansetron (ZOFRAN) injection 4 mg, 4 mg, Intravenous, Q6H PRN, Judy Lujan DO, 4 mg at 10/11/23 2323    phenol (CHLORASEPTIC) 1.4 % liquid 1 spray, 1 spray, Mouth/Throat, Q2H PRN, Dana Draper MD    Phosphorus Replacement - Follow Nurse / BPA Driven Protocol, , Does not apply, PRNPrem Mayuri V, MD    Potassium Replacement - Follow Nurse / BPA Driven Protocol, , Does not apply, PRNPrem Mayuri V, MD    sacubitril-valsartan (ENTRESTO) 24-26 MG tablet 1 tablet, 1 tablet, Nasogastric, BID, Charito Amaro MD, 1 tablet at 10/14/23 0908    sertraline (ZOLOFT) tablet 75 mg, 75 mg, Nasogastric, Daily, Charito Amaro MD, 75 mg at 10/14/23 0908    [COMPLETED] Insert Peripheral IV, , , Once **AND** sodium chloride 0.9 % flush 10 mL, 10 mL, Intravenous, PRN, Judy Lujan R, DO    sodium chloride 0.9 % flush 10 mL, 10 mL, Intravenous, Q12H, Judy Lujan, , 10 mL at 10/14/23 0909    sodium chloride 0.9 % flush 10 mL, 10 mL, Intravenous, PRN, Judy Lujan R, DO    sodium chloride 0.9 % infusion 40 mL, 40 mL, Intravenous, PRN, Judy Lujan R, DO    sodium chloride 0.9 % infusion, 50 mL/hr, Intravenous, Continuous, Judy Lujan, DO, Last Rate:  "50 mL/hr at 10/13/23 1747, 50 mL/hr at 10/13/23 1747    spironolactone (ALDACTONE) tablet 25 mg, 25 mg, Nasogastric, Daily, Charito Amaro MD, 25 mg at 10/14/23 0908    Vital Sign Min/Max for last 24 hours  Temp  Min: 97.6 °F (36.4 °C)  Max: 98.7 °F (37.1 °C)   BP  Min: 104/59  Max: 133/73   Pulse  Min: 108  Max: 122   Resp  Min: 16  Max: 18   SpO2  Min: 91 %  Max: 91 %   No data recorded   No data recorded     Flowsheet Rows      Flowsheet Row First Filed Value   Admission Height 154.9 cm (61\") Documented at 10/11/2023 1322   Admission Weight 60.3 kg (133 lb) Documented at 10/11/2023 1322              Intake/Output Summary (Last 24 hours) at 10/14/2023 1010  Last data filed at 10/14/2023 0433  Gross per 24 hour   Intake --   Output 550 ml   Net -550 ml       Physical Exam:     General Appearance:    Alert, cooperative, in no acute distress. NG in placed   Lungs:     Clear to auscultation bilaterally,respirations regular, even and  unlabored    Heart:    Regular rhythm and mildly tachycardic rate, normal S1 and S2,   no        murmur, no gallop, no rub, no click   Chest Wall:    No abnormalities observed   Abdomen:     Normal bowel sounds, no masses, no organomegaly, soft      nontender, nondistended, no guarding, no rebound                tenderness   Extremities:  Moves all extremities well, no edema, no cyanosis, no            redness              Pulses:   Pulses palpable and equal bilaterally   Skin:   No bleeding, bruising or rash        Results Review:   Results from last 7 days   Lab Units 10/14/23  0430 10/12/23  0416 10/11/23  1340   WBC 10*3/mm3 10.79 9.53 14.40*   HEMOGLOBIN g/dL 9.8* 10.8* 12.0   HEMATOCRIT % 31.7* 34.2 38.9   PLATELETS 10*3/mm3 358 450 585*     Results from last 7 days   Lab Units 10/14/23  0430 10/12/23  0416 10/11/23  1354 10/11/23  1347 10/11/23  1340   SODIUM mmol/L 144 130*  --   --  133*   POTASSIUM mmol/L 3.3* 4.0  --   --  4.5   CHLORIDE mmol/L 108* 95*  --   --  91*   CO2 " "mmol/L 21.0* 23.0  --   --  22.0   BUN mg/dL 37* 58*  --   --  58*   CREATININE mg/dL 0.94 1.52* 2.10   < > 1.72*   GLUCOSE mg/dL 120* 285*  --   --  358*    < > = values in this interval not displayed.      Results from last 7 days   Lab Units 10/12/23  0416   HEMOGLOBIN A1C % 8.80*             Results from last 7 days   Lab Units 10/11/23  1340   PROBNP pg/mL 1,889.0*     Results from last 7 days   Lab Units 10/11/23  1340   PROTIME Seconds 16.9*   INR  1.36*     Results from last 7 days   Lab Units 10/11/23  1340   HSTROP T ng/L 28*       Intake/Output Summary (Last 24 hours) at 10/14/2023 1010  Last data filed at 10/14/2023 0433  Gross per 24 hour   Intake --   Output 550 ml   Net -550 ml       I personally viewed and interpreted the patient's EKG/Telemetry data    EKG: none for review today     Telemetry: AV paced  -122 bpm    Ejection Fraction  No results found for: \"EF\"    Echo EF Estimated  No results found for: \"ECHOEFEST\"      Present on Admission:   Ovarian mass   COPD (chronic obstructive pulmonary disease)   Chronic HFrEF (heart failure with reduced ejection fraction)   CKD (chronic kidney disease), stage III   Type 2 diabetes mellitus   SBO (small bowel obstruction)    Assessment & Plan   Nonischemic Cardiomyopathy:   - s/p BiV ICD ([facility] followed at [facility]. Last interrogation 92% BiV paced.   - Echo 9/19/2023: EF 26%  - due to SBO, she has not been able to keep her medications down for the last 3 weeks. Just started back on Corlanor, Aldactone, and Entresto yesterday via NG tube   - CHF appears to be compensated at this time. Overall moderate risk from a cardiovascular standpoint considering decreased EF, but compensated at this time. Patient is currently medically optimized for surgery (cannot tolerate beta blockers),  but is still at risk for perioperative volume overload due to severe congestive heart failure. Overall compensated at this time. Of note, recently, underwent procedure with GA and " did well.   Sinus Tachycardia:   - restarted Corlanor yesterday. Normal HR in 90s. Some improvement in HRs today. Cannot tolerate BBs (she states she has tried several and they make her more SOB)  3. SBO:  - management per primary team/surgery         Electronically signed by ROSA Villa, 10/14/23, 10:10 AM EDT.

## 2023-10-14 NOTE — CONSULTS
Placed by JAMAL Espinoza RN - confirmed with 3cg.  RUE double lumen PICC with 40cm total and 0cm exposed.

## 2023-10-14 NOTE — PROGRESS NOTES
Pharmacy Parenteral Nutrition Evaluation    Iqra Britt is a  72 y.o. female receiving TPN.     Indication: Bowel Obstruction  Consulting Physician: Dr Draper    Total Fluid Rate (if stated): Will modify NS to  at 1800 at time of TPN start    Labs  Results from last 7 days   Lab Units 10/14/23  1255 10/14/23  0430 10/12/23  0416 10/11/23  1347 10/11/23  1340   SODIUM mmol/L 146* 144 130*  --  133*   POTASSIUM mmol/L 3.5 3.3* 4.0  --  4.5   CHLORIDE mmol/L 107 108* 95*  --  91*   CO2 mmol/L 19.0* 21.0* 23.0  --  22.0   BUN mg/dL 34* 37* 58*  --  58*   CREATININE mg/dL 0.99 0.94 1.52*   < > 1.72*   CALCIUM mg/dL 8.1* 8.0* 7.9*  --  8.5*   BILIRUBIN mg/dL 0.5  --  0.4  --  0.4   ALK PHOS U/L 54  --  46  --  56   ALT (SGPT) U/L 12  --  10  --  13   AST (SGOT) U/L 47*  --  30  --  31   GLUCOSE mg/dL 152* 120* 285*  --  358*    < > = values in this interval not displayed.       Results from last 7 days   Lab Units 10/14/23  1255   MAGNESIUM mg/dL 2.3   PHOSPHORUS mg/dL 4.0       Results from last 7 days   Lab Units 10/14/23  0430 10/12/23  0416 10/11/23  1340   WBC 10*3/mm3 10.79 9.53 14.40*   HEMOGLOBIN g/dL 9.8* 10.8* 12.0   HEMATOCRIT % 31.7* 34.2 38.9   PLATELETS 10*3/mm3 358 450 585*       Triglycerides   Date Value Ref Range Status   10/14/2023 192 (H) 0 - 150 mg/dL Final     Comment:     Falsely depressed results may occur on samples drawn from patients receiving N-Acetylcysteine (NAC) or Metamizole.       estimated creatinine clearance is 42.1 mL/min (by C-G formula based on SCr of 0.99 mg/dL).    Intake & Output (last 3 days)         10/11 0701  10/12 0700 10/12 0701  10/13 0700 10/13 0701  10/14 0700 10/14 0701  10/15 0700    I.V. (mL/kg) 331.7 (5.5) 1200 (20.7)      Total Intake(mL/kg) 331.7 (5.5) 1200 (20.7)      Urine (mL/kg/hr) 0 0 (0) 550 (0.4)     Emesis/NG output 250 50      Total Output 250 50 550     Net +81.7 +1150 -550             Urine Unmeasured Occurrence 2 x 3 x 3 x 1 x    Stool  Unmeasured Occurrence  1 x 3 x 1 x            Dietitian Recommendations  Diet Orders (active) (From admission, onward)       Start     Ordered    10/14/23 1800  Adult Cyclic 2-in-1 TPN  Cyclic TPN - See Admin Instructions        Note to Pharmacy: Iqra Britt  : 12/15/1960  CSN: [id number]  5G - S560    10/14/23 1405    10/14/23 1800  Fat Emul Fish Oil/Plant Based (SMOFLIPID) 20 % emulsion 250 mL  Every 24 Hours Scheduled (TPN)         10/14/23 1405    10/14/23 1239  NPO Diet NPO Type: Strict NPO  Diet Effective Now         10/14/23 1240                    Current TPN Regimen Recommendation:  Dextrose 9% / Amino Acid 4.8% starting at a rate of 25 mL/hr, increasing by 20 mL/hr q8h to goal rate of 60 mL/hr.  20% SmofLipid 250mL every 24 hours.    Assessment/Plan:  Pharmacy to dose TPN ordered for bowel obstruction. TPN to be started 10/14.  Initial macronutrient recommendations per RD. 9%D, 4.8%AA, starting at a rate of 25 mL/hr, increasing by 20 mL/hr q8h to goal rate of 60 mL/hr. Will order standard electrolyte concentrations to start.  Levemir 5 units nightly and moderate-high SSI already ordered. Patient has T2DM, A1C of 8.8%.  Electrolyte replacements already ordered exogenously. Potassium, magnesium, calcium, and phosphorus replacements available.  Pharmacy will continue to follow and adjust TPN as appropriate.    Popeye Reyna, PharmD, BCPS  10/14/2023  14:10 EDT

## 2023-10-14 NOTE — PLAN OF CARE
Problem: Adult Inpatient Plan of Care  Goal: Plan of Care Review  Outcome: Ongoing, Progressing  Goal: Patient-Specific Goal (Individualized)  Outcome: Ongoing, Progressing  Goal: Absence of Hospital-Acquired Illness or Injury  Outcome: Ongoing, Progressing  Intervention: Identify and Manage Fall Risk  Recent Flowsheet Documentation  Taken 10/14/2023 1600 by Sherri Kaur RN  Safety Promotion/Fall Prevention:   activity supervised   assistive device/personal items within reach   clutter free environment maintained   toileting scheduled   safety round/check completed   room organization consistent  Taken 10/14/2023 1418 by Sherri Kaur RN  Safety Promotion/Fall Prevention: (XRAY) patient off unit  Taken 10/14/2023 1200 by Sherri Kaur RN  Safety Promotion/Fall Prevention:   activity supervised   assistive device/personal items within reach   clutter free environment maintained   toileting scheduled   safety round/check completed   room organization consistent  Taken 10/14/2023 1000 by Sherri Kaur RN  Safety Promotion/Fall Prevention:   activity supervised   assistive device/personal items within reach   clutter free environment maintained   toileting scheduled   safety round/check completed   room organization consistent  Taken 10/14/2023 0800 by Sherri Kaur RN  Safety Promotion/Fall Prevention:   activity supervised   assistive device/personal items within reach   clutter free environment maintained   toileting scheduled   safety round/check completed   room organization consistent  Intervention: Prevent Skin Injury  Recent Flowsheet Documentation  Taken 10/14/2023 1600 by Sherri Kaur RN  Body Position: position changed independently  Skin Protection:   adhesive use limited   tubing/devices free from skin contact  Taken 10/14/2023 1200 by Sherri Kaur RN  Body Position: position changed independently  Skin Protection:   adhesive use limited   tubing/devices free  from skin contact  Taken 10/14/2023 1000 by Sherri Kaur RN  Body Position: position changed independently  Skin Protection:   adhesive use limited   tubing/devices free from skin contact  Taken 10/14/2023 0800 by Sherri Kaur RN  Body Position: position changed independently  Skin Protection:   adhesive use limited   tubing/devices free from skin contact  Intervention: Prevent and Manage VTE (Venous Thromboembolism) Risk  Recent Flowsheet Documentation  Taken 10/14/2023 1600 by Sherri Kaur RN  Activity Management: back to bed  Taken 10/14/2023 1200 by Sherri Kaur RN  Activity Management:   up in chair   up to bedside commode  Taken 10/14/2023 1000 by Sherri Kaur RN  Activity Management: up in chair  Taken 10/14/2023 0800 by Sherri Kaur RN  Activity Management: activity encouraged  Intervention: Prevent Infection  Recent Flowsheet Documentation  Taken 10/14/2023 1600 by Sherri Kaur RN  Infection Prevention: single patient room provided  Taken 10/14/2023 1200 by Sherri Kaur RN  Infection Prevention: single patient room provided  Taken 10/14/2023 1000 by Sherri Kaur RN  Infection Prevention: single patient room provided  Taken 10/14/2023 0800 by Sherri Kaur RN  Infection Prevention: single patient room provided  Goal: Optimal Comfort and Wellbeing  Outcome: Ongoing, Progressing  Intervention: Provide Person-Centered Care  Recent Flowsheet Documentation  Taken 10/14/2023 1600 by Sherri Kaur RN  Trust Relationship/Rapport:   care explained   choices provided   emotional support provided   empathic listening provided   questions answered   questions encouraged   reassurance provided   thoughts/feelings acknowledged  Taken 10/14/2023 1200 by Sherri Kaur RN  Trust Relationship/Rapport:   care explained   choices provided   emotional support provided   empathic listening provided   questions answered   questions encouraged    reassurance provided   thoughts/feelings acknowledged  Taken 10/14/2023 1000 by Sherri Kaur RN  Trust Relationship/Rapport:   care explained   choices provided   emotional support provided   empathic listening provided   questions answered   questions encouraged   reassurance provided   thoughts/feelings acknowledged  Taken 10/14/2023 0800 by Sherri Kaur RN  Trust Relationship/Rapport:   care explained   choices provided   emotional support provided   empathic listening provided   questions answered   reassurance provided   questions encouraged   thoughts/feelings acknowledged  Goal: Readiness for Transition of Care  Outcome: Ongoing, Progressing     Problem: Skin Injury Risk Increased  Goal: Skin Health and Integrity  Outcome: Ongoing, Progressing  Intervention: Optimize Skin Protection  Recent Flowsheet Documentation  Taken 10/14/2023 1600 by Sherri Kaur RN  Pressure Reduction Techniques:   frequent weight shift encouraged   heels elevated off bed   positioned off wounds  Head of Bed (HOB) Positioning: HOB at 20-30 degrees  Pressure Reduction Devices:   positioning supports utilized   heel offloading device utilized   pressure-redistributing mattress utilized  Skin Protection:   adhesive use limited   tubing/devices free from skin contact  Taken 10/14/2023 1200 by Sherri Kaur RN  Pressure Reduction Techniques:   frequent weight shift encouraged   heels elevated off bed   positioned off wounds  Head of Bed (HOB) Positioning: HOB at 20-30 degrees  Pressure Reduction Devices:   pressure-redistributing mattress utilized   positioning supports utilized   heel offloading device utilized  Skin Protection:   adhesive use limited   tubing/devices free from skin contact  Taken 10/14/2023 1000 by Sherri Kaur RN  Pressure Reduction Techniques:   frequent weight shift encouraged   heels elevated off bed   positioned off wounds  Head of Bed (HOB) Positioning: HOB at 20-30  degrees  Pressure Reduction Devices:   positioning supports utilized   chair cushion utilized   heel offloading device utilized  Skin Protection:   adhesive use limited   tubing/devices free from skin contact  Taken 10/14/2023 0800 by Sherri Kaur RN  Pressure Reduction Techniques:   frequent weight shift encouraged   heels elevated off bed   positioned off wounds  Head of Bed (HOB) Positioning: HOB at 20-30 degrees  Pressure Reduction Devices:   pressure-redistributing mattress utilized   positioning supports utilized   heel offloading device utilized  Skin Protection:   adhesive use limited   tubing/devices free from skin contact     Problem: Pain Acute  Goal: Acceptable Pain Control and Functional Ability  Outcome: Ongoing, Progressing  Intervention: Prevent or Manage Pain  Recent Flowsheet Documentation  Taken 10/14/2023 1600 by Sherri Kaur RN  Medication Review/Management: medications reviewed  Taken 10/14/2023 1200 by Sherri Kaur RN  Medication Review/Management: medications reviewed  Taken 10/14/2023 1000 by Sherri Kaur RN  Medication Review/Management: medications reviewed  Taken 10/14/2023 0800 by Sherri Kaur RN  Medication Review/Management: medications reviewed  Intervention: Optimize Psychosocial Wellbeing  Recent Flowsheet Documentation  Taken 10/14/2023 1600 by Sherri Kaur RN  Diversional Activities: television  Taken 10/14/2023 1200 by Sherri Kaur RN  Diversional Activities: television  Taken 10/14/2023 1000 by Sherri Kaur RN  Diversional Activities: television  Taken 10/14/2023 0800 by Sherri Kaur RN  Diversional Activities: television     Problem: Fall Injury Risk  Goal: Absence of Fall and Fall-Related Injury  Outcome: Ongoing, Progressing  Intervention: Identify and Manage Contributors  Recent Flowsheet Documentation  Taken 10/14/2023 1600 by Sherri Kaur RN  Medication Review/Management: medications reviewed  Taken  10/14/2023 1200 by Sherri Kaur RN  Medication Review/Management: medications reviewed  Taken 10/14/2023 1000 by Sherri Kaur RN  Medication Review/Management: medications reviewed  Taken 10/14/2023 0800 by Sherri Kaur RN  Medication Review/Management: medications reviewed  Intervention: Promote Injury-Free Environment  Recent Flowsheet Documentation  Taken 10/14/2023 1600 by Sherri Kaur RN  Safety Promotion/Fall Prevention:   activity supervised   assistive device/personal items within reach   clutter free environment maintained   toileting scheduled   safety round/check completed   room organization consistent  Taken 10/14/2023 1418 by Sherri Kaur RN  Safety Promotion/Fall Prevention: (XRAY) patient off unit  Taken 10/14/2023 1200 by Sherri Kaur RN  Safety Promotion/Fall Prevention:   activity supervised   assistive device/personal items within reach   clutter free environment maintained   toileting scheduled   safety round/check completed   room organization consistent  Taken 10/14/2023 1000 by Sherri Kaur RN  Safety Promotion/Fall Prevention:   activity supervised   assistive device/personal items within reach   clutter free environment maintained   toileting scheduled   safety round/check completed   room organization consistent  Taken 10/14/2023 0800 by Sherri Kaur RN  Safety Promotion/Fall Prevention:   activity supervised   assistive device/personal items within reach   clutter free environment maintained   toileting scheduled   safety round/check completed   room organization consistent   Goal Outcome Evaluation:

## 2023-10-14 NOTE — SIGNIFICANT NOTE
10/14/23 1007   SLP Deferred Reason   SLP Deferred Reason   (Pt remains NPO for GI purposes. Will defer SLP/dysphagia evaluation until cleared for PO intake.)

## 2023-10-14 NOTE — PROGRESS NOTES
Gynecologic Oncology   Daily Progress Note    Chief Complaint: N/V, bowel obstruction, pelvic mass, presumed malignancy    Subjective     Patient continues to have bowel obstruction that does not seem to be improving with medical management.  She only has flatus or bowel movements with enemas.  She continues to have abdominal distention that waxes and wanes, but this is not dramatically improved since NG insertion with admission.    Inpatient Medications:     Current Facility-Administered Medications:     acetaminophen (TYLENOL) tablet 650 mg, 650 mg, Nasogastric, Q4H PRN, Charito Amaro MD    Calcium Replacement - Follow Nurse / BPA Driven Protocol, , Does not apply, PRN, Charito Amaro MD    dextrose (D50W) (25 g/50 mL) IV injection 25 g, 25 g, Intravenous, Q15 Min PRN, Judy Lujan DO    dextrose (D50W) (25 g/50 mL) IV injection 25 g, 25 g, Intravenous, Q15 Min PRN, Dana Draper MD    dextrose (GLUTOSE) oral gel 15 g, 15 g, Nasogastric, Q15 Min PRN, Charito Amaro MD    dextrose (GLUTOSE) oral gel 15 g, 15 g, Oral, Q15 Min PRN, Dana Draper MD    donepezil (ARICEPT) tablet 5 mg, 5 mg, Nasogastric, Nightly, Charito Amaro MD, 5 mg at 10/13/23 2037    famotidine (PEPCID) injection 20 mg, 20 mg, Intravenous, Daily, Charito Amaro MD, 20 mg at 10/14/23 0908    Fleets enema 1 enema, 1 enema, Rectal, Daily, Dana Draper MD, 1 enema at 10/14/23 0936    glucagon (GLUCAGEN) injection 1 mg, 1 mg, Intramuscular, Q15 Min PRN, Judy Lujan DO    heparin (porcine) 5000 UNIT/ML injection 5,000 Units, 5,000 Units, Subcutaneous, Q8H, Judy Lujan DO, 5,000 Units at 10/14/23 0613    hydrALAZINE (APRESOLINE) injection 10 mg, 10 mg, Intravenous, Q6H PRN, Judy Lujan DO    insulin detemir (LEVEMIR) injection 5 Units, 5 Units, Subcutaneous, Nightly, Charito Amaro MD    insulin regular (humuLIN R,novoLIN R) injection 3-14 Units, 3-14 Units, Subcutaneous, Q6H, Dana Draper,  MD    ipratropium-albuterol (DUO-NEB) nebulizer solution 3 mL, 3 mL, Nebulization, Q4H PRN, Judy Lujan DO    ivabradine HCl (CORLANOR) tablet 7.5 mg, 7.5 mg, Nasogastric, BID With Meals, Charito Amaro MD, 7.5 mg at 10/14/23 0908    LORazepam (ATIVAN) injection 0.25 mg, 0.25 mg, Intravenous, Q6H, Dana Draper MD    Magnesium Standard Dose Replacement - Follow Nurse / BPA Driven Protocol, , Does not apply, PRN, Charito Amaro MD    morphine injection 2 mg, 2 mg, Intravenous, Q4H PRN, Judy Lujan DO, 2 mg at 10/12/23 2158    ondansetron (ZOFRAN) injection 4 mg, 4 mg, Intravenous, Q6H PRN, Judy Lujan DO, 4 mg at 10/11/23 2323    phenol (CHLORASEPTIC) 1.4 % liquid 1 spray, 1 spray, Mouth/Throat, Q2H PRN, Dana Draper MD    Phosphorus Replacement - Follow Nurse / BPA Driven Protocol, , Does not apply, PRN, Charito Amaro MD    Potassium Replacement - Follow Nurse / BPA Driven Protocol, , Does not apply, Prem ZENDEJAS Mayuri V, MD    sacubitril-valsartan (ENTRESTO) 24-26 MG tablet 1 tablet, 1 tablet, Nasogastric, BID, Charito Amaro MD, 1 tablet at 10/14/23 0908    sertraline (ZOLOFT) tablet 75 mg, 75 mg, Nasogastric, Daily, Charito Amaro MD, 75 mg at 10/14/23 0908    [COMPLETED] Insert Peripheral IV, , , Once **AND** sodium chloride 0.9 % flush 10 mL, 10 mL, Intravenous, PRN, Judy Lujan,     sodium chloride 0.9 % flush 10 mL, 10 mL, Intravenous, Q12H, Judy Lujan DO, 10 mL at 10/14/23 0909    sodium chloride 0.9 % flush 10 mL, 10 mL, Intravenous, PRN, Le, Judy R, DO    sodium chloride 0.9 % infusion 40 mL, 40 mL, Intravenous, PRN, Judy Lujan R, DO    sodium chloride 0.9 % infusion, 50 mL/hr, Intravenous, Continuous, Dana Draper MD, Last Rate: 50 mL/hr at 10/13/23 1747, 50 mL/hr at 10/13/23 1747    spironolactone (ALDACTONE) tablet 25 mg, 25 mg, Nasogastric, Daily, Charito Amaro MD, 25 mg at 10/14/23 0908     Objective   Temp:  [97.3 °F  (36.3 °C)-98.7 °F (37.1 °C)] 97.3 °F (36.3 °C)  Heart Rate:  [100-122] 100  Resp:  [16-18] 18  BP: (104-133)/(59-73) 118/66  Vitals:    10/14/23 1157   BP: 118/66   Pulse:    Resp: 18   Temp:    SpO2:      I/O last 3 completed shifts:  In: 1200 [I.V.:1200]  Out: 600 [Urine:550; Emesis/NG output:50]     GENERAL: Alert, well-appearing female in no apparent distress.    CARDIOVASCULAR: Normal rate, regular rhythm, no murmurs, rubs, or gallops.    RESPIRATORY: Expiratory wheezes bilateral lower lobes, normal respiratory effort  GASTROINTESTINAL: Distended, soft/not rigid, no bowel sounds  GENITOURINARY: Bear in place/bear previously removed.    SKIN:  Warm, dry, well-perfused.    PSYCHIATRIC: AO x3, with appropriate affect, normal thought processes  EXREMITIES: Symmetric. No peripheral edema.    Lab Results   Component Value Date    WBC 10.79 10/14/2023    HGB 9.8 (L) 10/14/2023    HCT 31.7 (L) 10/14/2023    MCV 83.6 10/14/2023     10/14/2023    NEUTROABS 8.67 (H) 10/12/2023    GLUCOSE 120 (H) 10/14/2023    BUN 37 (H) 10/14/2023    CREATININE 0.94 10/14/2023    EGFRIFNONA 34 (L) 05/02/2022    EGFRIFAFRI 41 (L) 05/02/2022     10/14/2023    K 3.3 (L) 10/14/2023     (H) 10/14/2023    CO2 21.0 (L) 10/14/2023    CALCIUM 8.0 (L) 10/14/2023         Assessment & Plan   Iqra Britt is a 72 y.o. female with small bowel obstruction, pelvic mass    1.  Small bowel obstruction, pelvic mass, ascites  -Cytology pending from paracentesis  -Clinical findings consistent with gynecologic malignancy, favor ovarian cancer  -Despite NG tube placement 10/11/2023 and management with daily enemas, patient continues to have evidence of small bowel obstruction  -Acute abdominal series ordered for repeat evaluation  -Plan for surgery tomorrow with exploratory laparotomy, ostomy, tumor biopsy  Advised patient and family that there is a possibility of intestinal resection with reanastomosis, but I am hesitant to do  this given her poor nutritional status.  We talked about the possibility of multiple areas of the small bowel being compromised with multiple areas of obstruction as this can be seen in ovarian malignancies.  It is unclear if patient has ovarian cancer or not, but the clinical findings are very concerning.  Risks of surgery were discussed.  High likelihood of ICU stay post operatively discussed.  Although would be nice to perform a tumor debulking at the time of this surgical procedure, my main goal is to address patient's bowel obstruction.  We discussed the possibility of PEG tube insertion as well depending on the findings at the time of surgery.  Patient and family understand that she is at high risk for adverse outcomes given her complicated clinical course, nutritional compromise, and cardiac status.  -Type and screen ordered, heparin held after this afternoon  -Life threatening aspects of care for reviewed and understood.    2.  Malnutrition  -PICC, TPN ordered  -Risk of TPN discussed with patient and family including liver dysfunction, pancreas dysfunction, hyperglycemia    3.  Significant comorbidities  -FIORDALIZA in the context of CKD stage III; normal creatinine today  -CHF, seen by cardiology.  Moderate risk for surgical intervention per family's conversation this morning.  -Type 2 diabetes mellitus  -COPD    4.  Disposition  -To the OR tomorrow for exploratory laparotomy, ostomy, tumor biopsy, any other additional necessary procedures         Dana Draper MD  10/14/23  12:57 EDT

## 2023-10-14 NOTE — PROGRESS NOTES
[facility] Medicine Services  PROGRESS NOTE    Patient Name: Iqra Britt  : 1950  MRN: [id number]    Date of Admission: 10/11/2023  Primary Care Physician: Georgie Fernández,     Subjective   Subjective     CC:  F/U abdominal distension    HPI:  Seen this morning. No change from yesterday. Receiving meds via NG tube. HR improved today.      Objective   Objective     Vital Signs:   Temp:  [97.3 °F (36.3 °C)-98.7 °F (37.1 °C)] 97.3 °F (36.3 °C)  Heart Rate:  [100-122] 100  Resp:  [16-18] 18  BP: (104-133)/(59-73) 118/66     Physical Exam:  Gen-no acute distress  HENT-NCAT, mucous membranes moist  CV-tachycardic, S1 S2 normal, no m/r/g  Resp-CTAB, no wheezes or rales  Abd-distended, NT, hypoactive BS  Ext-no edema  Neuro-A&Ox3, no focal deficits  Skin-no rashes  Psych-appropriate mood      Results Reviewed:  LAB RESULTS:      Lab 10/14/23  0430 10/12/23  0416 10/11/23  1340   WBC 10.79 9.53 14.40*   HEMOGLOBIN 9.8* 10.8* 12.0   HEMATOCRIT 31.7* 34.2 38.9   PLATELETS 358 450 585*   NEUTROS ABS  --  8.67* 13.15*   IMMATURE GRANS (ABS)  --   --  0.13*   LYMPHS ABS  --   --  0.25*   MONOS ABS  --   --  0.84   EOS ABS  --  0.00 0.00   MCV 83.6 81.6 82.4   PROTIME  --   --  16.9*         Lab 10/14/23  0430 10/12/23  0416 10/11/23  1354 10/11/23  1347 10/11/23  1340   SODIUM 144 130*  --   --  133*   POTASSIUM 3.3* 4.0  --   --  4.5   CHLORIDE 108* 95*  --   --  91*   CO2 21.0* 23.0  --   --  22.0   ANION GAP 15.0 12.0  --   --  20.0*   BUN 37* 58*  --   --  58*   CREATININE 0.94 1.52* 2.10 2.10* 1.72*   EGFR 64.6 36.3*  --   --  31.3*   GLUCOSE 120* 285*  --   --  358*   CALCIUM 8.0* 7.9*  --   --  8.5*   HEMOGLOBIN A1C  --  8.80*  --   --   --          Lab 10/12/23  0416 10/11/23  1340   TOTAL PROTEIN 6.1 7.2   ALBUMIN 2.5* 3.3*   GLOBULIN 3.6 3.9   ALT (SGPT) 10 13   AST (SGOT) 30 31   BILIRUBIN 0.4 0.4   ALK PHOS 46 56   LIPASE  --  7*         Lab 10/11/23  1340   PROBNP  1,889.0*   HSTROP T 28*   PROTIME 16.9*   INR 1.36*                 Brief Urine Lab Results  (Last result in the past 365 days)        Color   Clarity   Blood   Leuk Est   Nitrite   Protein   CREAT   Urine HCG        10/12/23 0425 Yellow   Cloudy   Moderate (2+)   Small (1+)   Negative   30 mg/dL (1+)                   Microbiology Results Abnormal       Procedure Component Value - Date/Time    Body Fluid Culture - Body Fluid, Peritoneum [[id number]] Collected: 10/12/23 1139    Lab Status: Preliminary result Specimen: Body Fluid from Peritoneum Updated: 10/14/23 0930     Body Fluid Culture No growth at 2 days     Gram Stain Moderate (3+) WBCs per low power field      No organisms seen    Fungus Smear - Peritoneal Fluid, Peritoneum [[id number]] Collected: 10/12/23 1139    Lab Status: Final result Specimen: Peritoneal Fluid from Peritoneum Updated: 10/13/23 1338     Fungal Stain No fungal elements seen            XR Abdomen KUB    Result Date: 10/13/2023  XR ABDOMEN KUB Date of Exam: 10/13/2023 4:32 AM EDT Indication: replacement of NG tube Comparison: 10/13/2023 at 0252 hours. Findings: Gastric suction tube is repositioned with the tip in the mid stomach. There are distended bowel loops in the upper abdomen. There is mild bibasilar atelectasis. No pneumoperitoneum.     Impression: Impression: Gastric suction tube tip is in the mid stomach. Electronically Signed: Faraz Roberts MD  10/13/2023 4:54 AM EDT  Workstation ID: [id number]    XR Abdomen KUB    Result Date: 10/13/2023  XR ABDOMEN KUB Date of Exam: 10/13/2023 3:09 AM EDT Indication: NG placement Comparison: 10/11/2023 and CT abdomen and pelvis same date. Findings: Gastric suction tube is folded in the distal esophagus. There are distended bowel loops in the abdomen measuring up to 41 mm consistent with ongoing obstruction. No evidence of pneumoperitoneum.     Impression: Impression: Gastric suction tube is folded in the distal esophagus. Electronically Signed:  Faraz Roberts MD  10/13/2023 3:50 AM EDT  Workstation ID: [id number]         Current medications:  Scheduled Meds:donepezil, 5 mg, Nasogastric, Nightly  famotidine, 20 mg, Intravenous, Daily  Fleets, 1 enema, Rectal, Daily  heparin (porcine), 5,000 Units, Subcutaneous, Q8H  insulin detemir, 5 Units, Subcutaneous, Nightly  insulin regular, 3-14 Units, Subcutaneous, Q6H  ivabradine HCl, 7.5 mg, Nasogastric, BID With Meals  LORazepam, 0.25 mg, Intravenous, Q6H  sacubitril-valsartan, 1 tablet, Nasogastric, BID  sertraline, 75 mg, Nasogastric, Daily  sodium chloride, 10 mL, Intravenous, Q12H  spironolactone, 25 mg, Nasogastric, Daily      Continuous Infusions:sodium chloride, 50 mL/hr, Last Rate: 50 mL/hr (10/13/23 1747)      PRN Meds:.  acetaminophen    Calcium Replacement - Follow Nurse / BPA Driven Protocol    dextrose    dextrose    dextrose    dextrose    glucagon (human recombinant)    hydrALAZINE    ipratropium-albuterol    Magnesium Standard Dose Replacement - Follow Nurse / BPA Driven Protocol    Morphine    ondansetron    phenol    Phosphorus Replacement - Follow Nurse / BPA Driven Protocol    Potassium Replacement - Follow Nurse / BPA Driven Protocol    [COMPLETED] Insert Peripheral IV **AND** sodium chloride    sodium chloride    sodium chloride    Assessment & Plan   Assessment & Plan     Active Hospital Problems    Diagnosis  POA    **SBO (small bowel obstruction) [K56.609]  Yes    Ovarian mass [N83.8]  Yes    COPD (chronic obstructive pulmonary disease) [J44.9]  Yes    Chronic HFrEF (heart failure with reduced ejection fraction) [I50.22]  Yes    CKD (chronic kidney disease), stage III [N18.30]  Yes    Type 2 diabetes mellitus [E11.9]  Yes      Resolved Hospital Problems   No resolved problems to display.        Brief Hospital Course to date:  Iqra Britt is a 72 y.o. female with PMH significant for HFrEF/NICM s/p BiV ICD (LVEF 26%), HTN, CKDIII, Arthritis, DMII, and COPD who presents to [facility] with  complaints of abdominal distention and pain along with nausea/vomiting.    This patient's problems and plans were partially entered by my partner and updated as appropriate by me 10/14/23. Copied text in this note has been reviewed and is accurate as of today's date.      SBO  --CT A/P showing small bowel with transition point in right lower abdomen near a large central abdominopelvic mass likely arising from the right ovary  --Dr. Draper following, continue NG tube; okay to give PO meds and clamp NG afterward per Dr. Draper  --NPO, gentle IVF for now (given hx of CHF)  --PRN pain control  --Given lack of improvement despite bowel rest, fluids, enemas, Dr. Draper now plans for OR tomorrow for ex-lap, ostomy, tumor biopsy, etc. Will also order PICC line and TPN for nutrition.     Large pelvic mass with ascites, with concern for carcinomatosis and retroperitoneal lymphadenopathy  --CA 19-9, CEA, CA-125 all significantly elevated per review of OSH records, repeat CA-125 on admission elevated at 776  --CT A/P showing small bowel with transition point in right lower abdomen near a large central abdominopelvic mass likely arising from the right ovary, small to moderate volume ascites with features concerning for carcinomatosis, retroperitoneal lymphadenopathy likely metastatic   --Concern for primary ovarian malignancy with malignant ascites  --Dr. Draper following, continuing to discuss options with family  --IR did not see a significant amount of ascites, were able to remove 0.7 L from a small pocket of fluid in her RUQ on 10/12/23 and sent for studies including cytology - pending  --Recent negative endometrial biopsy 5/2023 and recent D&C 10/9/23 with pathology showing benign polyp  --Given her comorbidities, she is not a great surgical candidate for surgical debulking    Hypokalemia  --Replace per protocol     CKDIII  --Baseline Cr appears to be around 1.5  --Avoid nephrotoxins, holding Lasix for  now  --Improved from baseline today at 0.94     HFrEF/NICM s/p BiV ICD (LVEF 26% 9/2023)  --Followed by Cardiology at   --Will hold Lasix for now  --Resumed Entresto, Ivabradine, Spironolactone 10/13/23   --Monitor for volume overload  --Cardiology following     Abnormal UA  --Sample contaminated with significant squamous cells, repeat UA unremarkable for acute infection     COPD  --Not in exacerbation  --CXR negative on admission  --On room air  --PRN Duo-nebs     DM2, uncontrolled  --HbA1c 8.8%  --SSI coverage while NPO  --Decreased Levemir to 5 units daily as glucose running on lower side now     Await cytology from paracentesis. Per son, Cardiology told them patient was acceptable/moderate risk for surgery if required.     Dr. Draper now plans for OR tomorrow.     Expected Discharge Location and Transportation: home  Expected Discharge   Expected Discharge Date: 10/18/2023; Expected Discharge Time:      DVT prophylaxis:  Medical DVT prophylaxis orders are present.     AM-PAC 6 Clicks Score (PT): 18 (10/14/23 0800)    CODE STATUS:   Code Status and Medical Interventions:   Ordered at: 10/11/23 1858     Level Of Support Discussed With:    Patient     Code Status (Patient has no pulse and is not breathing):    CPR (Attempt to Resuscitate)     Medical Interventions (Patient has pulse or is breathing):    Full Support       Charito Amaro MD  10/14/23

## 2023-10-15 ENCOUNTER — ANESTHESIA EVENT (OUTPATIENT)
Dept: PERIOP | Facility: HOSPITAL | Age: 73
End: 2023-10-15
Payer: MEDICARE

## 2023-10-15 ENCOUNTER — ANESTHESIA EVENT CONVERTED (OUTPATIENT)
Dept: ANESTHESIOLOGY | Facility: HOSPITAL | Age: 73
DRG: 737 | End: 2023-10-15
Payer: MEDICARE

## 2023-10-15 ENCOUNTER — ANESTHESIA (OUTPATIENT)
Dept: PERIOP | Facility: HOSPITAL | Age: 73
End: 2023-10-15
Payer: MEDICARE

## 2023-10-15 LAB
ANION GAP SERPL CALCULATED.3IONS-SCNC: 8 MMOL/L (ref 5–15)
BUN SERPL-MCNC: 32 MG/DL (ref 8–23)
BUN/CREAT SERPL: 34.8 (ref 7–25)
CALCIUM SPEC-SCNC: 8.3 MG/DL (ref 8.6–10.5)
CHLORIDE SERPL-SCNC: 111 MMOL/L (ref 98–107)
CO2 SERPL-SCNC: 25 MMOL/L (ref 22–29)
CREAT SERPL-MCNC: 0.92 MG/DL (ref 0.57–1)
DEPRECATED RDW RBC AUTO: 52.9 FL (ref 37–54)
EGFRCR SERPLBLD CKD-EPI 2021: 66.3 ML/MIN/1.73
ERYTHROCYTE [DISTWIDTH] IN BLOOD BY AUTOMATED COUNT: 16.7 % (ref 12.3–15.4)
GLUCOSE BLDC GLUCOMTR-MCNC: 203 MG/DL (ref 70–130)
GLUCOSE BLDC GLUCOMTR-MCNC: 218 MG/DL (ref 70–130)
GLUCOSE BLDC GLUCOMTR-MCNC: 249 MG/DL (ref 70–130)
GLUCOSE BLDC GLUCOMTR-MCNC: 298 MG/DL (ref 70–130)
GLUCOSE BLDC GLUCOMTR-MCNC: 353 MG/DL (ref 70–130)
GLUCOSE SERPL-MCNC: 217 MG/DL (ref 65–99)
HCT VFR BLD AUTO: 29 % (ref 34–46.6)
HCT VFR BLD AUTO: 33.3 % (ref 34–46.6)
HGB BLD-MCNC: 8.7 G/DL (ref 12–15.9)
HGB BLD-MCNC: 9.8 G/DL (ref 12–15.9)
MAGNESIUM SERPL-MCNC: 2.4 MG/DL (ref 1.6–2.4)
MCH RBC QN AUTO: 25.8 PG (ref 26.6–33)
MCHC RBC AUTO-ENTMCNC: 29.4 G/DL (ref 31.5–35.7)
MCV RBC AUTO: 87.6 FL (ref 79–97)
PHOSPHATE SERPL-MCNC: 2.5 MG/DL (ref 2.5–4.5)
PLATELET # BLD AUTO: 332 10*3/MM3 (ref 140–450)
PMV BLD AUTO: 10.6 FL (ref 6–12)
POTASSIUM SERPL-SCNC: 3.8 MMOL/L (ref 3.5–5.2)
POTASSIUM SERPL-SCNC: 4.4 MMOL/L (ref 3.5–5.2)
RBC # BLD AUTO: 3.8 10*6/MM3 (ref 3.77–5.28)
SODIUM SERPL-SCNC: 144 MMOL/L (ref 136–145)
WBC NRBC COR # BLD: 9.65 10*3/MM3 (ref 3.4–10.8)

## 2023-10-15 PROCEDURE — 58720 REMOVAL OF OVARY/TUBE(S): CPT | Performed by: OBSTETRICS & GYNECOLOGY

## 2023-10-15 PROCEDURE — 0UT50ZZ RESECTION OF RIGHT FALLOPIAN TUBE, OPEN APPROACH: ICD-10-PCS | Performed by: OBSTETRICS & GYNECOLOGY

## 2023-10-15 PROCEDURE — 84132 ASSAY OF SERUM POTASSIUM: CPT | Performed by: INTERNAL MEDICINE

## 2023-10-15 PROCEDURE — 25010000002 PHENYLEPHRINE 10 MG/ML SOLUTION 5 ML VIAL

## 2023-10-15 PROCEDURE — 25010000002 HEPARIN (PORCINE) PER 1000 UNITS: Performed by: OBSTETRICS & GYNECOLOGY

## 2023-10-15 PROCEDURE — 25010000002 DEXAMETHASONE PER 1 MG: Performed by: NURSE ANESTHETIST, CERTIFIED REGISTERED

## 2023-10-15 PROCEDURE — 63710000001 INSULIN REGULAR HUMAN PER 5 UNITS: Performed by: OBSTETRICS & GYNECOLOGY

## 2023-10-15 PROCEDURE — 25010000002 MAGNESIUM SULFATE PER 500 MG OF MAGNESIUM

## 2023-10-15 PROCEDURE — 25010000002 THIAMINE PER 100 MG

## 2023-10-15 PROCEDURE — 25010000002 ERTAPENEM PER 500 MG: Performed by: OBSTETRICS & GYNECOLOGY

## 2023-10-15 PROCEDURE — 25010000002 HYDROMORPHONE PER 4 MG: Performed by: OBSTETRICS & GYNECOLOGY

## 2023-10-15 PROCEDURE — 82947 ASSAY GLUCOSE BLOOD QUANT: CPT

## 2023-10-15 PROCEDURE — 85027 COMPLETE CBC AUTOMATED: CPT | Performed by: HOSPITALIST

## 2023-10-15 PROCEDURE — 84100 ASSAY OF PHOSPHORUS: CPT | Performed by: OBSTETRICS & GYNECOLOGY

## 2023-10-15 PROCEDURE — 25010000002 FENTANYL CITRATE (PF) 100 MCG/2ML SOLUTION: Performed by: NURSE ANESTHETIST, CERTIFIED REGISTERED

## 2023-10-15 PROCEDURE — 63710000001 INSULIN DETEMIR PER 5 UNITS: Performed by: OBSTETRICS & GYNECOLOGY

## 2023-10-15 PROCEDURE — 25010000002 PHENYLEPHRINE 10 MG/ML SOLUTION 1 ML VIAL: Performed by: NURSE ANESTHETIST, CERTIFIED REGISTERED

## 2023-10-15 PROCEDURE — 25010000002 ONDANSETRON PER 1 MG: Performed by: NURSE ANESTHETIST, CERTIFIED REGISTERED

## 2023-10-15 PROCEDURE — 25010000002 ALBUMIN HUMAN 5% PER 50 ML: Performed by: NURSE ANESTHETIST, CERTIFIED REGISTERED

## 2023-10-15 PROCEDURE — 94799 UNLISTED PULMONARY SVC/PX: CPT

## 2023-10-15 PROCEDURE — 83735 ASSAY OF MAGNESIUM: CPT | Performed by: OBSTETRICS & GYNECOLOGY

## 2023-10-15 PROCEDURE — 44320 COLOSTOMY: CPT | Performed by: PHYSICIAN ASSISTANT

## 2023-10-15 PROCEDURE — 25010000002 POTASSIUM CHLORIDE PER 2 MEQ OF POTASSIUM

## 2023-10-15 PROCEDURE — 25810000003 SODIUM CHLORIDE 0.9 % SOLUTION 250 ML FLEX CONT: Performed by: NURSE ANESTHETIST, CERTIFIED REGISTERED

## 2023-10-15 PROCEDURE — 88342 IMHCHEM/IMCYTCHM 1ST ANTB: CPT | Performed by: OBSTETRICS & GYNECOLOGY

## 2023-10-15 PROCEDURE — 85018 HEMOGLOBIN: CPT | Performed by: INTERNAL MEDICINE

## 2023-10-15 PROCEDURE — 25010000002 DEXAMETHASONE SODIUM PHOSPHATE 10 MG/ML SOLUTION: Performed by: NURSE ANESTHETIST, CERTIFIED REGISTERED

## 2023-10-15 PROCEDURE — 25010000002 HYDROMORPHONE PER 4 MG: Performed by: INTERNAL MEDICINE

## 2023-10-15 PROCEDURE — 25810000003 SODIUM CHLORIDE 0.9 % SOLUTION: Performed by: NURSE PRACTITIONER

## 2023-10-15 PROCEDURE — 88341 IMHCHEM/IMCYTCHM EA ADD ANTB: CPT | Performed by: OBSTETRICS & GYNECOLOGY

## 2023-10-15 PROCEDURE — 25810000003 SODIUM CHLORIDE 0.9 % SOLUTION 250 ML FLEX CONT

## 2023-10-15 PROCEDURE — 82948 REAGENT STRIP/BLOOD GLUCOSE: CPT

## 2023-10-15 PROCEDURE — 25010000002 LORAZEPAM PER 2 MG: Performed by: OBSTETRICS & GYNECOLOGY

## 2023-10-15 PROCEDURE — 99232 SBSQ HOSP IP/OBS MODERATE 35: CPT | Performed by: PHYSICIAN ASSISTANT

## 2023-10-15 PROCEDURE — 25810000003 SODIUM CHLORIDE 0.9 % SOLUTION: Performed by: NURSE ANESTHETIST, CERTIFIED REGISTERED

## 2023-10-15 PROCEDURE — 44320 COLOSTOMY: CPT | Performed by: OBSTETRICS & GYNECOLOGY

## 2023-10-15 PROCEDURE — 25010000002 PHENYLEPHRINE 10 MG/ML SOLUTION

## 2023-10-15 PROCEDURE — 84132 ASSAY OF SERUM POTASSIUM: CPT

## 2023-10-15 PROCEDURE — 25010000002 BUPIVACAINE (PF) 0.25 % SOLUTION: Performed by: NURSE ANESTHETIST, CERTIFIED REGISTERED

## 2023-10-15 PROCEDURE — 82330 ASSAY OF CALCIUM: CPT

## 2023-10-15 PROCEDURE — 80048 BASIC METABOLIC PNL TOTAL CA: CPT | Performed by: OBSTETRICS & GYNECOLOGY

## 2023-10-15 PROCEDURE — 0D1M0Z4 BYPASS DESCENDING COLON TO CUTANEOUS, OPEN APPROACH: ICD-10-PCS | Performed by: OBSTETRICS & GYNECOLOGY

## 2023-10-15 PROCEDURE — 25010000002 SUGAMMADEX 200 MG/2ML SOLUTION: Performed by: NURSE ANESTHETIST, CERTIFIED REGISTERED

## 2023-10-15 PROCEDURE — 25010000002 CALCIUM GLUCONATE PER 10 ML

## 2023-10-15 PROCEDURE — P9041 ALBUMIN (HUMAN),5%, 50ML: HCPCS | Performed by: NURSE ANESTHETIST, CERTIFIED REGISTERED

## 2023-10-15 PROCEDURE — 85014 HEMATOCRIT: CPT | Performed by: INTERNAL MEDICINE

## 2023-10-15 PROCEDURE — 82803 BLOOD GASES ANY COMBINATION: CPT

## 2023-10-15 PROCEDURE — 99291 CRITICAL CARE FIRST HOUR: CPT | Performed by: INTERNAL MEDICINE

## 2023-10-15 PROCEDURE — 0UT00ZZ RESECTION OF RIGHT OVARY, OPEN APPROACH: ICD-10-PCS | Performed by: OBSTETRICS & GYNECOLOGY

## 2023-10-15 PROCEDURE — 88360 TUMOR IMMUNOHISTOCHEM/MANUAL: CPT | Performed by: OBSTETRICS & GYNECOLOGY

## 2023-10-15 PROCEDURE — 88307 TISSUE EXAM BY PATHOLOGIST: CPT | Performed by: OBSTETRICS & GYNECOLOGY

## 2023-10-15 PROCEDURE — 58720 REMOVAL OF OVARY/TUBE(S): CPT | Performed by: PHYSICIAN ASSISTANT

## 2023-10-15 PROCEDURE — 85014 HEMATOCRIT: CPT

## 2023-10-15 PROCEDURE — 84295 ASSAY OF SERUM SODIUM: CPT

## 2023-10-15 PROCEDURE — 25010000002 VASOPRESSIN 20 UNIT/ML SOLUTION: Performed by: NURSE ANESTHETIST, CERTIFIED REGISTERED

## 2023-10-15 DEVICE — FLOSEAL HEMOSTATIC MATRIX, 10ML
Type: IMPLANTABLE DEVICE | Site: ABDOMEN | Status: FUNCTIONAL
Brand: FLOSEAL HEMOSTATIC MATRIX

## 2023-10-15 DEVICE — PROXIMATE RELOADABLE LINEAR CUTTER WITH SAFETY LOCK-OUT.  75MM LINEAR CUTTER.
Type: IMPLANTABLE DEVICE | Site: ABDOMEN | Status: FUNCTIONAL
Brand: PROXIMATE

## 2023-10-15 DEVICE — HEMOST ABS SURGICEL PWDR 3GM: Type: IMPLANTABLE DEVICE | Site: ABDOMEN | Status: FUNCTIONAL

## 2023-10-15 DEVICE — HEMOST ABS SURGIFOAM SZ100 8X12 10MM: Type: IMPLANTABLE DEVICE | Site: ABDOMEN | Status: FUNCTIONAL

## 2023-10-15 RX ORDER — MIDAZOLAM HYDROCHLORIDE 1 MG/ML
0.5 INJECTION INTRAMUSCULAR; INTRAVENOUS
Status: CANCELLED | OUTPATIENT
Start: 2023-10-15

## 2023-10-15 RX ORDER — HEPARIN SODIUM 5000 [USP'U]/ML
5000 INJECTION, SOLUTION INTRAVENOUS; SUBCUTANEOUS EVERY 8 HOURS SCHEDULED
Status: DISCONTINUED | OUTPATIENT
Start: 2023-10-15 | End: 2023-10-20 | Stop reason: HOSPADM

## 2023-10-15 RX ORDER — SODIUM CHLORIDE 0.9 % (FLUSH) 0.9 %
10 SYRINGE (ML) INJECTION EVERY 12 HOURS SCHEDULED
Status: CANCELLED | OUTPATIENT
Start: 2023-10-15

## 2023-10-15 RX ORDER — THIAMINE HYDROCHLORIDE 100 MG/ML
100 INJECTION, SOLUTION INTRAMUSCULAR; INTRAVENOUS DAILY
Status: DISCONTINUED | OUTPATIENT
Start: 2023-10-15 | End: 2023-10-18

## 2023-10-15 RX ORDER — DEXAMETHASONE SODIUM PHOSPHATE 10 MG/ML
INJECTION, SOLUTION INTRAMUSCULAR; INTRAVENOUS
Status: COMPLETED | OUTPATIENT
Start: 2023-10-15 | End: 2023-10-15

## 2023-10-15 RX ORDER — DEXAMETHASONE SODIUM PHOSPHATE 4 MG/ML
INJECTION, SOLUTION INTRA-ARTICULAR; INTRALESIONAL; INTRAMUSCULAR; INTRAVENOUS; SOFT TISSUE AS NEEDED
Status: DISCONTINUED | OUTPATIENT
Start: 2023-10-15 | End: 2023-10-15 | Stop reason: SURG

## 2023-10-15 RX ORDER — LORAZEPAM 2 MG/ML
0.25 INJECTION INTRAMUSCULAR EVERY 6 HOURS PRN
Status: DISCONTINUED | OUTPATIENT
Start: 2023-10-15 | End: 2023-10-18

## 2023-10-15 RX ORDER — ONDANSETRON 2 MG/ML
4 INJECTION INTRAMUSCULAR; INTRAVENOUS ONCE AS NEEDED
Status: DISCONTINUED | OUTPATIENT
Start: 2023-10-15 | End: 2023-10-15

## 2023-10-15 RX ORDER — VASOPRESSIN 20 U/ML
INJECTION PARENTERAL AS NEEDED
Status: DISCONTINUED | OUTPATIENT
Start: 2023-10-15 | End: 2023-10-15 | Stop reason: SURG

## 2023-10-15 RX ORDER — ALBUMIN, HUMAN INJ 5% 5 %
SOLUTION INTRAVENOUS CONTINUOUS PRN
Status: DISCONTINUED | OUTPATIENT
Start: 2023-10-15 | End: 2023-10-15 | Stop reason: SURG

## 2023-10-15 RX ORDER — MAGNESIUM HYDROXIDE 1200 MG/15ML
LIQUID ORAL AS NEEDED
Status: DISCONTINUED | OUTPATIENT
Start: 2023-10-15 | End: 2023-10-15 | Stop reason: HOSPADM

## 2023-10-15 RX ORDER — BUPIVACAINE HYDROCHLORIDE 2.5 MG/ML
INJECTION, SOLUTION EPIDURAL; INFILTRATION; INTRACAUDAL
Status: COMPLETED | OUTPATIENT
Start: 2023-10-15 | End: 2023-10-15

## 2023-10-15 RX ORDER — PHENYLEPHRINE HYDROCHLORIDE 10 MG/ML
INJECTION INTRAVENOUS
Status: COMPLETED
Start: 2023-10-15 | End: 2023-10-15

## 2023-10-15 RX ORDER — PHENYLEPHRINE HYDROCHLORIDE 10 MG/ML
INJECTION INTRAVENOUS AS NEEDED
Status: DISCONTINUED | OUTPATIENT
Start: 2023-10-15 | End: 2023-10-15

## 2023-10-15 RX ORDER — HYDROMORPHONE HYDROCHLORIDE 1 MG/ML
0.25 INJECTION, SOLUTION INTRAMUSCULAR; INTRAVENOUS; SUBCUTANEOUS EVERY 4 HOURS PRN
Status: DISCONTINUED | OUTPATIENT
Start: 2023-10-15 | End: 2023-10-20 | Stop reason: HOSPADM

## 2023-10-15 RX ORDER — IPRATROPIUM BROMIDE AND ALBUTEROL SULFATE 2.5; .5 MG/3ML; MG/3ML
3 SOLUTION RESPIRATORY (INHALATION)
Status: CANCELLED | OUTPATIENT
Start: 2023-10-15

## 2023-10-15 RX ORDER — SODIUM CHLORIDE 9 MG/ML
INJECTION, SOLUTION INTRAVENOUS CONTINUOUS PRN
Status: DISCONTINUED | OUTPATIENT
Start: 2023-10-15 | End: 2023-10-15 | Stop reason: SURG

## 2023-10-15 RX ORDER — HYDROMORPHONE HYDROCHLORIDE 2 MG/ML
0.25 INJECTION, SOLUTION INTRAMUSCULAR; INTRAVENOUS; SUBCUTANEOUS
Status: DISCONTINUED | OUTPATIENT
Start: 2023-10-15 | End: 2023-10-15

## 2023-10-15 RX ORDER — SODIUM CHLORIDE, SODIUM LACTATE, POTASSIUM CHLORIDE, CALCIUM CHLORIDE 600; 310; 30; 20 MG/100ML; MG/100ML; MG/100ML; MG/100ML
9 INJECTION, SOLUTION INTRAVENOUS CONTINUOUS
Status: CANCELLED | OUTPATIENT
Start: 2023-10-15

## 2023-10-15 RX ORDER — IPRATROPIUM BROMIDE AND ALBUTEROL SULFATE 2.5; .5 MG/3ML; MG/3ML
3 SOLUTION RESPIRATORY (INHALATION) ONCE AS NEEDED
Status: DISCONTINUED | OUTPATIENT
Start: 2023-10-15 | End: 2023-10-15

## 2023-10-15 RX ORDER — EPHEDRINE SULFATE 50 MG/ML
INJECTION INTRAVENOUS AS NEEDED
Status: DISCONTINUED | OUTPATIENT
Start: 2023-10-15 | End: 2023-10-15 | Stop reason: SURG

## 2023-10-15 RX ORDER — DEXTROSE MONOHYDRATE 100 MG/ML
60 INJECTION, SOLUTION INTRAVENOUS CONTINUOUS
Status: ACTIVE | OUTPATIENT
Start: 2023-10-15 | End: 2023-10-15

## 2023-10-15 RX ORDER — ROCURONIUM BROMIDE 10 MG/ML
INJECTION, SOLUTION INTRAVENOUS AS NEEDED
Status: DISCONTINUED | OUTPATIENT
Start: 2023-10-15 | End: 2023-10-15 | Stop reason: SURG

## 2023-10-15 RX ORDER — ONDANSETRON 2 MG/ML
INJECTION INTRAMUSCULAR; INTRAVENOUS AS NEEDED
Status: DISCONTINUED | OUTPATIENT
Start: 2023-10-15 | End: 2023-10-15 | Stop reason: SURG

## 2023-10-15 RX ORDER — SODIUM CHLORIDE 0.9 % (FLUSH) 0.9 %
10 SYRINGE (ML) INJECTION AS NEEDED
Status: CANCELLED | OUTPATIENT
Start: 2023-10-15

## 2023-10-15 RX ORDER — ETOMIDATE 2 MG/ML
INJECTION INTRAVENOUS AS NEEDED
Status: DISCONTINUED | OUTPATIENT
Start: 2023-10-15 | End: 2023-10-15 | Stop reason: SURG

## 2023-10-15 RX ORDER — SODIUM CHLORIDE 9 MG/ML
40 INJECTION, SOLUTION INTRAVENOUS AS NEEDED
Status: CANCELLED | OUTPATIENT
Start: 2023-10-15

## 2023-10-15 RX ORDER — HYDROMORPHONE HYDROCHLORIDE 1 MG/ML
0.5 INJECTION, SOLUTION INTRAMUSCULAR; INTRAVENOUS; SUBCUTANEOUS
Status: DISCONTINUED | OUTPATIENT
Start: 2023-10-15 | End: 2023-10-15

## 2023-10-15 RX ORDER — IPRATROPIUM BROMIDE AND ALBUTEROL SULFATE 2.5; .5 MG/3ML; MG/3ML
SOLUTION RESPIRATORY (INHALATION)
Status: COMPLETED
Start: 2023-10-15 | End: 2023-10-15

## 2023-10-15 RX ORDER — LIDOCAINE HYDROCHLORIDE 10 MG/ML
0.5 INJECTION, SOLUTION EPIDURAL; INFILTRATION; INTRACAUDAL; PERINEURAL ONCE AS NEEDED
Status: CANCELLED | OUTPATIENT
Start: 2023-10-15

## 2023-10-15 RX ORDER — FAMOTIDINE 20 MG/1
20 TABLET, FILM COATED ORAL ONCE
Status: CANCELLED | OUTPATIENT
Start: 2023-10-15 | End: 2023-10-15

## 2023-10-15 RX ORDER — SODIUM CHLORIDE 9 MG/ML
75 INJECTION, SOLUTION INTRAVENOUS CONTINUOUS
Status: DISCONTINUED | OUTPATIENT
Start: 2023-10-15 | End: 2023-10-16

## 2023-10-15 RX ORDER — FAMOTIDINE 10 MG/ML
20 INJECTION, SOLUTION INTRAVENOUS ONCE
Status: CANCELLED | OUTPATIENT
Start: 2023-10-15 | End: 2023-10-15

## 2023-10-15 RX ORDER — DOBUTAMINE HYDROCHLORIDE 100 MG/100ML
2-20 INJECTION INTRAVENOUS
Status: DISCONTINUED | OUTPATIENT
Start: 2023-10-15 | End: 2023-10-16

## 2023-10-15 RX ORDER — PHENYLEPHRINE HCL IN 0.9% NACL 1 MG/10 ML
SYRINGE (ML) INTRAVENOUS AS NEEDED
Status: DISCONTINUED | OUTPATIENT
Start: 2023-10-15 | End: 2023-10-15 | Stop reason: SURG

## 2023-10-15 RX ORDER — FENTANYL CITRATE 50 UG/ML
INJECTION, SOLUTION INTRAMUSCULAR; INTRAVENOUS AS NEEDED
Status: DISCONTINUED | OUTPATIENT
Start: 2023-10-15 | End: 2023-10-15 | Stop reason: SURG

## 2023-10-15 RX ORDER — FENTANYL CITRATE 50 UG/ML
50 INJECTION, SOLUTION INTRAMUSCULAR; INTRAVENOUS
Status: DISCONTINUED | OUTPATIENT
Start: 2023-10-15 | End: 2023-10-15

## 2023-10-15 RX ORDER — LIDOCAINE HYDROCHLORIDE 10 MG/ML
INJECTION, SOLUTION EPIDURAL; INFILTRATION; INTRACAUDAL; PERINEURAL AS NEEDED
Status: DISCONTINUED | OUTPATIENT
Start: 2023-10-15 | End: 2023-10-15 | Stop reason: SURG

## 2023-10-15 RX ADMIN — INSULIN HUMAN 5 UNITS: 100 INJECTION, SOLUTION PARENTERAL at 06:26

## 2023-10-15 RX ADMIN — POTASSIUM PHOSPHATE, MONOBASIC POTASSIUM PHOSPHATE, DIBASIC: 224; 236 INJECTION, SOLUTION, CONCENTRATE INTRAVENOUS at 18:32

## 2023-10-15 RX ADMIN — INSULIN DETEMIR 8 UNITS: 100 INJECTION, SOLUTION SUBCUTANEOUS at 21:27

## 2023-10-15 RX ADMIN — INSULIN HUMAN 8 UNITS: 100 INJECTION, SOLUTION PARENTERAL at 18:37

## 2023-10-15 RX ADMIN — INSULIN HUMAN 12 UNITS: 100 INJECTION, SOLUTION PARENTERAL at 23:48

## 2023-10-15 RX ADMIN — Medication 200 MCG: at 09:46

## 2023-10-15 RX ADMIN — Medication 1 SPRAY: at 04:43

## 2023-10-15 RX ADMIN — SODIUM CHLORIDE 75 ML/HR: 9 INJECTION, SOLUTION INTRAVENOUS at 23:12

## 2023-10-15 RX ADMIN — PHENYLEPHRINE HYDROCHLORIDE 50 MG: 10 INJECTION INTRAVENOUS at 21:22

## 2023-10-15 RX ADMIN — Medication 10 ML: at 21:27

## 2023-10-15 RX ADMIN — SMOFLIPID 250 ML: 6; 6; 5; 3 INJECTION, EMULSION INTRAVENOUS at 18:42

## 2023-10-15 RX ADMIN — ALBUMIN (HUMAN): 12.5 INJECTION, SOLUTION INTRAVENOUS at 10:26

## 2023-10-15 RX ADMIN — SODIUM CHLORIDE: 9 INJECTION, SOLUTION INTRAVENOUS at 11:30

## 2023-10-15 RX ADMIN — HYDROMORPHONE HYDROCHLORIDE 0.25 MG: 2 INJECTION, SOLUTION INTRAMUSCULAR; INTRAVENOUS; SUBCUTANEOUS at 14:56

## 2023-10-15 RX ADMIN — FENTANYL CITRATE 100 MCG: 50 INJECTION, SOLUTION INTRAMUSCULAR; INTRAVENOUS at 09:39

## 2023-10-15 RX ADMIN — VASOPRESSIN 2 UNITS: 20 INJECTION INTRAVENOUS at 10:45

## 2023-10-15 RX ADMIN — PHENYLEPHRINE HYDROCHLORIDE 0.5 MCG/KG/MIN: 10 INJECTION INTRAVENOUS at 10:15

## 2023-10-15 RX ADMIN — SODIUM CHLORIDE 500 ML: 9 INJECTION, SOLUTION INTRAVENOUS at 22:10

## 2023-10-15 RX ADMIN — Medication 200 MCG: at 10:33

## 2023-10-15 RX ADMIN — ERTAPENEM 1 G: 1 INJECTION INTRAMUSCULAR; INTRAVENOUS at 09:46

## 2023-10-15 RX ADMIN — HYDROMORPHONE HYDROCHLORIDE 0.25 MG: 1 INJECTION, SOLUTION INTRAMUSCULAR; INTRAVENOUS; SUBCUTANEOUS at 21:42

## 2023-10-15 RX ADMIN — HEPARIN SODIUM 5000 UNITS: 5000 INJECTION INTRAVENOUS; SUBCUTANEOUS at 21:26

## 2023-10-15 RX ADMIN — ONDANSETRON 4 MG: 2 INJECTION INTRAMUSCULAR; INTRAVENOUS at 11:35

## 2023-10-15 RX ADMIN — HEPARIN SODIUM 5000 UNITS: 5000 INJECTION INTRAVENOUS; SUBCUTANEOUS at 15:52

## 2023-10-15 RX ADMIN — DEXAMETHASONE SODIUM PHOSPHATE 4 MG: 10 INJECTION, SOLUTION INTRAMUSCULAR; INTRAVENOUS at 09:43

## 2023-10-15 RX ADMIN — IPRATROPIUM BROMIDE AND ALBUTEROL SULFATE 3 ML: 2.5; .5 SOLUTION RESPIRATORY (INHALATION) at 09:14

## 2023-10-15 RX ADMIN — ROCURONIUM BROMIDE 70 MG: 10 SOLUTION INTRAVENOUS at 09:39

## 2023-10-15 RX ADMIN — FOLIC ACID 1 MG: 5 INJECTION, SOLUTION INTRAMUSCULAR; INTRAVENOUS; SUBCUTANEOUS at 15:52

## 2023-10-15 RX ADMIN — DEXAMETHASONE SODIUM PHOSPHATE 4 MG: 4 INJECTION, SOLUTION INTRAMUSCULAR; INTRAVENOUS at 09:46

## 2023-10-15 RX ADMIN — Medication 200 MCG: at 09:55

## 2023-10-15 RX ADMIN — THIAMINE HYDROCHLORIDE 100 MG: 100 INJECTION, SOLUTION INTRAMUSCULAR; INTRAVENOUS at 15:53

## 2023-10-15 RX ADMIN — Medication 200 MCG: at 10:38

## 2023-10-15 RX ADMIN — LORAZEPAM 0.25 MG: 2 INJECTION INTRAMUSCULAR; INTRAVENOUS at 06:27

## 2023-10-15 RX ADMIN — LIDOCAINE HYDROCHLORIDE 50 MG: 10 INJECTION, SOLUTION EPIDURAL; INFILTRATION; INTRACAUDAL; PERINEURAL at 09:39

## 2023-10-15 RX ADMIN — ETOMIDATE 12 MG: 2 INJECTION INTRAVENOUS at 09:39

## 2023-10-15 RX ADMIN — Medication 1 SPRAY: at 00:09

## 2023-10-15 RX ADMIN — DEXTROSE MONOHYDRATE 60 ML/HR: 100 INJECTION, SOLUTION INTRAVENOUS at 15:53

## 2023-10-15 RX ADMIN — BUPIVACAINE HYDROCHLORIDE 55 ML: 2.5 INJECTION, SOLUTION EPIDURAL; INFILTRATION; INTRACAUDAL; PERINEURAL at 09:43

## 2023-10-15 RX ADMIN — SODIUM CHLORIDE: 9 INJECTION, SOLUTION INTRAVENOUS at 09:29

## 2023-10-15 RX ADMIN — SUGAMMADEX 200 MG: 100 INJECTION, SOLUTION INTRAVENOUS at 11:41

## 2023-10-15 RX ADMIN — ALBUMIN (HUMAN): 12.5 INJECTION, SOLUTION INTRAVENOUS at 10:54

## 2023-10-15 RX ADMIN — Medication 100 MCG: at 10:07

## 2023-10-15 RX ADMIN — PHENYLEPHRINE HYDROCHLORIDE 2 MCG/KG/MIN: 10 INJECTION INTRAVENOUS at 21:26

## 2023-10-15 RX ADMIN — EPHEDRINE SULFATE 20 MG: 50 INJECTION INTRAVENOUS at 10:41

## 2023-10-15 NOTE — PROGRESS NOTES
[facility] Medicine Services  PROGRESS NOTE    Patient Name: Iqra Britt  : 1950  MRN: [id number]    Date of Admission: 10/11/2023  Primary Care Physician: Georgie Fernández DO    Subjective   Subjective     CC:  F/U abdominal distension    HPI:  Seen this morning. No new complaints. Still no flatus. Going for surgery today.      Objective   Objective     Vital Signs:   Temp:  [96.5 °F (35.8 °C)-98.5 °F (36.9 °C)] 96.5 °F (35.8 °C)  Heart Rate:  [100-118] 116  Resp:  [16-18] 18  BP: (111-127)/(60-74) 121/64     Physical Exam:  Gen-no acute distress  HENT-NCAT, mucous membranes moist  CV-tachycardic, S1 S2 normal, no m/r/g  Resp-CTAB, no wheezes or rales  Abd-distended, NT, hypoactive BS  Ext-no edema  Neuro-A&Ox3, no focal deficits  Skin-no rashes  Psych-appropriate mood  No change from 10/14/23      Results Reviewed:  LAB RESULTS:      Lab 10/15/23  0405 10/14/23  1255 10/14/23  0430 10/12/23  0416 10/11/23  1340   WBC 9.65  --  10.79 9.53 14.40*   HEMOGLOBIN 9.8*  --  9.8* 10.8* 12.0   HEMATOCRIT 33.3*  --  31.7* 34.2 38.9   PLATELETS 332  --  358 450 585*   NEUTROS ABS  --   --   --  8.67* 13.15*   IMMATURE GRANS (ABS)  --   --   --   --  0.13*   LYMPHS ABS  --   --   --   --  0.25*   MONOS ABS  --   --   --   --  0.84   EOS ABS  --   --   --  0.00 0.00   MCV 87.6  --  83.6 81.6 82.4   CRP  --  16.83*  --   --   --    PROTIME  --   --   --   --  16.9*         Lab 10/15/23  0508 10/14/23  2207 10/14/23  1255 10/14/23  0430 10/12/23  0416 10/11/23  1354 10/11/23  1347 10/11/23  1340   SODIUM 144  --  146* 144 130*  --   --  133*   POTASSIUM 3.8 4.3 3.5 3.3* 4.0  --   --  4.5   CHLORIDE 111*  --  107 108* 95*  --   --  91*   CO2 25.0  --  19.0* 21.0* 23.0  --   --  22.0   ANION GAP 8.0  --  20.0* 15.0 12.0  --   --  20.0*   BUN 32*  --  34* 37* 58*  --   --  58*   CREATININE 0.92  --  0.99 0.94 1.52* 2.10   < > 1.72*   EGFR 66.3  --  60.7 64.6 36.3*  --   --  31.3*    GLUCOSE 217*  --  152* 120* 285*  --   --  358*   CALCIUM 8.3*  --  8.1* 8.0* 7.9*  --   --  8.5*   IONIZED CALCIUM  --   --  1.29  --   --   --   --   --    MAGNESIUM 2.4  --  2.3  --   --   --   --   --    PHOSPHORUS 2.5  --  4.0  --   --   --   --   --    HEMOGLOBIN A1C  --   --   --   --  8.80*  --   --   --     < > = values in this interval not displayed.         Lab 10/14/23  1255 10/12/23  0416 10/11/23  1340   TOTAL PROTEIN 5.6* 6.1 7.2   ALBUMIN 2.6* 2.5* 3.3*   GLOBULIN 3.0 3.6 3.9   ALT (SGPT) 12 10 13   AST (SGOT) 47* 30 31   BILIRUBIN 0.5 0.4 0.4   BILIRUBIN DIRECT 0.3  --   --    ALK PHOS 54 46 56   LIPASE  --   --  7*         Lab 10/11/23  1340   PROBNP 1,889.0*   HSTROP T 28*   PROTIME 16.9*   INR 1.36*         Lab 10/14/23  1255   CHOLESTEROL 135   TRIGLYCERIDES 192*         Lab 10/14/23  1357   ABO TYPING A   RH TYPING Positive   ANTIBODY SCREEN Negative         Brief Urine Lab Results  (Last result in the past 365 days)        Color   Clarity   Blood   Leuk Est   Nitrite   Protein   CREAT   Urine HCG        10/12/23 0425 Yellow   Cloudy   Moderate (2+)   Small (1+)   Negative   30 mg/dL (1+)                   Microbiology Results Abnormal       Procedure Component Value - Date/Time    Anaerobic Culture - Body Fluid, Peritoneum [[id number]]  (Normal) Collected: 10/12/23 1139    Lab Status: Preliminary result Specimen: Body Fluid from Peritoneum Updated: 10/15/23 1020     Anaerobic Culture No anaerobes isolated at 3 days    Body Fluid Culture - Body Fluid, Peritoneum [[id number]] Collected: 10/12/23 1139    Lab Status: Preliminary result Specimen: Body Fluid from Peritoneum Updated: 10/15/23 0836     Body Fluid Culture No growth at 3 days     Gram Stain Moderate (3+) WBCs per low power field      No organisms seen    Fungus Smear - Peritoneal Fluid, Peritoneum [[id number]] Collected: 10/12/23 1139    Lab Status: Final result Specimen: Peritoneal Fluid from Peritoneum Updated: 10/13/23 4918     Fungal  "Stain No fungal elements seen            Peripheral Block    Result Date: 10/15/2023  Clay Macdonald CRNA     10/15/2023 10:24 AM Peripheral Block Patient reassessed immediately prior to procedure Patient location during procedure: OR Reason for block: at surgeon's request and post-op pain management Performed by Anesthesiologist: Clay Bowen MD Preanesthetic Checklist Completed: patient identified, IV checked, site marked, risks and benefits discussed, surgical consent, monitors and equipment checked, pre-op evaluation and timeout performed Prep: Pt Position: supine Sterile barriers:cap, gloves, mask and washed/disinfected hands Prep: ChloraPrep Patient monitoring: blood pressure monitoring, continuous pulse oximetry and EKG Procedure Sedation: yes Performed under: general Guidance:ultrasound guided Images:still images obtained, printed/placed on chart Laterality:Bilateral Block Type:TAP Injection Technique:single-shot Needle Type:short-bevel and echogenic Needle Gauge:20 G Resistance on Injection: none Medications Used: dexamethasone sodium phosphate injection - Injection  4 mg - 10/15/2023 9:43:00 AM bupivacaine PF (MARCAINE) 0.25 % injection - Injection  55 mL - 10/15/2023 9:43:00 AM Medications Comment:Block Injection:  LA dose divided between Right and Left block Post Assessment Injection Assessment: negative aspiration for heme, incremental injection and no paresthesia on injection Patient Tolerance:comfortable throughout block Complications:no Additional Notes Mid-Axillary/Lateral TAPs A high-frequency linear transducer, with sterile cover, was placed in the midaxillary line between the ASIS and costal margin. The External Oblique Muscle (EOM), Internal Oblique Muscle (IOM), Transverse Abdominus Muscle (MONTENEGRO), and Peritoneum were identified. The insertion site was prepped in sterile fashion and then localized with 2-5 ml of 1% Lidocaine. Using ultrasound-guidance, a 20-gauge B-Rodríguez 4\" Ultraplex 360 " non-stimulating echogenic needle was advanced in plane, from medial to lateral, until the tip of the needle was in the fascial plane between the IOM and MONTENEGRO. 1-3ml of preservative free normal saline was used to hydro-dissect the fascial planes. After the fascial plane was verified, the local anesthetic (LA) was injected. The procedure was repeated on the opposite side for bilateral coverage. Aspiration every 5 ml to prevent intravascular injection. Injection was completed with negative aspiration of blood and negative intravascular injection. Injection pressures were normal with minimal resistance. Midaxillary TAPs should reach intercostal nerves T10- T11 and the subcostal nerve T12.      XR Abdomen KUB    Result Date: 10/14/2023  XR ABDOMEN KUB Date of Exam: 10/14/2023 7:25 PM EDT Indication: New NG placement check Comparison: KUB dated 10/13/2023 Findings: There is a gastric suction tube which terminates in the proximal gastric body. There is persistent abnormal small bowel distention representing either obstruction or ileus. The transverse colon appears relatively collapsed. There are right upper quadrant  cholecystectomy clips.     Impression: Impression: 1. Gastric suction tube terminates in the proximal gastric body. 2. Abnormal gaseous distention of the small bowel likely representing small bowel obstruction or possible ileus. Electronically Signed: Parth Rai  10/14/2023 7:53 PM EDT  Workstation ID: [id number]    XR Abdomen 2+ VW with Chest 1 VW    Addendum Date: 10/14/2023    ADDENDUM #1 Right PICC tip overlies the superior vena cava. Electronically Signed: Aldair Zhou MD  10/14/2023 4:54 PM EDT  Workstation ID: [id number] ORIGINAL REPORT: XR ABDOMEN 2+ VIEWS W CHEST 1 VW Date of Exam: 10/14/2023 2:26 PM EDT Indication: bowel obstruction Comparison: CT abdomen pelvis 10/11/2023. Findings: Nasogastric tube tip and sidehole overlie the stomach. Bowel gas pattern is similar to prior radiographs and CT with  multiple loops of dilated small bowel. Lung bases are clear with pacemaker/defibrillator in place. Impression: Unchanged bowel gas pattern suggestive of persistent obstruction. Nasogastric tube tip and sidehole overlie the stomach. Electronically Signed: Aldair Zhou MD  10/14/2023 4:19 PM EDT  Workstation ID: [id number]    Result Date: 10/14/2023  XR ABDOMEN 2+ VIEWS W CHEST 1 VW Date of Exam: 10/14/2023 2:26 PM EDT Indication: bowel obstruction Comparison: CT abdomen pelvis 10/11/2023. Findings: Nasogastric tube tip and sidehole overlie the stomach. Bowel gas pattern is similar to prior radiographs and CT with multiple loops of dilated small bowel. Lung bases are clear with pacemaker/defibrillator in place.     Impression: Impression: Unchanged bowel gas pattern suggestive of persistent obstruction. Nasogastric tube tip and sidehole overlie the stomach. Electronically Signed: Aldair Zhou MD  10/14/2023 4:19 PM EDT  Workstation ID: [id number]         Current medications:  Scheduled Meds:[MAR Hold] donepezil, 5 mg, Nasogastric, Nightly  famotidine, 20 mg, Intravenous, Daily  [MAR Hold] Fat Emul Fish Oil/Plant Based, 250 mL, Intravenous, Q24H (TPN)  [MAR Hold] Fleets, 1 enema, Rectal, Daily  [Held by provider] heparin (porcine), 5,000 Units, Subcutaneous, Q8H  insulin detemir, 8 Units, Subcutaneous, Nightly  [MAR Hold] insulin regular, 3-14 Units, Subcutaneous, Q6H  [MAR Hold] ivabradine HCl, 7.5 mg, Nasogastric, BID With Meals  [MAR Hold] LORazepam, 0.25 mg, Intravenous, Q6H  [MAR Hold] sacubitril-valsartan, 1 tablet, Nasogastric, BID  [MAR Hold] sertraline, 75 mg, Nasogastric, Daily  [MAR Hold] sodium chloride, 10 mL, Intravenous, Q12H  [MAR Hold] sodium chloride, 10 mL, Intravenous, Q12H  [MAR Hold] spironolactone, 25 mg, Nasogastric, Daily      Continuous Infusions:Adult Cyclic 2-in-1 TPN, , Last Rate: Stopped (10/15/23 0850)  Pharmacy to Dose TPN,       PRN Meds:.  [MAR Hold] acetaminophen    [MAR Hold] Calcium  Replacement - Follow Nurse / BPA Driven Protocol    [MAR Hold] dextrose    [MAR Hold] dextrose    fentanyl    [MAR Hold] glucagon (human recombinant)    hydrALAZINE    HYDROmorphone    [MAR Hold] ipratropium-albuterol    ipratropium-albuterol    lactated ringers    [MAR Hold] Magnesium Standard Dose Replacement - Follow Nurse / BPA Driven Protocol    [MAR Hold] Morphine    [MAR Hold] ondansetron    ondansetron    Pharmacy to Dose TPN    [MAR Hold] phenol    [MAR Hold] Phosphorus Replacement - Follow Nurse / BPA Driven Protocol    Potassium Replacement - Follow Nurse / BPA Driven Protocol    sodium chloride    [COMPLETED] Insert Peripheral IV **AND** [MAR Hold] sodium chloride    [MAR Hold] sodium chloride    [MAR Hold] sodium chloride    [MAR Hold] sodium chloride    sterile water    Assessment & Plan   Assessment & Plan     Active Hospital Problems    Diagnosis  POA    **SBO (small bowel obstruction) [K56.609]  Yes    Ovarian mass [N83.8]  Yes    COPD (chronic obstructive pulmonary disease) [J44.9]  Yes    Chronic HFrEF (heart failure with reduced ejection fraction) [I50.22]  Yes    CKD (chronic kidney disease), stage III [N18.30]  Yes    Type 2 diabetes mellitus [E11.9]  Yes      Resolved Hospital Problems   No resolved problems to display.        Brief Hospital Course to date:  Iqra Britt is a 72 y.o. female with PMH significant for HFrEF/NICM s/p BiV ICD (LVEF 26%), HTN, CKDIII, Arthritis, DMII, and COPD who presents to [facility] with complaints of abdominal distention and pain along with nausea/vomiting.    This patient's problems and plans were partially entered by my partner and updated as appropriate by me 10/15/23. Copied text in this note has been reviewed and is accurate as of today's date.      SBO  --CT A/P showing small bowel with transition point in right lower abdomen near a large central abdominopelvic mass likely arising from the right ovary  --Dr. Draper following, continue NG tube; okay to  give PO meds and clamp NG afterward per Dr. Draper  --NPO, gentle IVF for now (given hx of CHF)  --Placed PICC line and started TPN on 10/14/23  --PRN pain control  --Given lack of improvement despite bowel rest, fluids, enemas, Dr. Draper plans for OR today for ex-lap, ostomy, tumor biopsy, etc.     Large pelvic mass with ascites, with concern for carcinomatosis and retroperitoneal lymphadenopathy  --CA 19-9, CEA, CA-125 all significantly elevated per review of OSH records, repeat CA-125 on admission elevated at 776  --CT A/P showing small bowel with transition point in right lower abdomen near a large central abdominopelvic mass likely arising from the right ovary, small to moderate volume ascites with features concerning for carcinomatosis, retroperitoneal lymphadenopathy likely metastatic   --Concern for primary ovarian malignancy with malignant ascites  --Dr. Draper following, continuing to discuss options with family  --IR did not see a significant amount of ascites, were able to remove 0.7 L from a small pocket of fluid in her RUQ on 10/12/23 and sent for studies including cytology - pending  --Recent negative endometrial biopsy 5/2023 and recent D&C 10/9/23 with pathology showing benign polyp  --Given her comorbidities, she is not a great surgical candidate for surgical debulking but will see what Dr. Draper is able to accomplish during above surgery    Hypokalemia  --Replace per protocol     CKDIII  --Baseline Cr appears to be around 1.5  --Avoid nephrotoxins, holding Lasix for now  --Improved from baseline today at 0.94     HFrEF/NICM s/p BiV ICD (LVEF 26% 9/2023)  --Followed by Cardiology at   --Will hold Lasix for now  --Resumed Entresto, Ivabradine, Spironolactone 10/13/23   --Monitor for volume overload  --Cardiology following     Abnormal UA  --Sample contaminated with significant squamous cells, repeat UA unremarkable for acute infection     COPD  --Not in exacerbation  --CXR negative on  admission  --On room air  --PRN Duo-nebs     DM2, uncontrolled  --HbA1c 8.8%  --SSI coverage while NPO  --Decreased Levemir to 5 units daily as glucose was running on lower side, but now running high again likely due to TPN -- will increase Levemir to 8 units nightly     Expected Discharge Location and Transportation: home  Expected Discharge   Expected Discharge Date: 10/19/2023; Expected Discharge Time:      DVT prophylaxis:  Medical DVT prophylaxis orders are present.     AM-PAC 6 Clicks Score (PT): 18 (10/14/23 2000)    CODE STATUS:   Code Status and Medical Interventions:   Ordered at: 10/11/23 2507     Level Of Support Discussed With:    Patient     Code Status (Patient has no pulse and is not breathing):    CPR (Attempt to Resuscitate)     Medical Interventions (Patient has pulse or is breathing):    Full Support       Charito Amaro MD  10/15/23

## 2023-10-15 NOTE — ANESTHESIA PREPROCEDURE EVALUATION
Anesthesia Evaluation     Patient summary reviewed and Nursing notes reviewed                Airway   Mallampati: II  TM distance: >3 FB  Neck ROM: full  No difficulty expected  Dental - normal exam   (+) edentulous    Pulmonary    (+) a smoker (1988) Former, COPD,wheezes  Cardiovascular - normal exam    (+) pacemaker pacemaker, CHF , hyperlipidemia    ROS comment:   Echo 9/23: EF 26%, mild TR    Moderate CV risk per cardiology    Neuro/Psych- negative ROS  GI/Hepatic/Renal/Endo    (+) renal disease CRI, diabetes mellitus using insulin    ROS Comment: Pelvic mass/SBO    Musculoskeletal (-) negative ROS    Abdominal  - normal exam    Bowel sounds: normal.   Substance History - negative use     OB/GYN negative ob/gyn ROS         Other                      Anesthesia Plan    ASA 4     general     (A line  TAP  magnet)  intravenous induction     Anesthetic plan, risks, benefits, and alternatives have been provided, discussed and informed consent has been obtained with: patient.    Plan discussed with CRNA.      CODE STATUS:    Level Of Support Discussed With: Patient  Code Status (Patient has no pulse and is not breathing): CPR (Attempt to Resuscitate)  Medical Interventions (Patient has pulse or is breathing): Full Support

## 2023-10-15 NOTE — OP NOTE
Subjective     Date of Service:  10/15/23  Time of Service:  11:42 EDT    Surgical Staff: Surgeon(s) and Role:     * Dana Draper MD - Primary   Additional Staff:   Assistant: Melody Mcclendon PA  was responsible for performing the following activities: Retraction, Suction, Irrigation, Suturing, Closing, and Placing Dressing and their skilled assistance was necessary for the success of this case.     Pre-operative diagnosis(es): Pre-Op Diagnosis Codes:     * SBO (small bowel obstruction) [K56.609]     Post-operative diagnosis(es): Post-Op Diagnosis Codes:     * SBO (small bowel obstruction) [K56.609]   Procedure(s): Procedure(s):  LAPAROTOMY EXPLORATORY, RIGHT SALPINGO OOPHORECTOMY, LYSIS OF ADHESIONS, DESCENDING COLOSOTOMY     Antibiotics: INVANZ ordered on call to OR     Anesthesia: Type: General with Block  ASA:  IV     Objective      Operative findings: At the time of laparotomy, there is diffusely dilated small bowel.  There is a right ovarian mass consistent with malignancy.  There is a smaller left ovarian mass.  There was ascites.  There is a small bowel obstruction and a large bowel obstruction.  The small bowel obstruction was likely at the jejunum but could have been multifocal due to adhesive disease.  There is an impending large bowel obstruction with dilated cecum and transverse colon.  This was related to a tumor mass involving the colon at the left upper quadrant.  There was fairly minimal carcinomatosis and no omental caking.  Due to these findings there is a possibility of an unusual cell type ovarian cancer or Krukenberg tumor.   Specimens removed: ID Type Source Tests Collected by Time   A :  Tissue Ovary, Right with Fallopian Tube TISSUE PATHOLOGY EXAM Dana Draper MD 10/15/2023 1022      Fluid Intake and Output: I/O this shift:  In: 1500 [I.V.:1000; IV Piggyback:500]  Out: 2365 [Urine:115; Emesis/NG output:1000; Other:700; Blood:550]   Blood products used: Yes; 500 mL 5% albumin    Drains: NG/OG Tube Nasogastric 18 Fr Right nostril (Active)   Placement Verification X-ray 10/14/23 2000   Site Assessment Clean;Dry;Intact 10/14/23 2000   Securement taped to nostril center 10/14/23 2000   Secured at (cm) 55 10/14/23 2000   NG/OG Site Interventions Site assessed 10/14/23 2000   Status Clamped 10/14/23 2000       Colostomy LLQ (Active)       Urethral Catheter Silicone 16 Fr. (Active)       [REMOVED] NG/OG Tube Nasogastric Left nostril (Removed)   Placement Verification X-ray 10/12/23 0600   Site Assessment Dry;Clean;Intact 10/14/23 1600   Securement anchored to nostril center with adhesive device 10/14/23 1600   Secured at (cm) 55 10/14/23 1200   NG/OG Site Interventions Site assessed 10/14/23 1200   Dressing Intervention New dressing 10/13/23 0800   Status Suction-low intermittent 10/14/23 1600   Drainage Appearance Brown;[place] 10/14/23 0800   Surrounding Skin Dry;Intact 10/14/23 0800   Output (mL) 50 mL 10/12/23 2000      Implant Information: Implant Name Type Inv. Item Serial No.  Lot No. LRB No. Used Action   HEMOST ABS SURGIFOAM  8X12 10MM - [id number] Implant HEMOST ABS SURGIFOAM  8X12 10MM  [employer]  DIV OF  AND  [id number] N/A 1 Implanted   KT SEAL HEMOS ABS FLOSEAL MATRX FAST/PREP 10ML - [id number] Implant KT SEAL HEMOS ABS FLOSEAL MATRX FAST/PREP 10ML  [facility] [id number] N/A 1 Implanted   KT SEAL HEMOS ABS FLOSEAL MATRX FAST/PREP 10ML - [id number] Implant KT SEAL HEMOS ABS FLOSEAL MATRX FAST/PREP 10ML  [facility] [facility] [id number] N/A 1 Implanted   STPLR LNR CUT PROX THK 75MM GRN TCT75 - [id number] Implant STPLR LNR CUT PROX THK 75MM GRN TCT75  [facility]  DIV OF  AND J [id number] N/A 1 Implanted   KT SEAL HEMOS ABS FLOSEAL MATRX FAST/PREP 10ML - [id number] Implant KT SEAL HEMOS ABS FLOSEAL MATRX FAST/PREP 10ML  [facility] [id number] N/A 1 Implanted   HEMOST ABS SURGICEL PWDR 3GM - [id number] Implant HEMOST ABS SURGICEL PWDR 3GM  [employer]  KYLE  [id number] N/A 1 Implanted   HEMOST ABS SURGIFOAM  8X12 10MM - [id number] Implant HEMOST ABS SURGIFOAM  8X12 10MM  [employer]  DIV OF J AND J [id number] N/A 1 Implanted      Complications: No immediate   Intraoperative consult(s):    Condition: stable   Disposition: to PACU and then admit to  intensive care unit       Indications:  Patient is a pleasant 72-year-old woman who presented with large right ovarian mass, small bowel obstruction.  She failed conservative management and desired surgical intervention.  Risks and benefits of procedure were discussed.  Consent was signed and on chart.    Procedure: After obtaining informed consent, patient was taken the operating room and underwent general endotracheal anesthesia after patient site verification.  Regional block was performed by anesthesia team.  Feet were placed in Oliver stirrups.  Abdomen, perineum, vagina prepped and draped in usual sterile fashion.  Khan catheter was anchored.  Vertical midline incision was made.  Abdomen was entered without difficulty.  700 mL blood-tinged ascites was removed.  The above findings were noted.  Blunt dissection was used to free the small bowel in addition to Metzenbaum scissors where it was adhered to the right ovarian mass.  Bookwalter self-retaining retractor was placed.  Due to adhesive disease at the large right ovarian mass, plan was made to remove the right ovary.  Peritoneum overlying the right pelvic sidewall was divided.  Identification of ureter was challenging due to induration and tumor bleeding.  Therefore, utero-ovarian ligament was divided using greater curved clamp, Bovie electrocautery, and 0 Vicryl suture.  Infundibulopelvic ligament could then be isolated and pedicle was created using right angle clamp, Bovie electrocautery, and 0 Vicryl tie.  Right angle clamp was placed across the residual posterior leaf of the broad ligament due to thickened tissue and concern for tumor involving this area as well.   Pedicle was created using Bovie electrocautery and 0 Vicryl suture.  Specimen was sent for permanent pathology.  Attention was turned to the small bowel.  Cecum was markedly dilated.  Small bowel at the terminal ileum was decompressed.  Small bowel was serially inspected and another area of adhesions was identified.  This was divided both bluntly and with Bovie electrocautery.  Small bowel was then serially inspected and decompressed with large volume NG tube output as noted above.  Dilated cecum was again identified.  Transverse colon was also dilated.  Area of tumor at the left upper quadrant was noted and plan was made for colostomy due to impending large bowel obstruction.  Colon was mobilized proximal to the tumor mass.  Mesentery was divided adjacent to the colon.  75 mm CARLTON stapler with a green load was used to transect the colon.  Further mobilization of the proximal portion was performed and hemostasis was achieved at the mesentery at both the proximal and distal areas.  Kocher was placed at the fascia and adequate length was noted at the colon.  Abdominal cavity was copiously irrigated and aspirated.  Pelvis was reinspected.  Bleeding was identified.  Bovie electrocautery was used to obtain hemostasis at focal bleeding areas.  However, tumor bed at the pelvis was diffusely bleeding and was not amenable to cautery or other forms of surgical hemostasis.  Therefore, a total of 30 of Floseal, 2 packages of Gelfoam, and surgery powder were used.  Pressure was held.  Adequate hemostasis was noted.  Kocher clamp was placed at the fascia.  Area for ostomy was identified.  Skin was elevated with Kocher clamp and circumferential incision was performed.  Cruciate incision was performed at the fascia.  Colon was brought through the fascial defect using Herbert.  Adequate fascial defect was noted for ostomy.  All instruments, retractors, and laparotomy sponges were removed from the abdominal cavity.  Fascia was  reapproximated using #1 looped PDS in a bulk closure.  All suprafascial tissue was irrigated, aspirated, and made hemostatic.  Skin was closed with stapler.  Dressing was placed at the incision.  Attention was turned to the ostomy maturation.  0 Vicryl suture was placed at the colon at the level of the fascia x3.  Dalton ostomy closure was performed.  Ostomy appeared viable.  Output was immediately noted.  There was good hemostasis at the ostomy.  All counts were correct.  Patient was taken to the recovery room in guarded, but overall stable condition.  There were no immediate complications.      Dana Draper MD  10/15/23  11:42 EDT

## 2023-10-15 NOTE — ADDENDUM NOTE
Addendum  created 10/15/23 1208 by Clay Macdonald CRNA    Intraprocedure Event deleted, Intraprocedure Event edited, Intraprocedure Meds edited, Intraprocedure Staff edited

## 2023-10-15 NOTE — PLAN OF CARE
Problem: Adult Inpatient Plan of Care  Goal: Plan of Care Review  Outcome: Ongoing, Progressing  Goal: Patient-Specific Goal (Individualized)  Outcome: Ongoing, Progressing  Goal: Absence of Hospital-Acquired Illness or Injury  Outcome: Ongoing, Progressing  Intervention: Identify and Manage Fall Risk  Description: Perform standard risk assessment on admission using a validated tool or comprehensive approach appropriate to the patient; reassess fall risk frequently, with change in status or transfer to another level of care.  Communicate fall injury risk to interprofessional healthcare team.  Determine need for increased observation, equipment and environmental modification, such as low bed, signage and supportive, nonskid footwear.  Adjust safety measures to individual developmental age, stage and identified risk factors.  Reinforce the importance of safety and physical activity with patient and family.  Perform regular intentional rounding to assess need for position change, pain assessment and personal needs, including assistance with toileting.  Recent Flowsheet Documentation  Taken 10/15/2023 0400 by Olga Jarvis, RN  Safety Promotion/Fall Prevention:   activity supervised   assistive device/personal items within reach   clutter free environment maintained   fall prevention program maintained   lighting adjusted   mobility aid in reach   room organization consistent   nonskid shoes/slippers when out of bed   safety round/check completed  Taken 10/15/2023 0200 by Olga Jarvis, RN  Safety Promotion/Fall Prevention:   activity supervised   assistive device/personal items within reach   clutter free environment maintained   fall prevention program maintained   lighting adjusted   mobility aid in reach   nonskid shoes/slippers when out of bed   room organization consistent   safety round/check completed  Taken 10/15/2023 0000 by Olga Jarvis, RN  Safety Promotion/Fall Prevention:   activity supervised    assistive device/personal items within reach   clutter free environment maintained   fall prevention program maintained   lighting adjusted   mobility aid in reach   nonskid shoes/slippers when out of bed   room organization consistent   safety round/check completed  Taken 10/14/2023 2200 by Olga Jarvis RN  Safety Promotion/Fall Prevention:   activity supervised   assistive device/personal items within reach   clutter free environment maintained   fall prevention program maintained   lighting adjusted   mobility aid in reach   nonskid shoes/slippers when out of bed   room organization consistent   safety round/check completed  Taken 10/14/2023 2000 by Olga Jarvis RN  Safety Promotion/Fall Prevention:   activity supervised   assistive device/personal items within reach   clutter free environment maintained   fall prevention program maintained   lighting adjusted   mobility aid in reach   nonskid shoes/slippers when out of bed   room organization consistent   safety round/check completed  Intervention: Prevent Skin Injury  Description: Perform a screening for skin injury risk, such as pressure or moisture associated skin damage on admission and at regular intervals throughout hospital stay.  Keep all areas of skin (especially folds) clean and dry.  Maintain adequate skin hydration.  Relieve and redistribute pressure and protect bony prominences; implement measures based on patient-specific risk factors.  Match turning and repositioning schedule to clinical condition.  Encourage weight shift frequently; assist with reposition if unable to complete independently.  Float heels off bed; avoid pressure on the Achilles tendon.  Keep skin free from extended contact with medical devices.  Encourage functional activity and mobility, as early as tolerated.  Use aids (e.g., slide boards, mechanical lift) during transfer.  Recent Flowsheet Documentation  Taken 10/15/2023 0642 by Olga Jarvis, RN  Skin Protection:    adhesive use limited   tubing/devices free from skin contact   transparent dressing maintained   skin-to-skin areas padded   skin-to-device areas padded   incontinence pads utilized  Taken 10/15/2023 0400 by Olga Jarvis RN  Body Position: position changed independently  Skin Protection:   adhesive use limited   tubing/devices free from skin contact   transparent dressing maintained   skin-to-skin areas padded   skin-to-device areas padded   incontinence pads utilized   skin sealant/moisture barrier applied  Taken 10/15/2023 0200 by Olga Jarvis RN  Body Position: position changed independently  Skin Protection:   adhesive use limited   tubing/devices free from skin contact   transparent dressing maintained   skin-to-device areas padded   skin-to-skin areas padded   incontinence pads utilized  Taken 10/15/2023 0000 by Olga Jarvis RN  Body Position: position changed independently  Skin Protection:   adhesive use limited   tubing/devices free from skin contact   incontinence pads utilized   skin-to-skin areas padded   skin-to-device areas padded   skin sealant/moisture barrier applied   transparent dressing maintained  Taken 10/14/2023 2200 by Olga Jarvis RN  Body Position: position changed independently  Skin Protection:   adhesive use limited   tubing/devices free from skin contact   transparent dressing maintained   skin-to-skin areas padded   skin-to-device areas padded   incontinence pads utilized  Taken 10/14/2023 2000 by Olga Javris RN  Body Position: position changed independently  Skin Protection:   adhesive use limited   tubing/devices free from skin contact   transparent dressing maintained   skin-to-skin areas padded   skin-to-device areas padded   skin sealant/moisture barrier applied   incontinence pads utilized  Intervention: Prevent and Manage VTE (Venous Thromboembolism) Risk  Description: Assess for VTE (venous thromboembolism) risk.  Encourage and assist with early  ambulation.  Initiate and maintain compression or other therapy, as indicated, based on identified risk in accordance with organizational protocol and provider order.  Encourage both active and passive leg exercises while in bed, if unable to ambulate.  Recent Flowsheet Documentation  Taken 10/15/2023 0400 by Olga Jarvis RN  Activity Management: activity encouraged  Taken 10/15/2023 0200 by Olga Jarvis RN  Activity Management: back to bed  Taken 10/15/2023 0000 by Olga Jarvis RN  Activity Management: back to bed  Taken 10/14/2023 2200 by Olga Jarvis RN  Activity Management: back to bed  Taken 10/14/2023 2000 by Olga Jarvis RN  Activity Management: back to bed  Intervention: Prevent Infection  Description: Maintain skin and mucous membrane integrity; promote hand, oral and pulmonary hygiene.  Optimize fluid balance, nutrition, sleep and glycemic control to maximize infection resistance.  Identify potential sources of infection early to prevent or mitigate progression of infection (e.g., wound, lines, devices).  Evaluate ongoing need for invasive devices; remove promptly when no longer indicated.  Recent Flowsheet Documentation  Taken 10/14/2023 2000 by Olga Jarvis RN  Infection Prevention:   environmental surveillance performed   equipment surfaces disinfected   hand hygiene promoted   personal protective equipment utilized  Goal: Optimal Comfort and Wellbeing  Outcome: Ongoing, Progressing  Intervention: Provide Person-Centered Care  Description: Use a family-focused approach to care.  Develop trust and rapport by proactively providing information, encouraging questions, addressing concerns and offering reassurance.  Acknowledge emotional response to hospitalization.  Recognize and utilize personal coping strategies.  Honor spiritual and cultural preferences.  Recent Flowsheet Documentation  Taken 10/15/2023 0000 by Olga Jarvis RN  Trust Relationship/Rapport:   care explained   choices  provided   emotional support provided   empathic listening provided   questions answered   questions encouraged   thoughts/feelings acknowledged  Taken 10/14/2023 2200 by Olga Jarvis RN  Trust Relationship/Rapport:   care explained   choices provided   emotional support provided   empathic listening provided   questions answered   questions encouraged   thoughts/feelings acknowledged  Taken 10/14/2023 2000 by Olga Jarvis, RN  Trust Relationship/Rapport:   care explained   choices provided   emotional support provided   empathic listening provided   questions answered   questions encouraged   thoughts/feelings acknowledged  Goal: Readiness for Transition of Care  Outcome: Ongoing, Progressing   Goal Outcome Evaluation:

## 2023-10-15 NOTE — PROGRESS NOTES
Gynecologic Oncology   Daily Progress Note    Subjective   No acute events overnight.   On Home cardiac meds with NGT clamp      Objective   Temp:  [97.3 °F (36.3 °C)-98.5 °F (36.9 °C)] 98 °F (36.7 °C)  Heart Rate:  [100-111] 111  Resp:  [16-18] 16  BP: (111-127)/(60-74) 121/74  Vitals:    10/15/23 0626   BP: 121/74   Pulse:    Resp:    Temp:    SpO2:      I/O last 3 completed shifts:  In: -   Out: 550 [Urine:550]   NGT:  0 output x 24 hours              GENERAL: Alert, well-appearing female in no apparent distress.    HEENT: Sclera anicteric, normal eyelids. Head normocephalic, atraumatic. Mucus membranes moist.  Normal dentition.  Trachea midline    CARDIOVASCULAR: Tachycardic, regular rhythm, no murmurs, rubs, or gallops.    RESPIRATORY: wheezing bilaterally LT>RT, normal respiratory effort, NC O2  GASTROINTESTINAL: Distended, soft/not ridged, no BS  GENITOURINARY: Pur-wick  SKIN:  Warm, dry, well-perfused.    PSYCHIATRIC: AO x3, with appropriate affect, normal thought processes  MSK/EXTREMITIES: FROMx4.  No digital cyanosis.    Symmetric. No peripheral edema.  +SCDs.    RESULTS   Recent Results (from the past 24 hour(s))   POC Glucose Once    Collection Time: 10/14/23 11:59 AM    Specimen: Blood   Result Value Ref Range    Glucose 153 (H) 70 - 130 mg/dL   Comprehensive Metabolic Panel    Collection Time: 10/14/23 12:55 PM    Specimen: Blood   Result Value Ref Range    Glucose 152 (H) 65 - 99 mg/dL    BUN 34 (H) 8 - 23 mg/dL    Creatinine 0.99 0.57 - 1.00 mg/dL    Sodium 146 (H) 136 - 145 mmol/L    Potassium 3.5 3.5 - 5.2 mmol/L    Chloride 107 98 - 107 mmol/L    CO2 19.0 (L) 22.0 - 29.0 mmol/L    Calcium 8.1 (L) 8.6 - 10.5 mg/dL    Total Protein 5.6 (L) 6.0 - 8.5 g/dL    Albumin 2.6 (L) 3.5 - 5.2 g/dL    ALT (SGPT) 12 1 - 33 U/L    AST (SGOT) 47 (H) 1 - 32 U/L    Alkaline Phosphatase 54 39 - 117 U/L    Total Bilirubin 0.5 0.0 - 1.2 mg/dL    Globulin 3.0 gm/dL    A/G Ratio 0.9 g/dL    BUN/Creatinine Ratio 34.3  (H) 7.0 - 25.0    Anion Gap 20.0 (H) 5.0 - 15.0 mmol/L    eGFR 60.7 >60.0 mL/min/1.73   Magnesium    Collection Time: 10/14/23 12:55 PM    Specimen: Blood   Result Value Ref Range    Magnesium 2.3 1.6 - 2.4 mg/dL   Phosphorus    Collection Time: 10/14/23 12:55 PM    Specimen: Blood   Result Value Ref Range    Phosphorus 4.0 2.5 - 4.5 mg/dL   Calcium, Ionized    Collection Time: 10/14/23 12:55 PM    Specimen: Blood   Result Value Ref Range    Ionized Calcium 1.29 1.12 - 1.32 mmol/L   Prealbumin    Collection Time: 10/14/23 12:55 PM    Specimen: Blood   Result Value Ref Range    Prealbumin 7.3 (L) 20.0 - 40.0 mg/dL   Cholesterol, Total    Collection Time: 10/14/23 12:55 PM    Specimen: Blood   Result Value Ref Range    Total Cholesterol 135 0 - 200 mg/dL   Triglycerides    Collection Time: 10/14/23 12:55 PM    Specimen: Blood   Result Value Ref Range    Triglycerides 192 (H) 0 - 150 mg/dL   C-reactive Protein    Collection Time: 10/14/23 12:55 PM    Specimen: Blood   Result Value Ref Range    C-Reactive Protein 16.83 (H) 0.00 - 0.50 mg/dL   Bilirubin, Direct    Collection Time: 10/14/23 12:55 PM    Specimen: Blood   Result Value Ref Range    Bilirubin, Direct 0.3 0.0 - 0.3 mg/dL   Type & Screen    Collection Time: 10/14/23  1:57 PM    Specimen: Blood   Result Value Ref Range    ABO Type A     RH type Positive     Antibody Screen Negative     T&S Expiration Date 10/17/2023 11:59:59 PM    POC Glucose Once    Collection Time: 10/14/23  5:40 PM    Specimen: Blood   Result Value Ref Range    Glucose 160 (H) 70 - 130 mg/dL   Potassium    Collection Time: 10/14/23 10:07 PM    Specimen: Blood   Result Value Ref Range    Potassium 4.3 3.5 - 5.2 mmol/L   POC Glucose Once    Collection Time: 10/14/23 11:16 PM    Specimen: Blood   Result Value Ref Range    Glucose 193 (H) 70 - 130 mg/dL   CBC (No Diff)    Collection Time: 10/15/23  4:05 AM    Specimen: Blood   Result Value Ref Range    WBC 9.65 3.40 - 10.80 10*3/mm3    RBC 3.80  3.77 - 5.28 10*6/mm3    Hemoglobin 9.8 (L) 12.0 - 15.9 g/dL    Hematocrit 33.3 (L) 34.0 - 46.6 %    MCV 87.6 79.0 - 97.0 fL    MCH 25.8 (L) 26.6 - 33.0 pg    MCHC 29.4 (L) 31.5 - 35.7 g/dL    RDW 16.7 (H) 12.3 - 15.4 %    RDW-SD 52.9 37.0 - 54.0 fl    MPV 10.6 6.0 - 12.0 fL    Platelets 332 140 - 450 10*3/mm3   Basic Metabolic Panel    Collection Time: 10/15/23  5:08 AM    Specimen: Blood   Result Value Ref Range    Glucose 217 (H) 65 - 99 mg/dL    BUN 32 (H) 8 - 23 mg/dL    Creatinine 0.92 0.57 - 1.00 mg/dL    Sodium 144 136 - 145 mmol/L    Potassium 3.8 3.5 - 5.2 mmol/L    Chloride 111 (H) 98 - 107 mmol/L    CO2 25.0 22.0 - 29.0 mmol/L    Calcium 8.3 (L) 8.6 - 10.5 mg/dL    BUN/Creatinine Ratio 34.8 (H) 7.0 - 25.0    Anion Gap 8.0 5.0 - 15.0 mmol/L    eGFR 66.3 >60.0 mL/min/1.73   Magnesium    Collection Time: 10/15/23  5:08 AM    Specimen: Blood   Result Value Ref Range    Magnesium 2.4 1.6 - 2.4 mg/dL   Phosphorus    Collection Time: 10/15/23  5:08 AM    Specimen: Blood   Result Value Ref Range    Phosphorus 2.5 2.5 - 4.5 mg/dL   POC Glucose Once    Collection Time: 10/15/23  5:33 AM    Specimen: Blood   Result Value Ref Range    Glucose 218 (H) 70 - 130 mg/dL          Assessment & Plan   Iqra Britt is a 72 y.o. female with small bowel obstruction, pelvic mass     1.  Small bowel obstruction, pelvic mass, ascites  -Cytology pending from paracentesis  -Clinical findings consistent with gynecologic malignancy, favor ovarian cancer  -Despite NG tube placement 10/11/2023 and management with daily enemas, patient continues to have evidence of small bowel obstruction  -Acute abdominal series 11/14 with continued SBO  -TO OR for exploratory laparotomy, ostomy, tumor biopsy, possible G-tube  -Postop ICU bed requested    2.  Malnutrition  -PICC, TPN      3.  Significant comorbidities  -FIORDALIZA in the context of CKD stage III; normal creatinine today  -CHF, seen by cardiology.  Moderate risk for surgical  intervention per family's conversation this morning.  -Type 2 diabetes mellitus  -COPD     4.  Disposition  -To the OR, consent signed and on chart         Dana Draper MD  10/15/23  09:07 EDT

## 2023-10-15 NOTE — PROGRESS NOTES
Critical Care Note     LOS: 4 days   Patient Care Team:  Georgie Fernández DO as PCP - General (Family Medicine)    Chief Complaint/Reason for visit:    Chief Complaint   Patient presents with    abdominal distention          Subjective     Interval History:     Patient was hospitalized October 11 with small bowel obstruction and found to have a pelvic mass with ascites.  She did undergo a paracentesis with cytology pending.  GYN oncology evaluated and they suspected ovarian cancer.  Despite conservative measures, bowel obstruction worsened and she was taken to surgery today for exploratory laparotomy, ostomy, right salpingo oophorectomy.  She had some hypotension and surgery and comes to the ICU on phenylephrine 1 mcg/kg/min.  She did not receive blood products.  She is awake and in no distress.  She does have some incisional pain.  She also complains of nausea but denies vomiting.  She does have a remote history of smoking.  She uses inhalers only as needed.  She denies chronic cough or wheezing.  She reports a history of congestive heart failure.  She has a history of a BiV ICD.  She had a generator change in May 2022 at .  Echocardiogram in September of this year revealed an EF of 26%.  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  Entresto was initiated recently and she was continued on spironolactone 25 mg twice daily,[id number] daily and Ivabradine 7.5mg daily    Baseline creatinine is 1.17.  She does have a longstanding history of diabetes.  Her A1c is 8.8.    Review of Systems:    All systems were reviewed and negative except as noted in subjective.    Medical history, surgical history, social history, family history reviewed    Objective     Intake/Output:    Intake/Output Summary (Last 24 hours) at 10/15/2023 1459  Last data filed at 10/15/2023 1148  Gross per 24 hour   Intake 1700 ml   Output 2365 ml   Net -665 ml       Nutrition:  NPO Diet NPO Type: Strict  "NPO  Adult Central 2-in-1 TPN    Infusions:  Adult Central 2-in-1 TPN,   Pharmacy to Dose TPN,   phenylephrine, 0.5-3 mcg/kg/min, Last Rate: 1 mcg/kg/min (10/15/23 1447)        Mechanical Ventilator Settings:                                                Telemetry: Sinus rhythm/sinus tachycardia             Vital Signs  Blood pressure 94/64, pulse 104, temperature 97.1 °F (36.2 °C), temperature source Axillary, resp. rate 16, height 154.9 cm (61\"), weight 56.7 kg (125 lb), SpO2 99%.    Physical Exam:  General Appearance:  Older woman supine in bed in no respiratory distress   Head:  Atraumatic   Eyes:          Conjunctiva pale   Ears:     Throat: Oral mucosa moist, nasogastric tube in place   Neck: Trachea midline, neck veins flat   Back:      Lungs:   Respirations are equal, unlabored.  No wheeze or rhonchi    Heart:  Regular rhythm, S1, S2 auscultated, no appreciable murmur   Abdomen:   Abdominal binder in place, rare bowel sounds   Rectal:   Deferred   Extremities: No pretibial edema, nailbeds pink, right radial arterial line, right upper extremity PICC   Pulses: Palpable radial pulses   Skin: Warm and dry without rash   Lymph nodes: No cervical adenopathy   Neurologic: Awake and conversant,  equal      Results Review:     I reviewed the patient's new clinical results.   Results from last 7 days   Lab Units 10/15/23  0508 10/14/23  2207 10/14/23  1255 10/14/23  0430 10/12/23  0416 10/11/23  1347 10/11/23  1340   SODIUM mmol/L 144  --  146* 144 130*  --  133*   POTASSIUM mmol/L 3.8 4.3 3.5 3.3* 4.0  --  4.5   CHLORIDE mmol/L 111*  --  107 108* 95*  --  91*   CO2 mmol/L 25.0  --  19.0* 21.0* 23.0  --  22.0   BUN mg/dL 32*  --  34* 37* 58*  --  58*   CREATININE mg/dL 0.92  --  0.99 0.94 1.52*   < > 1.72*   CALCIUM mg/dL 8.3*  --  8.1* 8.0* 7.9*  --  8.5*   BILIRUBIN mg/dL  --   --  0.5  --  0.4  --  0.4   ALK PHOS U/L  --   --  54  --  46  --  56   ALT (SGPT) U/L  --   --  12  --  10  --  13   AST (SGOT) U/L  " "--   --  47*  --  30  --  31   GLUCOSE mg/dL 217*  --  152* 120* 285*  --  358*    < > = values in this interval not displayed.     Results from last 7 days   Lab Units 10/15/23  0405 10/14/23  0430 10/12/23  0416   WBC 10*3/mm3 9.65 10.79 9.53   HEMOGLOBIN g/dL 9.8* 9.8* 10.8*   HEMATOCRIT % 33.3* 31.7* 34.2   PLATELETS 10*3/mm3 332 358 450   MONOCYTES % %  --   --  4.0*   EOSINOPHIL % %  --   --  0.0*         No results found for: \"BLOODCX\"  Lab Results   Component Value Date    URINECX >100,000 CFU/mL Lactobacillus species (A) 10/12/2023       I reviewed the patient's new imaging including images and reports.    indings:  LUNG BASES:  Unremarkable without mass or infiltrate.    LIVER:  Unremarkable parenchyma without focal lesion.  BILIARY/GALLBLADDER: Cholecystectomy  SPLEEN:  Unremarkable  PANCREAS:  Unremarkable  ADRENAL:  Unremarkable  KIDNEYS:  Unremarkable parenchyma with no solid mass identified. No obstruction.  No calculus identified.  GASTROINTESTINAL/MESENTERY: Dilated small intestine measuring up to 4 cm in diameter with somewhat gradual tapering in the right central lower abdomen in the region of abdominopelvic mass. There is likely an element of mass effect/partial obstruction  related to the mass. Distal small intestine is decompressed. There is a moderate proximal and mid fecal load.  AORTA/IVC:  Normal caliber.    RETROPERITONEUM/LYMPH NODES: There are enlarged retroperitoneal lymph nodes including:  *2.2 x 2.0 cm left para-aortic mid abdominal node (image 64, series 2)  *1.5 x 1.4 cm left para-aortic lower abdominal node (image 70, series 2)    REPRODUCTIVE: There is a heterogeneous 14.9 x 11.7 cm mass within the central upper pelvis/lower abdomen. While this is adjacent to the uterine fundus it does not appear to be in direct communication and is most suggestive of an ovarian neoplasm likely  arising from the right ovary. There is a heterogeneously enlarged left ovary measuring 5.0 x 4.6 " cm.  BLADDER:  Unremarkable    OSSEUS STRUCTURES:  Typical for age with no acute process identified.    There is small to moderate volume ascites. There are areas of mesenteric spider webbing/infiltration with some suggestion of nodularity worrisome for carcinomatosis. However, this is difficult to assess given lack of intravenous contrast.   Impression:     Impression:  1.Large central abdominopelvic mass, likely arising from the right ovary.  2.Dilated small intestine with somewhat gradual transition in the right anterior lower abdomen near the after mentioned mass suggestive of at least partial mass effect/obstruction.  3.Small to moderate volume ascites with imaging features concerning for carcinomatosis.  4.Enlarged retroperitoneal lymph nodes, likely metastatic.            Electronically Signed: Tres Bauer MD   10/11/2023 3:09 PM CDT     All medications reviewed.   donepezil, 5 mg, Nasogastric, Nightly  famotidine, 20 mg, Intravenous, Daily  Fat Emul Fish Oil/Plant Based, 250 mL, Intravenous, Q24H (TPN)  folic acid 1 mg in sodium chloride 0.9 % 50 mL IVPB, 1 mg, Intravenous, Daily  heparin (porcine), 5,000 Units, Subcutaneous, Q8H  insulin detemir, 8 Units, Subcutaneous, Nightly  insulin regular, 3-14 Units, Subcutaneous, Q6H  ivabradine HCl, 7.5 mg, Nasogastric, BID With Meals  LORazepam, 0.25 mg, Intravenous, Q6H  phenylephrine, , ,   sacubitril-valsartan, 1 tablet, Nasogastric, BID  sertraline, 75 mg, Nasogastric, Daily  sodium chloride, 10 mL, Intravenous, Q12H  spironolactone, 25 mg, Nasogastric, Daily  thiamine (B-1) IV, 100 mg, Intravenous, Daily          Assessment & Plan       SBO (small bowel obstruction)    Ovarian mass    COPD (chronic obstructive pulmonary disease)    Chronic HFrEF (heart failure with reduced ejection fraction)    CKD (chronic kidney disease), stage III    Type 2 diabetes mellitus    72-year-old woman followed at  with nonischemic cardiomyopathy status post BiV ICD (LVEF  26%), history of Candida endocarditis and Acinetobacter sepsis, remote, hypertension, chronic kidney disease stage III (baseline creatinine 1.17), diabetes type 2 (A1c 8.8 ), with a history of postmenopausal bleeding, presenting with small bowel obstruction and found to have a pelvic mass suspicious for ovarian cancer and a small bowel obstruction.  She was also noted to have ascites.  She did undergo a paracentesis on October 12 with results pending.  Bowel obstruction did not improve with NG suction and daily enemas.  She was taken to surgery today for exploratory laparotomy, colostomy, right salpingo-oophorectomy, enterolysis.  She is now in the ICU on phenylephrine 1 mcg/kg/min.    Pelvic mass/suspected ovarian cancer  Small bowel obstruction    -10/15/24 exploratory laparotomy, enterolysis, right salpingo-oophorectomy, colostomy  -Requiring phenylephrine for blood pressure support  -Preoperative hemoglobin 9.8  -Paracentesis October 12, cytology pending  -NG to suction  -TPN    Nonischemic cardiomyopathy (EF 20%)  BiV ICD, generator change May 2022  Who class II pulmonary hypertension    -Echocardiogram, September 2023, EF 26%  -BiV ICD with generator change May 2022  -Follow[facility] at [facility], spironolactone, ivabradine, furosemide, recently started on low-dose Entresto  -Currently holding diuretics with hypotension on phenylephrine  -Would consider some low-dose dobutamine      Diabetes mellitus type 2 (A1c 8.8)  Chronic kidney disease stage III    -Creatinine 0.92, BUN 32  -Proteinuria  -Oliguria today  -Glucose 190-250 on TPN, sliding scale insulin    COPD  Obstructive sleep apnea    -Quit smoking in 1988  -Chart indicates some intolerance to CPAP, will hold any way to avoid gastric distention    PLAN:    Flowtrack to monitor hemodynamics  Phenylephrine for mean blood pressure greater than 65  Depending on her hemodynamic profile, gentle hydration  Consider dobutamine  Recheck H&H, transfuse  for 7 or less  Continue TPN  NG to suction  Hold, Entresto, Ivabradine, spironolactone    VTE Prophylaxis: subcutaneous heparin    Stress Ulcer Prophylaxis:suresh Dietz MD  10/15/23  14:59 EDT      Time: Critical care 40 min  I personally provided care to this critically ill patient as documented above.  Critical care time does not include time spent on separately billed procedures.  None of my critical care time was concurrent with other critical care providers.

## 2023-10-15 NOTE — ANESTHESIA PROCEDURE NOTES
Airway  Urgency: elective    Date/Time: 10/15/2023 9:40 AM  Airway not difficult    General Information and Staff    Patient location during procedure: OR  CRNA/CAA: Clay Macdonald CRNA    Indications and Patient Condition  Indications for airway management: airway protection    Preoxygenated: yes  MILS not maintained throughout  Mask difficulty assessment: 0 - not attempted    Final Airway Details  Final airway type: endotracheal airway      Successful airway: ETT  Cuffed: yes   Successful intubation technique: direct laryngoscopy and RSI  Facilitating devices/methods: cricoid pressure  Endotracheal tube insertion site: oral  Blade: Cristina  Blade size: 2  ETT size (mm): 7.0  Cormack-Lehane Classification: grade I - full view of glottis  Placement verified by: chest auscultation and capnometry   Cuff volume (mL): 6  Measured from: lips  ETT/EBT  to lips (cm): 20  Number of attempts at approach: 1  Assessment: lips, teeth, and gum same as pre-op and atraumatic intubation    Additional Comments  Negative epigastric sounds, Breath sound equal bilaterally with symmetric chest rise and fall. RSI with cricoid pressure until tube confirmation. VC clear upon DL

## 2023-10-15 NOTE — PROGRESS NOTES
CPN Review Note    Patient Name: Iqra Britt  Date of Encounter: 10/15/23 09:06 EDT  MRN: [id number]  Admission date: 10/11/2023    Reason for visit: CPN review . RD to continue to follow per protocol.     Information Obtained: TPN initiated yesterday, currently advancing to goal rate.     EMR reviewed:  yes  Medication reviewed: yes  Labs reviewed: yes    Current diet: NPO Diet NPO Type: Strict NPO  Adult Cyclic 2-in-1 TPN    PN Route: PICC  PN:   9% dextrose, 4.8% AA                GIR: 1.5  Lipid: 250ml SMOF lipid daily    PN@ Goal over 24hr to Supply:  Volume 1450 mL/day     Energy 1223 Kcal/day 91 % Est Need   Protein 70 g/day 100 % Est Need     ---------------------------------------------------------------------------  Formula/Rate verified at bedside: Yes  Infusing Rate at time of visit: 45ml/hr    Average Delivery from Charting: Insufficient data   PN Volume  mL/day     Lipid delivered?        Energy  Kcal/day  % Est Need   Protein  g/day  % Est Need       Intervention:  Follow treatment plan  Care plan reviewed  No PN change indicated at this time    Follow up:   Per protocol      Kamini Limon, MS,RD,LD  09:06 EDT  Time: 15min

## 2023-10-15 NOTE — PROGRESS NOTES
Pharmacy Parenteral Nutrition Evaluation    Iqra Britt is a  72 y.o. female receiving TPN.     Indication: Bowel Obstruction  Consulting Physician: Dr Draper    Total Fluid Rate (if stated): N/A    Labs  Results from last 7 days   Lab Units 10/15/23  0508 10/14/23  2207 10/14/23  1255 10/14/23  0430 10/12/23  0416 10/11/23  1347 10/11/23  1340   SODIUM mmol/L 144  --  146* 144 130*  --  133*   POTASSIUM mmol/L 3.8 4.3 3.5 3.3* 4.0  --  4.5   CHLORIDE mmol/L 111*  --  107 108* 95*  --  91*   CO2 mmol/L 25.0  --  19.0* 21.0* 23.0  --  22.0   BUN mg/dL 32*  --  34* 37* 58*  --  58*   CREATININE mg/dL 0.92  --  0.99 0.94 1.52*   < > 1.72*   CALCIUM mg/dL 8.3*  --  8.1* 8.0* 7.9*  --  8.5*   BILIRUBIN mg/dL  --   --  0.5  --  0.4  --  0.4   ALK PHOS U/L  --   --  54  --  46  --  56   ALT (SGPT) U/L  --   --  12  --  10  --  13   AST (SGOT) U/L  --   --  47*  --  30  --  31   GLUCOSE mg/dL 217*  --  152* 120* 285*  --  358*    < > = values in this interval not displayed.       Results from last 7 days   Lab Units 10/15/23  0508 10/14/23  1255   MAGNESIUM mg/dL 2.4 2.3   PHOSPHORUS mg/dL 2.5 4.0   PREALBUMIN mg/dL  --  7.3*       Results from last 7 days   Lab Units 10/15/23  0405 10/14/23  0430 10/12/23  0416   WBC 10*3/mm3 9.65 10.79 9.53   HEMOGLOBIN g/dL 9.8* 9.8* 10.8*   HEMATOCRIT % 33.3* 31.7* 34.2   PLATELETS 10*3/mm3 332 358 450       Triglycerides   Date Value Ref Range Status   10/14/2023 192 (H) 0 - 150 mg/dL Final     Comment:     Falsely depressed results may occur on samples drawn from patients receiving N-Acetylcysteine (NAC) or Metamizole.       estimated creatinine clearance is 45.6 mL/min (by C-G formula based on SCr of 0.92 mg/dL).    Intake & Output (last 3 days)         10/12 0701  10/13 0700 10/13 0701  10/14 0700 10/14 0701  10/15 0700 10/15 0701  10/16 0700    I.V. (mL/kg) 1200 (20.7)   1200 (20.3)    IV Piggyback    500    Total Intake(mL/kg) 1200 (20.7)   1700 (28.8)    Urine  (mL/kg/hr) 0 (0) 550 (0.4)  115 (0.3)    Emesis/NG output 50   1000    Other    700    Blood    550    Total Output 50 550  2365    Net +1150 -550  -665            Urine Unmeasured Occurrence 3 x 3 x 1 x     Stool Unmeasured Occurrence 1 x 3 x 1 x             Dietitian Recommendations  Diet Orders (active) (From admission, onward)       Start     Ordered    10/14/23 1800  Adult Cyclic 2-in-1 TPN  Cyclic TPN - See Admin Instructions        Note to Pharmacy: Iqra Britt  : 12/15/1960  CSN: [id number]  5G - S560    10/14/23 1405    10/14/23 1800  Fat Emul Fish Oil/Plant Based (SMOFLIPID) 20 % emulsion 250 mL  Every 24 Hours Scheduled (TPN)         10/14/23 1405    10/14/23 1239  NPO Diet NPO Type: Strict NPO  Diet Effective Now         10/14/23 1240                    Current TPN Regimen Recommendation:  Dextrose 9% / Amino Acid 4.8% at goal rate of 60 mL/hr.  20% SmofLipid 250mL every 24 hours.    Assessment/Plan:  Pharmacy to dose TPN ordered for bowel obstruction. TPN started 10/14.  Initial macronutrient recommendations per RD. 9%D, 4.8%AA, to start at goal rate of 60 mL/hr. Will continue standard electrolyte concentrations.  Levemir increased to 8 units nightly today, moderate-high SSI continued. Patient has T2DM, A1C of 8.8%.  Electrolyte replacements already ordered exogenously. Potassium, magnesium, calcium, and phosphorus replacements available.  Pharmacy will continue to follow and adjust TPN as appropriate.    Popeye Reyna, PharmD, BCPS  10/15/2023  13:53 EDT

## 2023-10-15 NOTE — PROGRESS NOTES
[facility]  Progress Note       LOS: 4 days   Patient Care Team:  Georgie Fernández DO as PCP - General (Family Medicine)    Chief Complaint:  follow up tachycardia     Subjective     Doing well this morning. Just woke up. No CP or SOB 93% on RA laying down.        Review of Systems:   Pertinent positives in HPI, all others reviewed and negative.      Objective       Current Facility-Administered Medications:     acetaminophen (TYLENOL) tablet 650 mg, 650 mg, Nasogastric, Q4H PRN, Charito Amaro MD    Adult Cyclic 2-in-1 TPN, , Intravenous, Cyclic TPN - See Admin Instructions, Popeye Reyna, PharmD, Last Rate: 45 mL/hr at 10/15/23 0205, Rate Change (DUAL SIGN) at 10/15/23 0205    Calcium Replacement - Follow Nurse / BPA Driven Protocol, , Does not apply, PRN, Charito Amaro MD    dextrose (D50W) (25 g/50 mL) IV injection 25 g, 25 g, Intravenous, Q15 Min PRN, Judy Lujan DO    dextrose (GLUTOSE) oral gel 15 g, 15 g, Oral, Q15 Min PRN, Dana Draper MD    donepezil (ARICEPT) tablet 5 mg, 5 mg, Nasogastric, Nightly, Charito Amaro MD, 5 mg at 10/14/23 2148    famotidine (PEPCID) injection 20 mg, 20 mg, Intravenous, Daily, Charito Amaro MD, 20 mg at 10/14/23 0908    Fat Emul Fish Oil/Plant Based (SMOFLIPID) 20 % emulsion 250 mL, 250 mL, Intravenous, Q24H (TPN), Popeye Reyna, PharmD, Last Rate: 20.8 mL/hr at 10/14/23 1758, 250 mL at 10/14/23 1758    Fleets enema 1 enema, 1 enema, Rectal, Daily, Dana Draper MD, 1 enema at 10/14/23 0936    glucagon (GLUCAGEN) injection 1 mg, 1 mg, Intramuscular, Q15 Min PRN, Judy Lujan DO    [Held by provider] heparin (porcine) 5000 UNIT/ML injection 5,000 Units, 5,000 Units, Subcutaneous, Q8H, Judy Lujan, , 5,000 Units at 10/14/23 1448    hydrALAZINE (APRESOLINE) injection 10 mg, 10 mg, Intravenous, Q6H PRN, Judy Lujan,     insulin detemir (LEVEMIR) injection 5 Units, 5 Units, Subcutaneous,  Nightly, Charito Amaro MD, 5 Units at 10/14/23 2149    insulin regular (humuLIN R,novoLIN R) injection 3-14 Units, 3-14 Units, Subcutaneous, Q6H, Dana Draper MD, 5 Units at 10/15/23 0626    ipratropium-albuterol (DUO-NEB) nebulizer solution 3 mL, 3 mL, Nebulization, Q4H PRN, Judy Lujan DO    ivabradine HCl (CORLANOR) tablet 7.5 mg, 7.5 mg, Nasogastric, BID With Meals, Charito Amaro MD, 7.5 mg at 10/14/23 2147    LORazepam (ATIVAN) injection 0.25 mg, 0.25 mg, Intravenous, Q6H, Dana Draper MD, 0.25 mg at 10/15/23 0627    Magnesium Standard Dose Replacement - Follow Nurse / BPA Driven Protocol, , Does not apply, PRN, Charito Amaro MD    morphine injection 2 mg, 2 mg, Intravenous, Q4H PRN, Judy Lujan DO, 2 mg at 10/12/23 2158    ondansetron (ZOFRAN) injection 4 mg, 4 mg, Intravenous, Q6H PRN, Judy Lujan DO, 4 mg at 10/14/23 1613    Pharmacy to Dose TPN, , Does not apply, Continuous PRN, Dana Draper MD    phenol (CHLORASEPTIC) 1.4 % liquid 1 spray, 1 spray, Mouth/Throat, Q2H PRN, Dana Draper MD, 1 spray at 10/15/23 0443    Phosphorus Replacement - Follow Nurse / BPA Driven Protocol, , Does not apply, PRN, Charito Amaro MD    Potassium Replacement - Follow Nurse / BPA Driven Protocol, , Does not apply, PRN, Charito Amaro MD    sacubitril-valsartan (ENTRESTO) 24-26 MG tablet 1 tablet, 1 tablet, Nasogastric, BID, Charito Amaro MD, 1 tablet at 10/14/23 2148    sertraline (ZOLOFT) tablet 75 mg, 75 mg, Nasogastric, Daily, Charito Amaro MD, 75 mg at 10/14/23 0908    [COMPLETED] Insert Peripheral IV, , , Once **AND** sodium chloride 0.9 % flush 10 mL, 10 mL, Intravenous, PRN, Judy Lujan R, DO    sodium chloride 0.9 % flush 10 mL, 10 mL, Intravenous, Q12H, Judy Lujan, , 10 mL at 10/14/23 2148    sodium chloride 0.9 % flush 10 mL, 10 mL, Intravenous, PRN, Judy Lujan R, DO    sodium chloride 0.9 % flush 10 mL, 10 mL, Intravenous, Q12H,  "Dana Draper MD, 10 mL at 10/14/23 2148    sodium chloride 0.9 % flush 10 mL, 10 mL, Intravenous, PRN, Dana Draper MD    sodium chloride 0.9 % infusion 40 mL, 40 mL, Intravenous, PRN, Judy Lujan R, DO    spironolactone (ALDACTONE) tablet 25 mg, 25 mg, Nasogastric, Daily, Charito Amaro MD, 25 mg at 10/14/23 0908    Vital Sign Min/Max for last 24 hours  Temp  Min: 97.3 °F (36.3 °C)  Max: 98.5 °F (36.9 °C)   BP  Min: 111/60  Max: 127/71   Pulse  Min: 100  Max: 111   Resp  Min: 16  Max: 18   SpO2  Min: 94 %  Max: 95 %   No data recorded   Weight  Min: 58.6 kg (129 lb 3 oz)  Max: 59 kg (130 lb)     Flowsheet Rows      Flowsheet Row First Filed Value   Admission Height 154.9 cm (61\") Documented at 10/11/2023 1322   Admission Weight 60.3 kg (133 lb) Documented at 10/11/2023 1322            No intake or output data in the 24 hours ending 10/15/23 0743      Physical Exam:     General Appearance:    Alert, cooperative, in no acute distress. NG in placed   Lungs:     Clear to auscultation bilaterally,respirations regular, even and  unlabored    Heart:    Regular rhythm and mildly tachycardic rate, normal S1 and S2,   no        murmur, no gallop, no rub, no click   Chest Wall:    No abnormalities observed   Abdomen:     Normal bowel sounds, no masses, no organomegaly, soft      nontender, nondistended, no guarding, no rebound                tenderness   Extremities:  Moves all extremities well, no edema, no cyanosis, no            redness              Pulses:   Pulses palpable and equal bilaterally   Skin:   No bleeding, bruising or rash        Results Review:   Results from last 7 days   Lab Units 10/15/23  0405 10/14/23  0430 10/12/23  0416   WBC 10*3/mm3 9.65 10.79 9.53   HEMOGLOBIN g/dL 9.8* 9.8* 10.8*   HEMATOCRIT % 33.3* 31.7* 34.2   PLATELETS 10*3/mm3 332 358 450     Results from last 7 days   Lab Units 10/15/23  0508 10/14/23  2207 10/14/23  1255 10/14/23  0430   SODIUM mmol/L 144  --  146* 144 " "  POTASSIUM mmol/L 3.8 4.3 3.5 3.3*   CHLORIDE mmol/L 111*  --  107 108*   CO2 mmol/L 25.0  --  19.0* 21.0*   BUN mg/dL 32*  --  34* 37*   CREATININE mg/dL 0.92  --  0.99 0.94   GLUCOSE mg/dL 217*  --  152* 120*      Results from last 7 days   Lab Units 10/12/23  0416   HEMOGLOBIN A1C % 8.80*     Results from last 7 days   Lab Units 10/14/23  1255   CHOLESTEROL mg/dL 135   TRIGLYCERIDES mg/dL 192*         Results from last 7 days   Lab Units 10/11/23  1340   PROBNP pg/mL 1,889.0*     Results from last 7 days   Lab Units 10/11/23  1340   PROTIME Seconds 16.9*   INR  1.36*     Results from last 7 days   Lab Units 10/11/23  1340   HSTROP T ng/L 28*     No intake or output data in the 24 hours ending 10/15/23 0743      I personally viewed and interpreted the patient's EKG/Telemetry data    EKG: none for review today     Telemetry: AV paced  -111 bpm    Ejection Fraction  No results found for: \"EF\"    Echo EF Estimated  No results found for: \"ECHOEFEST\"      Present on Admission:   Ovarian mass   COPD (chronic obstructive pulmonary disease)   Chronic HFrEF (heart failure with reduced ejection fraction)   CKD (chronic kidney disease), stage III   Type 2 diabetes mellitus   SBO (small bowel obstruction)    Assessment & Plan   Nonischemic Cardiomyopathy:   - s/p BiV ICD ([facility] [place] followed at [facility]. Last interrogation 92% BiV paced.   - Echo 9/19/2023: EF 26%  - due to SBO, she has not been able to keep her medications down for the last 3 weeks. Just started back on Corlanor, Aldactone, and Entresto 10/13/2023 via NG tube.   - CHF appears to be compensated at this time. Overall moderate risk from a cardiovascular standpoint considering decreased EF, but compensated at this time. Patient is currently medically optimized for surgery ,but is still at risk for perioperative volume overload due to severe congestive heart failure. (cannot tolerate beta blockers, did explain to her that beta blockers are recommended, especially " chucho-operatively, but she states she has tried several and make her feel worse). Overall compensated at this time. Of note, recently, underwent procedure with GA and did well.   Sinus Tachycardia:   - reviewed EKG, shows sinus tachycardia, no atrial arrhythmias.   - restarted Corlanor 10/13/2023. Normal HR in 90s at home. Some improvement in HRs today. Cannot tolerate BBs (she states she has tried several and they make her more SOB)  3. SBO:  - management per primary team/surgery   - plans for OR today with Dr. Draper         Electronically signed by ROSA Villa, 10/15/23, 7:28 AM EDT.

## 2023-10-15 NOTE — ANESTHESIA POSTPROCEDURE EVALUATION
Patient: Iqra Britt    Procedure Summary       Date: 10/15/23 Room / Location:  [facility]  [facility]    Anesthesia Start: 0929 Anesthesia Stop: 1157    Procedure: LAPAROTOMY EXPLORATORY, RIGHT SALPINGO OOPHORECTOMY, LYSIS OF ADHESIONS, DESCENDING COLOSOTOMY (Abdomen) Diagnosis:       SBO (small bowel obstruction)      (SBO (small bowel obstruction) [K56.609])    Surgeons: Dana Draper MD Provider: Clay Bowen MD    Anesthesia Type: general ASA Status: 4            Anesthesia Type: general    Vitals  Vitals Value Taken Time   BP     Temp     Pulse 101 10/15/23 1155   Resp     SpO2 100 % 10/15/23 1155   Vitals shown include unfiled device data.        Post Anesthesia Care and Evaluation    Patient location during evaluation: PACU  Patient participation: complete - patient participated  Level of consciousness: awake and alert  Pain management: adequate    Airway patency: patent  Anesthetic complications: No anesthetic complications  PONV Status: none  Cardiovascular status: hemodynamically stable and acceptable  Respiratory status: nonlabored ventilation, acceptable and nasal cannula  Hydration status: acceptable

## 2023-10-15 NOTE — SIGNIFICANT NOTE
10/15/23 1247   SLP Deferred Reason   SLP Deferred Reason   (Pt in OR for scheluded procedure. Will f/u for dysphagia evalaution when appropriate.)

## 2023-10-15 NOTE — PROGRESS NOTES
Gynecologic Oncology   Daily Progress Note    Chief Complaint: s/p ex lap, colostomy, RSO    Subjective   Patient did OK. Bps low last night so has been maxed out on phenylephrine.  Pain is improved on dilaudid but is currently in pain.  She is not having nausea currently.  She is NPO with TPN running and HGT in place. Khan in place with inadequate urine.          Inpatient Medications:     Current Facility-Administered Medications:     acetaminophen (TYLENOL) tablet 650 mg, 650 mg, Nasogastric, Q4H PRN, Dana Draper MD    Adult Central 2-in-1 TPN, , Intravenous, Continuous, Popeye Reyna, PharmD    Calcium Replacement - Follow Nurse / BPA Driven Protocol, , Does not apply, PRN, Dana Draper MD    dextrose (D50W) (25 g/50 mL) IV injection 25 g, 25 g, Intravenous, Q15 Min PRN, Dana Draper MD    dextrose (GLUTOSE) oral gel 15 g, 15 g, Oral, Q15 Min PRN, Dana Draper MD    dextrose 10 % infusion, 60 mL/hr, Intravenous, Continuous, Jg Grijalva PharmD, Last Rate: 60 mL/hr at 10/15/23 1553, 60 mL/hr at 10/15/23 1553    donepezil (ARICEPT) tablet 5 mg, 5 mg, Nasogastric, Nightly, Dana Draper MD, 5 mg at 10/14/23 2148    famotidine (PEPCID) injection 20 mg, 20 mg, Intravenous, Daily, Dana rDaper MD, 20 mg at 10/14/23 0908    Fat Emul Fish Oil/Plant Based (SMOFLIPID) 20 % emulsion 250 mL, 250 mL, Intravenous, Q24H (TPN), Dana Drapre MD, Last Rate: 20.8 mL/hr at 10/14/23 1758, 250 mL at 10/14/23 1758    folic acid 1 mg in sodium chloride 0.9 % 50 mL IVPB, 1 mg, Intravenous, Daily, Jg Grijalva PharmD, Last Rate: 100 mL/hr at 10/15/23 1552, 1 mg at 10/15/23 1552    glucagon (GLUCAGEN) injection 1 mg, 1 mg, Intramuscular, Q15 Min PRN, Dana Draper MD    heparin (porcine) 5000 UNIT/ML injection 5,000 Units, 5,000 Units, Subcutaneous, Q8H, Dana Draper MD, 5,000 Units at 10/15/23 1552    hydrALAZINE (APRESOLINE) injection 10 mg, 10 mg, Intravenous, Q6H PRN,  Dana Draper MD    HYDROmorphone (DILAUDID) injection 0.25 mg, 0.25 mg, Intravenous, Q2H PRN, Dana Draper MD, 0.25 mg at 10/15/23 1456    insulin detemir (LEVEMIR) injection 8 Units, 8 Units, Subcutaneous, Nightly, Dana Draper MD    insulin regular (humuLIN R,novoLIN R) injection 3-14 Units, 3-14 Units, Subcutaneous, Q6H, Dana Draper MD, 5 Units at 10/15/23 0626    ipratropium-albuterol (DUO-NEB) nebulizer solution 3 mL, 3 mL, Nebulization, Q4H PRN, Dana Draper MD    ivabradine HCl (CORLANOR) tablet 7.5 mg, 7.5 mg, Nasogastric, BID With Meals, Dana Draper MD, 7.5 mg at 10/14/23 2147    LORazepam (ATIVAN) injection 0.25 mg, 0.25 mg, Intravenous, Q6H, Dana Draper MD, 0.25 mg at 10/15/23 0627    Magnesium Standard Dose Replacement - Follow Nurse / BPA Driven Protocol, , Does not apply, PRN, Dana Draper MD    ondansetron (ZOFRAN) injection 4 mg, 4 mg, Intravenous, Q6H PRN, Dana Draper MD, 4 mg at 10/14/23 1613    Pharmacy to Dose TPN, , Does not apply, Continuous PRN, Dana Draper MD    phenol (CHLORASEPTIC) 1.4 % liquid 1 spray, 1 spray, Mouth/Throat, Q2H PRN, Dana Draper MD, 1 spray at 10/15/23 0443    phenylephrine (DARIAN-SYNEPHRINE) 10 MG/ML injection  - ADS Override Pull, , , ,     phenylephrine (DARIAN-SYNEPHRINE) 50 mg in sodium chloride 0.9 % 250 mL infusion, 0.5-3 mcg/kg/min, Intravenous, Titrated, Shani Oshea APRCHAR, Last Rate: 17.7 mL/hr at 10/15/23 1447, 1 mcg/kg/min at 10/15/23 1447    Phosphorus Replacement - Follow Nurse / BPA Driven Protocol, , Does not apply, PRN, Dana Draper MD    Potassium Replacement - Follow Nurse / BPA Driven Protocol, , Does not apply, PRN, Dana Draper MD    sacubitril-valsartan (ENTRESTO) 24-26 MG tablet 1 tablet, 1 tablet, Nasogastric, BID, Dana Draper MD, 1 tablet at 10/14/23 2148    sertraline (ZOLOFT) tablet 75 mg, 75 mg, Nasogastric, Daily, Dana Draper MD, 75 mg at 10/14/23  0908    [COMPLETED] Insert Peripheral IV, , , Once **AND** sodium chloride 0.9 % flush 10 mL, 10 mL, Intravenous, PRN, Dana Draper MD    sodium chloride 0.9 % flush 10 mL, 10 mL, Intravenous, Q12H, Dana Draper MD, 10 mL at 10/14/23 2148    sodium chloride 0.9 % flush 10 mL, 10 mL, Intravenous, PRN, Dana Draper MD    sodium chloride 0.9 % infusion 40 mL, 40 mL, Intravenous, PRN, Dana Draper MD    spironolactone (ALDACTONE) tablet 25 mg, 25 mg, Nasogastric, Daily, Dana Draper MD, 25 mg at 10/14/23 0908    thiamine (B-1) injection 100 mg, 100 mg, Intravenous, Daily, Jg Grijalva PharmD, 100 mg at 10/15/23 1553     Objective   Temp:  [96.5 °F (35.8 °C)-98.5 °F (36.9 °C)] 97.1 °F (36.2 °C)  Heart Rate:  [] 109  Resp:  [16-19] 16  BP: ()/(32-74) 119/70  Arterial Line BP: ()/(38-43) 120/42  Vitals:    10/15/23 1500   BP: 119/70   Pulse: 109   Resp:    Temp:    SpO2: 100%     I/O last 3 completed shifts:  In: -   Out: 550 [Urine:550]                 GENERAL: Alert, well-appearing female in no apparent distress.    CARDIOVASCULAR: Normal rate, regular rhythm, no murmurs, rubs, or gallops.    RESPIRATORY: Clear to auscultation bilaterally, normal respiratory effort  GASTROINTESTINAL:  tender throughout, distended, guarding. quiet bowel sounds.  Incision(s) covered with bandage. Colostomy bag with 15cc. Ostomy site pink and viable  GENITOURINARY: Khan in place  SKIN:  Warm, dry, well-perfused.    PSYCHIATRIC: AO x3, with appropriate affect, normal thought processes  EXREMITIES: Symmetric. No peripheral edema.    Lab Results   Component Value Date    WBC 9.65 10/15/2023    HGB 8.7 (L) 10/15/2023    HCT 29.0 (L) 10/15/2023    MCV 87.6 10/15/2023     10/15/2023    NEUTROABS 8.67 (H) 10/12/2023    GLUCOSE 217 (H) 10/15/2023    BUN 32 (H) 10/15/2023    CREATININE 0.92 10/15/2023    EGFRIFNONA 34 (L) 05/02/2022    EGFRIFAFRI 41 (L) 05/02/2022     10/15/2023     K 4.4 10/15/2023     (H) 10/15/2023    CO2 25.0 10/15/2023    MG 2.4 10/15/2023    CALCIUM 8.3 (L) 10/15/2023         Assessment & Plan   Iqra Britt is a 72 y.o. female POD1 s/p ex lap, RSO, lysis of adhesions, descending colostomy on 10/15 in setting of SBO and new pelvic mass    1.  Post-operative care  -Routine care (IS use, continue IVF, VTE prophylaxis)  -continue current pain control regimen  -continue NPO with TPN and NGT  -pathology pending from surgery, cytology pending from paracentesis   -continue pressors as needed by ICU team  -continue strict Is and Os  -Hb 8.7-7.7; transfuse 1u pRBC   -leukocytosis this AM likely postoperative    2. Malnutrition  -continue NGT, PICC and TPN  -pre-albumin today    3.  Significant comorbidities  -FIORDALIZA in the context of CKD stage III; Cr 1.15 this AM  -CHF, seen by cardiology.  Moderate risk for surgical intervention    -Type 2 diabetes mellitus  -COPD    4.   Disposition  -Continue ICU care. Rest of care per primary team         Alexandria Puente MD  10/15/23  16:39 EDT      I saw and evaluated the patient. I agree with the findings and the plan of care as documented in the note.  I have seen the patient twice today.  I saw her this morning around 9 AM.  At that time, her abdomen was distended and soft.  Palpable fullness right lower quadrant which I think is her dilated cecum.  Ostomy was viable with minimal stool output.  There were positive but minimal bowel sounds.  Patient was complaining of normal postoperative pain.  She was still on pressors and had had suboptimal urine output for the night.  Due to hemoglobin, 1 unit packed red blood cells was ordered.    I went back to reevaluate the patient around 3:30.  She was off pressors and remained tachycardic which was not new for this hospital stay.  Hemoglobin had dropped to 7.0 and a second unit of blood was hung.  Abdomen continued to be distended, but soft.  Abdominal exam was varied with  patient having pain with rebound initially followed by her not having any peritoneal signs.  Son was at the bedside.  I discussed my concern with him (Will) and her other son via speaker phone.  Although patient's H/H had not changed with blood transfusion, her urine output had improved through the day and she was off pressors.  She continued to be tachycardic.  I recommended waiting for further laboratory studies and take those in consideration with her clinical course.  We discussed the difference between arterial and venous bleeding.  I discussed limitations of CT scan in her particular case given recurrent ascites.  I also reviewed the patient's clinical course in detail with my partner, Dr. Gómez, his covering until 10/18/2023.    Dana Draper MD  10/16/23  18:12 EDT

## 2023-10-15 NOTE — ANESTHESIA PROCEDURE NOTES
"Peripheral Block      Patient reassessed immediately prior to procedure    Patient location during procedure: OR  Reason for block: at surgeon's request and post-op pain management  Performed by  Anesthesiologist: Clay Bowen MD  Preanesthetic Checklist  Completed: patient identified, IV checked, site marked, risks and benefits discussed, surgical consent, monitors and equipment checked, pre-op evaluation and timeout performed  Prep:  Pt Position: supine  Sterile barriers:cap, gloves, mask and washed/disinfected hands  Prep: ChloraPrep  Patient monitoring: blood pressure monitoring, continuous pulse oximetry and EKG  Procedure    Sedation: yes  Performed under: general  Guidance:ultrasound guided  Images:still images obtained, printed/placed on chart    Laterality:Bilateral  Block Type:TAP  Injection Technique:single-shot  Needle Type:short-bevel and echogenic  Needle Gauge:20 G  Resistance on Injection: none    Medications Used: dexamethasone sodium phosphate injection - Injection   4 mg - 10/15/2023 9:43:00 AM  bupivacaine PF (MARCAINE) 0.25 % injection - Injection   55 mL - 10/15/2023 9:43:00 AM      Medications  Comment:Block Injection:  LA dose divided between Right and Left block        Post Assessment  Injection Assessment: negative aspiration for heme, incremental injection and no paresthesia on injection  Patient Tolerance:comfortable throughout block  Complications:no  Additional Notes    Mid-Axillary/Lateral TAPs    A high-frequency linear transducer, with sterile cover, was placed in the midaxillary line between the ASIS and costal margin. The External Oblique Muscle (EOM), Internal Oblique Muscle (IOM), Transverse Abdominus Muscle (MONTENEGRO), and Peritoneum were identified. The insertion site was prepped in sterile fashion and then localized with 2-5 ml of 1% Lidocaine. Using ultrasound-guidance, a 20-gauge B-Rodríguez 4\" Ultraplex 360 non-stimulating echogenic needle was advanced in plane, from medial to " lateral, until the tip of the needle was in the fascial plane between the IOM and MONTENEGRO. 1-3ml of preservative free normal saline was used to hydro-dissect the fascial planes. After the fascial plane was verified, the local anesthetic (LA) was injected. The procedure was repeated on the opposite side for bilateral coverage. Aspiration every 5 ml to prevent intravascular injection. Injection was completed with negative aspiration of blood and negative intravascular injection. Injection pressures were normal with minimal resistance. Midaxillary TAPs should reach intercostal nerves T10- T11 and the subcostal nerve T12.

## 2023-10-16 LAB
ALBUMIN SERPL-MCNC: 2 G/DL (ref 3.5–5.2)
ALBUMIN SERPL-MCNC: 2.1 G/DL (ref 3.5–5.2)
ALBUMIN/GLOB SERPL: 0.7 G/DL
ALBUMIN/GLOB SERPL: 1 G/DL
ALP SERPL-CCNC: 30 U/L (ref 39–117)
ALP SERPL-CCNC: 45 U/L (ref 39–117)
ALT SERPL W P-5'-P-CCNC: 16 U/L (ref 1–33)
ALT SERPL W P-5'-P-CCNC: 9 U/L (ref 1–33)
ANION GAP SERPL CALCULATED.3IONS-SCNC: 11 MMOL/L (ref 5–15)
ANION GAP SERPL CALCULATED.3IONS-SCNC: 9 MMOL/L (ref 5–15)
ARTERIAL PATENCY WRIST A: ABNORMAL
AST SERPL-CCNC: 22 U/L (ref 1–32)
AST SERPL-CCNC: 59 U/L (ref 1–32)
ATMOSPHERIC PRESS: ABNORMAL MM[HG]
BASE EXCESS BLDA CALC-SCNC: -2 MMOL/L (ref -5–5)
BASE EXCESS BLDA CALC-SCNC: -4.9 MMOL/L (ref 0–2)
BASE EXCESS BLDA CALC-SCNC: 0 MMOL/L (ref -5–5)
BASOPHILS # BLD AUTO: 0.02 10*3/MM3 (ref 0–0.2)
BASOPHILS NFR BLD AUTO: 0.1 % (ref 0–1.5)
BDY SITE: ABNORMAL
BILIRUB CONJ SERPL-MCNC: 0.2 MG/DL (ref 0–0.3)
BILIRUB SERPL-MCNC: 0.2 MG/DL (ref 0–1.2)
BILIRUB SERPL-MCNC: 0.6 MG/DL (ref 0–1.2)
BODY TEMPERATURE: 37 C
BUN SERPL-MCNC: 34 MG/DL (ref 8–23)
BUN SERPL-MCNC: 38 MG/DL (ref 8–23)
BUN/CREAT SERPL: 33 (ref 7–25)
BUN/CREAT SERPL: 37 (ref 7–25)
CA-I BLDA-SCNC: 1.11 MMOL/L (ref 1.2–1.32)
CA-I BLDA-SCNC: 1.16 MMOL/L (ref 1.2–1.32)
CA-I SERPL ISE-MCNC: 1.07 MMOL/L (ref 1.12–1.32)
CALCIUM SPEC-SCNC: 6.2 MG/DL (ref 8.6–10.5)
CALCIUM SPEC-SCNC: 6.9 MG/DL (ref 8.6–10.5)
CHLORIDE SERPL-SCNC: 116 MMOL/L (ref 98–107)
CHLORIDE SERPL-SCNC: 123 MMOL/L (ref 98–107)
CO2 BLDA-SCNC: 20.9 MMOL/L (ref 22–33)
CO2 BLDA-SCNC: 24 MMOL/L (ref 24–29)
CO2 BLDA-SCNC: 26 MMOL/L (ref 24–29)
CO2 SERPL-SCNC: 16 MMOL/L (ref 22–29)
CO2 SERPL-SCNC: 17 MMOL/L (ref 22–29)
COHGB MFR BLD: 1.9 % (ref 0–2)
CORTIS SERPL-MCNC: 47.61 MCG/DL
CREAT SERPL-MCNC: 0.92 MG/DL (ref 0.57–1)
CREAT SERPL-MCNC: 1.15 MG/DL (ref 0.57–1)
CRP SERPL-MCNC: 13.36 MG/DL (ref 0–0.5)
D-LACTATE SERPL-SCNC: 1.2 MMOL/L (ref 0.5–2)
D-LACTATE SERPL-SCNC: 2.7 MMOL/L (ref 0.5–2)
DEPRECATED RDW RBC AUTO: 50.6 FL (ref 37–54)
DEPRECATED RDW RBC AUTO: 51.5 FL (ref 37–54)
EGFRCR SERPLBLD CKD-EPI 2021: 50.7 ML/MIN/1.73
EGFRCR SERPLBLD CKD-EPI 2021: 66.3 ML/MIN/1.73
EOSINOPHIL # BLD AUTO: 0.01 10*3/MM3 (ref 0–0.4)
EOSINOPHIL NFR BLD AUTO: 0.1 % (ref 0.3–6.2)
EPAP: 0
ERYTHROCYTE [DISTWIDTH] IN BLOOD BY AUTOMATED COUNT: 16.4 % (ref 12.3–15.4)
ERYTHROCYTE [DISTWIDTH] IN BLOOD BY AUTOMATED COUNT: 16.4 % (ref 12.3–15.4)
GLOBULIN UR ELPH-MCNC: 2.2 GM/DL
GLOBULIN UR ELPH-MCNC: 2.7 GM/DL
GLUCOSE BLDC GLUCOMTR-MCNC: 203 MG/DL (ref 70–130)
GLUCOSE BLDC GLUCOMTR-MCNC: 212 MG/DL (ref 70–130)
GLUCOSE BLDC GLUCOMTR-MCNC: 212 MG/DL (ref 70–130)
GLUCOSE BLDC GLUCOMTR-MCNC: 237 MG/DL (ref 70–130)
GLUCOSE BLDC GLUCOMTR-MCNC: 255 MG/DL (ref 70–130)
GLUCOSE SERPL-MCNC: 211 MG/DL (ref 65–99)
GLUCOSE SERPL-MCNC: 268 MG/DL (ref 65–99)
HCO3 BLDA-SCNC: 19.9 MMOL/L (ref 20–26)
HCO3 BLDA-SCNC: 23 MMOL/L (ref 22–26)
HCO3 BLDA-SCNC: 24.6 MMOL/L (ref 22–26)
HCT VFR BLD AUTO: 22.6 % (ref 34–46.6)
HCT VFR BLD AUTO: 25.3 % (ref 34–46.6)
HCT VFR BLD AUTO: 25.3 % (ref 34–46.6)
HCT VFR BLD AUTO: 29 % (ref 34–46.6)
HCT VFR BLD CALC: 22.1 % (ref 38–51)
HCT VFR BLDA CALC: 26 % (ref 38–51)
HCT VFR BLDA CALC: 27 % (ref 38–51)
HGB BLD-MCNC: 7 G/DL (ref 12–15.9)
HGB BLD-MCNC: 7.4 G/DL (ref 12–15.9)
HGB BLD-MCNC: 7.7 G/DL (ref 12–15.9)
HGB BLD-MCNC: 8.9 G/DL (ref 12–15.9)
HGB BLDA-MCNC: 7.2 G/DL (ref 14–18)
HGB BLDA-MCNC: 8.8 G/DL (ref 12–17)
HGB BLDA-MCNC: 9.2 G/DL (ref 12–17)
IMM GRANULOCYTES # BLD AUTO: 0.22 10*3/MM3 (ref 0–0.05)
IMM GRANULOCYTES NFR BLD AUTO: 1.6 % (ref 0–0.5)
INHALED O2 CONCENTRATION: 28 %
INR PPP: 1.48 (ref 0.89–1.12)
IPAP: 0
LYMPHOCYTES # BLD AUTO: 0.5 10*3/MM3 (ref 0.7–3.1)
LYMPHOCYTES NFR BLD AUTO: 3.5 % (ref 19.6–45.3)
MAGNESIUM SERPL-MCNC: 2 MG/DL (ref 1.6–2.4)
MCH RBC QN AUTO: 26.3 PG (ref 26.6–33)
MCH RBC QN AUTO: 26.6 PG (ref 26.6–33)
MCHC RBC AUTO-ENTMCNC: 30.4 G/DL (ref 31.5–35.7)
MCHC RBC AUTO-ENTMCNC: 30.7 G/DL (ref 31.5–35.7)
MCV RBC AUTO: 85.8 FL (ref 79–97)
MCV RBC AUTO: 87.2 FL (ref 79–97)
METHGB BLD QL: 0.6 % (ref 0–1.5)
MODALITY: ABNORMAL
MONOCYTES # BLD AUTO: 1.08 10*3/MM3 (ref 0.1–0.9)
MONOCYTES NFR BLD AUTO: 7.6 % (ref 5–12)
NEUTROPHILS NFR BLD AUTO: 12.33 10*3/MM3 (ref 1.7–7)
NEUTROPHILS NFR BLD AUTO: 87.1 % (ref 42.7–76)
NRBC BLD AUTO-RTO: 0 /100 WBC (ref 0–0.2)
OXYHGB MFR BLDV: 95.1 % (ref 94–99)
PAW @ PEAK INSP FLOW SETTING VENT: 0 CMH2O
PCO2 BLDA: 34.4 MM HG (ref 35–45)
PCO2 BLDA: 37.9 MM HG (ref 35–45)
PCO2 BLDA: 38.6 MM HG (ref 35–45)
PCO2 TEMP ADJ BLD: 34.4 MM HG (ref 35–45)
PH BLDA: 7.37 PH UNITS (ref 7.35–7.45)
PH BLDA: 7.39 PH UNITS (ref 7.35–7.6)
PH BLDA: 7.41 PH UNITS (ref 7.35–7.6)
PH, TEMP CORRECTED: 7.37 PH UNITS
PHOSPHATE SERPL-MCNC: 2.1 MG/DL (ref 2.5–4.5)
PHOSPHATE SERPL-MCNC: 2.3 MG/DL (ref 2.5–4.5)
PLATELET # BLD AUTO: 194 10*3/MM3 (ref 140–450)
PLATELET # BLD AUTO: 341 10*3/MM3 (ref 140–450)
PMV BLD AUTO: 10.6 FL (ref 6–12)
PMV BLD AUTO: 11 FL (ref 6–12)
PO2 BLDA: 482 MMHG (ref 80–105)
PO2 BLDA: 498 MMHG (ref 80–105)
PO2 BLDA: 85.6 MM HG (ref 83–108)
PO2 TEMP ADJ BLD: 85.6 MM HG (ref 83–108)
POTASSIUM BLDA-SCNC: 3.6 MMOL/L (ref 3.5–4.9)
POTASSIUM BLDA-SCNC: 4.3 MMOL/L (ref 3.5–4.9)
POTASSIUM SERPL-SCNC: 3.9 MMOL/L (ref 3.5–5.2)
POTASSIUM SERPL-SCNC: 4.7 MMOL/L (ref 3.5–5.2)
PREALB SERPL-MCNC: 5 MG/DL (ref 20–40)
PROT SERPL-MCNC: 4.3 G/DL (ref 6–8.5)
PROT SERPL-MCNC: 4.7 G/DL (ref 6–8.5)
PROTHROMBIN TIME: 18 SECONDS (ref 12.2–14.5)
RBC # BLD AUTO: 2.9 10*6/MM3 (ref 3.77–5.28)
RBC # BLD AUTO: 3.38 10*6/MM3 (ref 3.77–5.28)
REF LAB TEST METHOD: NORMAL
SAO2 % BLDA: 100 % (ref 95–98)
SAO2 % BLDA: 100 % (ref 95–98)
SODIUM BLD-SCNC: 143 MMOL/L (ref 138–146)
SODIUM BLD-SCNC: 145 MMOL/L (ref 138–146)
SODIUM SERPL-SCNC: 144 MMOL/L (ref 136–145)
SODIUM SERPL-SCNC: 148 MMOL/L (ref 136–145)
TOTAL RATE: 0 BREATHS/MINUTE
TRIGL SERPL-MCNC: 157 MG/DL (ref 0–150)
WBC NRBC COR # BLD: 14.16 10*3/MM3 (ref 3.4–10.8)
WBC NRBC COR # BLD: 18.21 10*3/MM3 (ref 3.4–10.8)

## 2023-10-16 PROCEDURE — 86900 BLOOD TYPING SEROLOGIC ABO: CPT

## 2023-10-16 PROCEDURE — 82948 REAGENT STRIP/BLOOD GLUCOSE: CPT

## 2023-10-16 PROCEDURE — 99291 CRITICAL CARE FIRST HOUR: CPT | Performed by: INTERNAL MEDICINE

## 2023-10-16 PROCEDURE — 84478 ASSAY OF TRIGLYCERIDES: CPT | Performed by: OBSTETRICS & GYNECOLOGY

## 2023-10-16 PROCEDURE — 83050 HGB METHEMOGLOBIN QUAN: CPT

## 2023-10-16 PROCEDURE — 25010000002 PHENYLEPHRINE 10 MG/ML SOLUTION 5 ML VIAL

## 2023-10-16 PROCEDURE — 84134 ASSAY OF PREALBUMIN: CPT | Performed by: OBSTETRICS & GYNECOLOGY

## 2023-10-16 PROCEDURE — P9040 RBC LEUKOREDUCED IRRADIATED: HCPCS

## 2023-10-16 PROCEDURE — 25810000003 SODIUM CHLORIDE 0.9 % SOLUTION 250 ML FLEX CONT

## 2023-10-16 PROCEDURE — 82248 BILIRUBIN DIRECT: CPT | Performed by: OBSTETRICS & GYNECOLOGY

## 2023-10-16 PROCEDURE — 84100 ASSAY OF PHOSPHORUS: CPT | Performed by: OBSTETRICS & GYNECOLOGY

## 2023-10-16 PROCEDURE — 25010000002 MAGNESIUM SULFATE PER 500 MG OF MAGNESIUM

## 2023-10-16 PROCEDURE — 25010000002 HEPARIN (PORCINE) PER 1000 UNITS: Performed by: OBSTETRICS & GYNECOLOGY

## 2023-10-16 PROCEDURE — 83605 ASSAY OF LACTIC ACID: CPT | Performed by: NURSE PRACTITIONER

## 2023-10-16 PROCEDURE — 25010000002 HYDROMORPHONE PER 4 MG: Performed by: INTERNAL MEDICINE

## 2023-10-16 PROCEDURE — 25010000002 ALBUMIN HUMAN 5% PER 50 ML: Performed by: NURSE PRACTITIONER

## 2023-10-16 PROCEDURE — 25010000002 ONDANSETRON PER 1 MG: Performed by: OBSTETRICS & GYNECOLOGY

## 2023-10-16 PROCEDURE — 85025 COMPLETE CBC W/AUTO DIFF WBC: CPT | Performed by: OBSTETRICS & GYNECOLOGY

## 2023-10-16 PROCEDURE — 97530 THERAPEUTIC ACTIVITIES: CPT

## 2023-10-16 PROCEDURE — 82805 BLOOD GASES W/O2 SATURATION: CPT

## 2023-10-16 PROCEDURE — 80053 COMPREHEN METABOLIC PANEL: CPT | Performed by: OBSTETRICS & GYNECOLOGY

## 2023-10-16 PROCEDURE — 25010000002 CEFTRIAXONE PER 250 MG: Performed by: NURSE PRACTITIONER

## 2023-10-16 PROCEDURE — 82533 TOTAL CORTISOL: CPT | Performed by: NURSE PRACTITIONER

## 2023-10-16 PROCEDURE — P9041 ALBUMIN (HUMAN),5%, 50ML: HCPCS | Performed by: NURSE PRACTITIONER

## 2023-10-16 PROCEDURE — P9016 RBC LEUKOCYTES REDUCED: HCPCS

## 2023-10-16 PROCEDURE — 99232 SBSQ HOSP IP/OBS MODERATE 35: CPT | Performed by: INTERNAL MEDICINE

## 2023-10-16 PROCEDURE — 85027 COMPLETE CBC AUTOMATED: CPT | Performed by: OBSTETRICS & GYNECOLOGY

## 2023-10-16 PROCEDURE — 85610 PROTHROMBIN TIME: CPT | Performed by: OBSTETRICS & GYNECOLOGY

## 2023-10-16 PROCEDURE — 99024 POSTOP FOLLOW-UP VISIT: CPT | Performed by: OBSTETRICS & GYNECOLOGY

## 2023-10-16 PROCEDURE — 63710000001 INSULIN DETEMIR PER 5 UNITS: Performed by: INTERNAL MEDICINE

## 2023-10-16 PROCEDURE — 25810000003 SODIUM CHLORIDE 0.9 % SOLUTION: Performed by: NURSE PRACTITIONER

## 2023-10-16 PROCEDURE — 25010000002 THIAMINE PER 100 MG

## 2023-10-16 PROCEDURE — 86140 C-REACTIVE PROTEIN: CPT | Performed by: OBSTETRICS & GYNECOLOGY

## 2023-10-16 PROCEDURE — 87040 BLOOD CULTURE FOR BACTERIA: CPT | Performed by: NURSE PRACTITIONER

## 2023-10-16 PROCEDURE — 85018 HEMOGLOBIN: CPT | Performed by: NURSE PRACTITIONER

## 2023-10-16 PROCEDURE — 84100 ASSAY OF PHOSPHORUS: CPT | Performed by: NURSE PRACTITIONER

## 2023-10-16 PROCEDURE — 25010000002 METRONIDAZOLE 500 MG/100ML SOLUTION: Performed by: NURSE PRACTITIONER

## 2023-10-16 PROCEDURE — 83735 ASSAY OF MAGNESIUM: CPT | Performed by: OBSTETRICS & GYNECOLOGY

## 2023-10-16 PROCEDURE — 97164 PT RE-EVAL EST PLAN CARE: CPT

## 2023-10-16 PROCEDURE — 36430 TRANSFUSION BLD/BLD COMPNT: CPT

## 2023-10-16 PROCEDURE — 63710000001 INSULIN REGULAR HUMAN PER 5 UNITS: Performed by: OBSTETRICS & GYNECOLOGY

## 2023-10-16 PROCEDURE — 25010000002 CALCIUM GLUCONATE PER 10 ML

## 2023-10-16 PROCEDURE — 25010000002 POTASSIUM CHLORIDE PER 2 MEQ OF POTASSIUM

## 2023-10-16 PROCEDURE — 80053 COMPREHEN METABOLIC PANEL: CPT | Performed by: NURSE PRACTITIONER

## 2023-10-16 PROCEDURE — 82375 ASSAY CARBOXYHB QUANT: CPT

## 2023-10-16 PROCEDURE — 85014 HEMATOCRIT: CPT | Performed by: NURSE PRACTITIONER

## 2023-10-16 PROCEDURE — 25810000003 SODIUM CHLORIDE 0.9 % SOLUTION

## 2023-10-16 PROCEDURE — 82330 ASSAY OF CALCIUM: CPT | Performed by: OBSTETRICS & GYNECOLOGY

## 2023-10-16 RX ORDER — FENTANYL/ROPIVACAINE/NS/PF 2-625MCG/1
15 PLASTIC BAG, INJECTION (ML) EPIDURAL ONCE
Status: COMPLETED | OUTPATIENT
Start: 2023-10-16 | End: 2023-10-16

## 2023-10-16 RX ORDER — ALBUMIN, HUMAN INJ 5% 5 %
500 SOLUTION INTRAVENOUS
Qty: 1000 ML | Refills: 0 | Status: DISPENSED | OUTPATIENT
Start: 2023-10-16 | End: 2023-10-16

## 2023-10-16 RX ORDER — SODIUM CHLORIDE 9 MG/ML
100 INJECTION, SOLUTION INTRAVENOUS CONTINUOUS
Status: DISCONTINUED | OUTPATIENT
Start: 2023-10-16 | End: 2023-10-16

## 2023-10-16 RX ORDER — METRONIDAZOLE 500 MG/100ML
500 INJECTION, SOLUTION INTRAVENOUS EVERY 8 HOURS
Qty: 2100 ML | Refills: 0 | Status: DISCONTINUED | OUTPATIENT
Start: 2023-10-16 | End: 2023-10-20 | Stop reason: HOSPADM

## 2023-10-16 RX ADMIN — IVABRADINE 7.5 MG: 5 TABLET, FILM COATED ORAL at 17:44

## 2023-10-16 RX ADMIN — IVABRADINE 7.5 MG: 5 TABLET, FILM COATED ORAL at 09:03

## 2023-10-16 RX ADMIN — Medication 10 ML: at 09:03

## 2023-10-16 RX ADMIN — INSULIN HUMAN 5 UNITS: 100 INJECTION, SOLUTION PARENTERAL at 11:40

## 2023-10-16 RX ADMIN — SODIUM CHLORIDE 100 ML/HR: 9 INJECTION, SOLUTION INTRAVENOUS at 10:55

## 2023-10-16 RX ADMIN — HEPARIN SODIUM 5000 UNITS: 5000 INJECTION INTRAVENOUS; SUBCUTANEOUS at 05:12

## 2023-10-16 RX ADMIN — PHENYLEPHRINE HYDROCHLORIDE 2.2 MCG/KG/MIN: 10 INJECTION INTRAVENOUS at 11:23

## 2023-10-16 RX ADMIN — HYDROMORPHONE HYDROCHLORIDE 0.25 MG: 1 INJECTION, SOLUTION INTRAMUSCULAR; INTRAVENOUS; SUBCUTANEOUS at 13:46

## 2023-10-16 RX ADMIN — INSULIN HUMAN 8 UNITS: 100 INJECTION, SOLUTION PARENTERAL at 06:41

## 2023-10-16 RX ADMIN — Medication 10 ML: at 20:18

## 2023-10-16 RX ADMIN — METRONIDAZOLE 500 MG: 500 INJECTION, SOLUTION INTRAVENOUS at 06:35

## 2023-10-16 RX ADMIN — FAMOTIDINE 20 MG: 10 INJECTION, SOLUTION INTRAVENOUS at 09:02

## 2023-10-16 RX ADMIN — ALBUMIN (HUMAN) 500 ML: 12.5 INJECTION, SOLUTION INTRAVENOUS at 03:34

## 2023-10-16 RX ADMIN — SACUBITRIL AND VALSARTAN 1 TABLET: 24; 26 TABLET, FILM COATED ORAL at 09:11

## 2023-10-16 RX ADMIN — PHENYLEPHRINE HYDROCHLORIDE 3 MCG/KG/MIN: 10 INJECTION INTRAVENOUS at 06:34

## 2023-10-16 RX ADMIN — HYDROMORPHONE HYDROCHLORIDE 0.25 MG: 1 INJECTION, SOLUTION INTRAMUSCULAR; INTRAVENOUS; SUBCUTANEOUS at 07:50

## 2023-10-16 RX ADMIN — POTASSIUM PHOSPHATE, MONOBASIC POTASSIUM PHOSPHATE, DIBASIC 15 MMOL: 224; 236 INJECTION, SOLUTION, CONCENTRATE INTRAVENOUS at 08:57

## 2023-10-16 RX ADMIN — PHENYLEPHRINE HYDROCHLORIDE 3 MCG/KG/MIN: 10 INJECTION INTRAVENOUS at 01:45

## 2023-10-16 RX ADMIN — THIAMINE HYDROCHLORIDE 100 MG: 100 INJECTION, SOLUTION INTRAMUSCULAR; INTRAVENOUS at 09:02

## 2023-10-16 RX ADMIN — DONEPEZIL HYDROCHLORIDE 5 MG: 5 TABLET, FILM COATED ORAL at 20:18

## 2023-10-16 RX ADMIN — SODIUM CHLORIDE 1000 MG: 900 INJECTION INTRAVENOUS at 05:09

## 2023-10-16 RX ADMIN — SPIRONOLACTONE 25 MG: 25 TABLET ORAL at 09:02

## 2023-10-16 RX ADMIN — METRONIDAZOLE 500 MG: 500 INJECTION, SOLUTION INTRAVENOUS at 12:58

## 2023-10-16 RX ADMIN — INSULIN HUMAN 5 UNITS: 100 INJECTION, SOLUTION PARENTERAL at 17:47

## 2023-10-16 RX ADMIN — SERTRALINE HYDROCHLORIDE 75 MG: 25 TABLET ORAL at 09:02

## 2023-10-16 RX ADMIN — HYDROMORPHONE HYDROCHLORIDE 0.25 MG: 1 INJECTION, SOLUTION INTRAMUSCULAR; INTRAVENOUS; SUBCUTANEOUS at 19:22

## 2023-10-16 RX ADMIN — SMOFLIPID 200 ML: 6; 6; 5; 3 INJECTION, EMULSION INTRAVENOUS at 17:47

## 2023-10-16 RX ADMIN — HEPARIN SODIUM 5000 UNITS: 5000 INJECTION INTRAVENOUS; SUBCUTANEOUS at 21:01

## 2023-10-16 RX ADMIN — HEPARIN SODIUM 5000 UNITS: 5000 INJECTION INTRAVENOUS; SUBCUTANEOUS at 12:58

## 2023-10-16 RX ADMIN — METRONIDAZOLE 500 MG: 500 INJECTION, SOLUTION INTRAVENOUS at 21:01

## 2023-10-16 RX ADMIN — Medication 1 SPRAY: at 07:53

## 2023-10-16 RX ADMIN — INSULIN DETEMIR 15 UNITS: 100 INJECTION, SOLUTION SUBCUTANEOUS at 20:18

## 2023-10-16 RX ADMIN — SACUBITRIL AND VALSARTAN 1 TABLET: 24; 26 TABLET, FILM COATED ORAL at 20:18

## 2023-10-16 RX ADMIN — FOLIC ACID 1 MG: 5 INJECTION, SOLUTION INTRAMUSCULAR; INTRAVENOUS; SUBCUTANEOUS at 09:02

## 2023-10-16 RX ADMIN — POTASSIUM PHOSPHATE, MONOBASIC POTASSIUM PHOSPHATE, DIBASIC: 224; 236 INJECTION, SOLUTION, CONCENTRATE INTRAVENOUS at 17:37

## 2023-10-16 RX ADMIN — SODIUM CHLORIDE 500 ML: 9 INJECTION, SOLUTION INTRAVENOUS at 00:09

## 2023-10-16 RX ADMIN — ONDANSETRON 4 MG: 2 INJECTION INTRAMUSCULAR; INTRAVENOUS at 06:44

## 2023-10-16 NOTE — PLAN OF CARE
Goal Outcome Evaluation:  Plan of Care Reviewed With: patient, son        Progress: improving  Outcome Evaluation: PT re-eval completed. Pt limited by incisional pain, decreased functional endurance, balance deficit, and generalized weakness compared to baseline. Pt required min A to stand and mod A to pivot with moderate posterior lean. Rec continued skilled PT to increase indep with mobility. d/c rec for IRF.      Anticipated Discharge Disposition (PT): inpatient rehabilitation facility

## 2023-10-16 NOTE — PROGRESS NOTES
Iqra Britt  [id number]  1950   LOS: 5 days   Patient Care Team:  Georgie Fernández DO as PCP - General (Family Medicine)    Chief Complaint:  SBO / TACHYCARDIA / NICM & BiV ICD / HFrEF / SEVERE ANEMIA / T2 DM (hemoglobin A1c 8.8%) / CKD    Subjective     Moderate pain and discomfort.  No cardiopulmonary complaints (oximetry 99% on 2 L/min nasal cannula).  Complains of lower-mid back discomfort as well as incisional pain.  No cough, sputum production, pleurisy, or anginal type chest discomfort.    Objective     Vital Sign Min/Max for last 24 hours  Temp  Min: 96.5 °F (35.8 °C)  Max: 99.3 °F (37.4 °C)   BP  Min: 81/49  Max: 121/64   Pulse  Min: 91  Max: 141   Resp  Min: 12  Max: 20   SpO2  Min: 92 %  Max: 100 %      Weight  Min: 56.7 kg (125 lb)  Max: 56.7 kg (125 lb)         10/15/23  0436 10/15/23  1425   Weight: 59 kg (130 lb) 56.7 kg (125 lb)         Intake/Output Summary (Last 24 hours) at 10/16/2023 0718  Last data filed at 10/16/2023 0700  Gross per 24 hour   Intake 5827.03 ml   Output 2900 ml   Net 2927.03 ml       Physical Exam:     General Appearance:    Alert, cooperative, in mild distress   Lungs:     Clear to auscultation,respirations regular, even and                unlabored    Heart:    Regular and normal rate, normal S1 and S2, grade 1/6 systolic murmur, no gallop, no rub, no click   Abdomen:  Extremities:   Soft, nontender, bowel sounds audible x4  1+ generalized edema, normal range of motion   Pulses:   Pulses palpable and equal bilaterally      Results Review:   Results from last 7 days   Lab Units 10/16/23  0223 10/15/23  1551 10/15/23  0508 10/14/23  2207 10/14/23  1255   SODIUM mmol/L 144  --  144  --  146*   POTASSIUM mmol/L 4.7 4.4 3.8   < > 3.5   CHLORIDE mmol/L 116*  --  111*  --  107   CO2 mmol/L 17.0*  --  25.0  --  19.0*   BUN mg/dL 38*  --  32*  --  34*   CREATININE mg/dL 1.15*  --  0.92  --  0.99   GLUCOSE mg/dL 268*  --  217*  --  152*   CALCIUM mg/dL 6.9*  --  8.3*   --  8.1*    < > = values in this interval not displayed.     Results from last 7 days   Lab Units 10/16/23  0413 10/16/23  0223 10/15/23  1551 10/15/23  0405 10/14/23  0430   WBC 10*3/mm3  --  18.21*  --  9.65 10.79   HEMOGLOBIN g/dL 7.4* 7.7* 8.7* 9.8* 9.8*   HEMATOCRIT % 25.3* 25.3* 29.0* 33.3* 31.7*   PLATELETS 10*3/mm3  --  341  --  332 358     Results from last 7 days   Lab Units 10/14/23  1255   CHOLESTEROL mg/dL 135   TRIGLYCERIDES mg/dL 192*     Results from last 7 days   Lab Units 10/12/23  0416   HEMOGLOBIN A1C % 8.80*     Results from last 7 days   Lab Units 10/11/23  1340   HSTROP T ng/L 28*     OP NOTE:    LAPAROTOMY EXPLORATORY, RIGHT SALPINGO OOPHORECTOMY, LYSIS OF ADHESIONS, DESCENDING COLOSOTOMY         ABGs (FiO2 0.28): pH 7.37, PCO2 34, PO2 86 (95% saturation)  LACTATE: 1.2  NO NEW CXR / EKG.  ALBUMIN: 2.0  LFTs: WNL  MAGNESIUM: 2.0  PHOSPHORUS: 2.1    Medication Review: REVIEWED; DRIPS:    Adult Central 2 - in - 1 TPN 60 cc/IR continuous infusion  Phenylephrine 3 mcg/kilogram/minute  Normal saline 100 cc/hour continuous infusion    Assessment & Plan     Intake and output positive by almost 3 L.  Would consider reducing IV crystalloid infusions and using as necessary colloid support for marginal systolic blood pressure.  If blood pressure remains low would consider altering phenylephrine to norepinephrine.  Slowly progressive acute blood loss anemia with reduced GFR measurement this morning.  Spironolactone has been on hold since 14 October 2023; concur.  Phosphorous needs replacement.      SBO (small bowel obstruction)    Ovarian mass    COPD (chronic obstructive pulmonary disease)    Chronic HFrEF (heart failure with reduced ejection fraction)    CKD (chronic kidney disease), stage III    Type 2 diabetes mellitus    10/16/23  07:18 EDT

## 2023-10-16 NOTE — THERAPY TREATMENT NOTE
Patient Name: Iqra Britt  : 1950    MRN: [id number]                              Today's Date: 10/16/2023       Admit Date: 10/11/2023    Visit Dx:     ICD-10-CM ICD-9-CM   1. Pelvic mass  R19.00 789.30   2. Partial intestinal obstruction, unspecified cause  K56.600 560.9   3. Nausea and vomiting, unspecified vomiting type  R11.2 787.01   4. Other ascites  R18.8 789.59   5. Hyperglycemia  R73.9 790.29   6. Renal insufficiency  N28.9 593.9   7. SBO (small bowel obstruction)  K56.609 560.9     Patient Active Problem List   Diagnosis    Ovarian mass    COPD (chronic obstructive pulmonary disease)    Chronic HFrEF (heart failure with reduced ejection fraction)    CKD (chronic kidney disease), stage III    Type 2 diabetes mellitus    SBO (small bowel obstruction)     Past Medical History:   Diagnosis Date    Arthritis     CHF (congestive heart failure)     COPD (chronic obstructive pulmonary disease)     Diabetes mellitus     Hyperlipidemia     Renal disorder      Past Surgical History:   Procedure Laterality Date    CHOLECYSTECTOMY      DILATATION AND CURETTAGE      EXPLORATORY LAPAROTOMY N/A 10/15/2023    Procedure: LAPAROTOMY EXPLORATORY, RIGHT SALPINGO OOPHORECTOMY, LYSIS OF ADHESIONS, DESCENDING COLOSOTOMY;  Surgeon: Dana Draper MD;  Location: [facility];  Service: Gynecology Oncology;  Laterality: N/A;      General Information       Row Name 10/16/23 1435          Physical Therapy Time and Intention    Document Type re-evaluation  -AY     Mode of Treatment physical therapy  -AY       Row Name 10/16/23 1437          General Information    Patient Profile Reviewed yes  -AY     Prior Level of Function independent:;all household mobility;community mobility;gait;transfer;bed mobility  amb with rollator  -AY     Existing Precautions/Restrictions fall;other (see comments)  NG to suction; abdominal suction  -AY     Barriers to Rehab medically complex  -AY       Row Name 10/16/23 1436           Living Environment    People in Home child(rachel), adult  -AY       Row Name 10/16/23 1435          Home Main Entrance    Number of Stairs, Main Entrance none  -AY       Row Name 10/16/23 1435          Stairs Within Home, Primary    Number of Stairs, Within Home, Primary none  -AY       Row Name 10/16/23 1435          Cognition    Orientation Status (Cognition) oriented x 3  -AY       Row Name 10/16/23 1435          Safety Issues, Functional Mobility    Safety Issues Affecting Function (Mobility) insight into deficits/self-awareness;sequencing abilities;awareness of need for assistance  -AY     Impairments Affecting Function (Mobility) balance;endurance/activity tolerance;shortness of breath;strength;pain  -AY               User Key  (r) = Recorded By, (t) = Taken By, (c) = Cosigned By      Initials Name Provider Type    AY Saundra Benitez, PT Physical Therapist                   Mobility       Row Name 10/16/23 1438          Bed Mobility    Bed Mobility supine-sit  -AY     Supine-Sit [place] (Bed Mobility) minimum assist (75% patient effort);1 person assist  -AY     Assistive Device (Bed Mobility) bed rails;head of bed elevated  -AY     Comment, (Bed Mobility) cueing for sequencing and hand placement. use of pillow for bracing. educated on using log roll for pain management.  -AY       Row Name 10/16/23 1438          Bed-Chair Transfer    Bed-Chair [place] (Transfers) moderate assist (50% patient effort);1 person assist  -AY     Assistive Device (Bed-Chair Transfers) other (see comments)  -AY       Row Name 10/16/23 1438          Sit-Stand Transfer    Sit-Stand [place] (Transfers) minimum assist (75% patient effort);1 person assist  -AY     Assistive Device (Sit-Stand Transfers) walker, front-wheeled  -AY     Comment, (Sit-Stand Transfer) performed x2 reps (one with BUE support and one wiht RWx)  -AY       Row Name 10/16/23 1438          Gait/Stairs (Locomotion)    [place] Level (Gait) unable to  assess  -AY     Comment, (Gait/Stairs) deferred d/t severe posterior lean in standing and c/o worsening dizziness.  -AY               User Key  (r) = Recorded By, (t) = Taken By, (c) = Cosigned By      Initials Name Provider Type    Saundra Georges, PT Physical Therapist                   Obj/Interventions       Row Name 10/16/23 1451          Range of Motion Comprehensive    General Range of Motion bilateral lower extremity ROM WFL  -AY       Row Name 10/16/23 1451          Strength Comprehensive (MMT)    General Manual Muscle Testing (MMT) Assessment lower extremity strength deficits identified  -AY     Comment, General Manual Muscle Testing (MMT) Assessment BLE grossly 4-/5  -AY       Row Name 10/16/23 1451          Balance    Balance Assessment sitting static balance;sitting dynamic balance;sit to stand dynamic balance;standing static balance;standing dynamic balance  -AY     Static Sitting Balance contact guard  -AY     Dynamic Sitting Balance minimal assist  -AY     Position, Sitting Balance unsupported;sitting edge of bed;sitting in chair  -AY     Sit to Stand Dynamic Balance minimal assist  -AY     Static Standing Balance minimal assist  -AY     Dynamic Standing Balance moderate assist  -AY     Position/Device Used, Standing Balance supported;walker, rolling;unsupported  -AY       Row Name 10/16/23 1451          Sensory Assessment (Somatosensory)    Sensory Assessment (Somatosensory) LE sensation intact  -AY               User Key  (r) = Recorded By, (t) = Taken By, (c) = Cosigned By      Initials Name Provider Type    Saundra Georges, PT Physical Therapist                   Goals/Plan       Row Name 10/16/23 1450          Bed Mobility Goal 1 (PT)    Activity/Assistive Device (Bed Mobility Goal 1, PT) sit to supine/supine to sit  -AY     [place] Level/Cues Needed (Bed Mobility Goal 1, PT) contact guard required  -AY     Time Frame (Bed Mobility Goal 1, PT) long term goal (LTG);10 days  -AY      Progress/Outcomes (Bed Mobility Goal 1, PT) medical status inhibiting progress;goal revised this date  -AY       Row Name 10/16/23 1456          Transfer Goal 1 (PT)    Activity/Assistive Device (Transfer Goal 1, PT) sit-to-stand/stand-to-sit;bed-to-chair/chair-to-bed;walker, rolling  -AY     [place] Level/Cues Needed (Transfer Goal 1, PT) contact guard required  -AY     Time Frame (Transfer Goal 1, PT) long term goal (LTG);10 days  -AY     Progress/Outcome (Transfer Goal 1, PT) medical status inhibiting progress;goal revised this date  -AY       Row Name 10/16/23 1456          Gait Training Goal 1 (PT)    Activity/Assistive Device (Gait Training Goal 1, PT) gait (walking locomotion);assistive device use;walker, rolling  -AY     [place] Level (Gait Training Goal 1, PT) minimum assist (75% or more patient effort)  -AY     Distance (Gait Training Goal 1, PT) 150  -AY     Time Frame (Gait Training Goal 1, PT) long term goal (LTG);10 days  -AY     Progress/Outcome (Gait Training Goal 1, PT) goal revised this date;medical status inhibiting progress  -AY       Row Name 10/16/23 1456          Therapy Assessment/Plan (PT)    Planned Therapy Interventions (PT) balance training;bed mobility training;home exercise program;gait training;transfer training;postural re-education;patient/family education;strengthening;neuromuscular re-education  -AY               User Key  (r) = Recorded By, (t) = Taken By, (c) = Cosigned By      Initials Name Provider Type    AY Saundra Benitez, PT Physical Therapist                   Clinical Impression       Row Name 10/16/23 1451          Pain    Pretreatment Pain Rating 5/10  -AY     Posttreatment Pain Rating 5/10  -AY     Pain Location incisional  -AY     Pain Location - abdomen  -AY     Pain Intervention(s) Ambulation/increased activity;Repositioned;Nursing Notified  -AY     Additional Documentation Pain Scale: Numbers Pre/Post-Treatment (Group)  -AY       Row Name 10/16/23 4011           Plan of Care Review    Plan of Care Reviewed With patient;son  -AY     Progress improving  -AY     Outcome Evaluation PT re-eval completed. Pt limited by incisional pain, decreased functional endurance, balance deficit, and generalized weakness compared to baseline. Pt required min A to stand and mod A to pivot with moderate posterior lean. Rec continued skilled PT to increase indep with mobility. d/c rec for IRF.  -AY       Row Name 10/16/23 1451          Therapy Assessment/Plan (PT)    Rehab Potential (PT) good, to achieve stated therapy goals  -AY     Criteria for Skilled Interventions Met (PT) yes;meets criteria;skilled treatment is necessary  -AY     Therapy Frequency (PT) daily  -AY       Row Name 10/16/23 1451          Vital Signs    Pre Systolic BP Rehab 119  -AY     Pre Treatment Diastolic BP 53  -AY     Post Systolic BP Rehab 103  -AY     Post Treatment Diastolic BP 49  -AY     Pretreatment Heart Rate (beats/min) 121  -AY     Posttreatment Heart Rate (beats/min) 125  -AY     Pre SpO2 (%) 93  -AY     O2 Delivery Pre Treatment room air  -AY     O2 Delivery Intra Treatment room air  -AY     Post SpO2 (%) 95  -AY     O2 Delivery Post Treatment room air  -AY     Pre Patient Position Supine  -AY     Intra Patient Position Standing  -AY     Post Patient Position Sitting  -AY       Row Name 10/16/23 1451          Positioning and Restraints    Pre-Treatment Position in bed  -AY     Post Treatment Position chair  -AY     In Chair notified nsg;reclined;sitting;call light within reach;encouraged to call for assist;exit alarm on;with family/caregiver;legs elevated;waffle cushion;LUE elevated;RUE elevated;with nsg  -AY               User Key  (r) = Recorded By, (t) = Taken By, (c) = Cosigned By      Initials Name Provider Type    Saundra Georges, PT Physical Therapist                   Outcome Measures       Row Name 10/16/23 1457 10/16/23 0800       How much help from another person do you currently need...     Turning from your back to your side while in flat bed without using bedrails? 3  -AY 2  -CH    Moving from lying on back to sitting on the side of a flat bed without bedrails? 3  -AY 2  -CH    Moving to and from a bed to a chair (including a wheelchair)? 2  -AY 2  -CH    Standing up from a chair using your arms (e.g., wheelchair, bedside chair)? 3  -AY 3  -CH    Climbing 3-5 steps with a railing? 2  -AY 2  -CH    To walk in hospital room? 3  -AY 3  -CH    AM-PAC 6 Clicks Score (PT) 16  -AY 14  -CH    Highest level of mobility 5 --> Static standing  -AY 4 --> Transferred to chair/commode  -CH      Row Name 10/16/23 1457          Functional Assessment    Outcome Measure Options AM-PAC 6 Clicks Basic Mobility (PT)  -AY               User Key  (r) = Recorded By, (t) = Taken By, (c) = Cosigned By      Initials Name Provider Type     Marybeth Donovan RN Registered Nurse    Saundra Georges, PT Physical Therapist                                 Physical Therapy Education       Title: PT OT SLP Therapies (In Progress)       Topic: Physical Therapy (In Progress)       Point: Mobility training (In Progress)       Learning Progress Summary             Patient Acceptance, E,TB, NR by  at 10/16/2023 1457    Acceptance, TB,E, VU,DU by  at 10/16/2023 1227    Acceptance, E, VU,NR by  at 10/12/2023 1357   Family Acceptance, E,TB, NR by  at 10/16/2023 1457    Acceptance, TB,E, VU,DU by  at 10/16/2023 1227    Acceptance, E, VU,NR by  at 10/12/2023 1357                         Point: Home exercise program (Done)       Learning Progress Summary             Patient Acceptance, TB,E, VU,DU by  at 10/16/2023 1227   Family Acceptance, TB,E, VU,DU by  at 10/16/2023 1227                         Point: Body mechanics (In Progress)       Learning Progress Summary             Patient Acceptance, E,TB, NR by AY at 10/16/2023 1457    Acceptance, TB,E, VU,DU by  at 10/16/2023 1227    Acceptance, E, VU,NR by  at 10/12/2023  1357   Family Acceptance, E,TB, NR by  at 10/16/2023 1457    Acceptance, TB,E, VU,DU by  at 10/16/2023 1227    Acceptance, E, VU,NR by  at 10/12/2023 1357                         Point: Precautions (In Progress)       Learning Progress Summary             Patient Acceptance, E,TB, NR by AY at 10/16/2023 1457    Acceptance, TB,E, VU,DU by  at 10/16/2023 1227    Acceptance, E, VU,NR by  at 10/12/2023 1357   Family Acceptance, E,TB, NR by  at 10/16/2023 1457    Acceptance, TB,E, VU,DU by  at 10/16/2023 1227    Acceptance, E, VU,NR by  at 10/12/2023 1357                                         User Key       Initials Effective Dates Name Provider Type Discipline     06/16/21 -  Marybeth Donovan RN Registered Nurse Nurse     11/10/20 -  Saundra Benitez, PT Physical Therapist PT     09/21/23 -  Diana Reis, PT Physical Therapist PT                  PT Recommendation and Plan  Planned Therapy Interventions (PT): balance training, bed mobility training, home exercise program, gait training, transfer training, postural re-education, patient/family education, strengthening, neuromuscular re-education  Plan of Care Reviewed With: patient, son  Progress: improving  Outcome Evaluation: PT re-eval completed. Pt limited by incisional pain, decreased functional endurance, balance deficit, and generalized weakness compared to baseline. Pt required min A to stand and mod A to pivot with moderate posterior lean. Rec continued skilled PT to increase indep with mobility. d/c rec for IRF.     Time Calculation:         PT Charges       Row Name 10/16/23 1458             Time Calculation    Start Time 1330  -AY      PT Received On 10/16/23  -AY      PT Goal Re-Cert Due Date 10/26/23  -AY         Timed Charges    76356 - PT Therapeutic Activity Minutes 23  -AY         Untimed Charges    PT Eval/Re-eval Minutes 20  -AY         Total Minutes    Timed Charges Total Minutes 23  -AY      Untimed Charges Total Minutes  20  -AY       Total Minutes 43  -AY                User Key  (r) = Recorded By, (t) = Taken By, (c) = Cosigned By      Initials Name Provider Type    AY Saundra Benitez, PT Physical Therapist                  Therapy Charges for Today       Code Description Service Date Service Provider Modifiers Qty    [id number]  PT THERAPEUTIC ACT EA 15 MIN 10/16/2023 Saundra Benitez, PT GP 2    [id number]  PT RE-EVAL ESTABLISHED PLAN 2 10/16/2023 Saundra Benitez PT GP 1            PT G-Codes  Outcome Measure Options: AM-PAC 6 Clicks Basic Mobility (PT)  AM-PAC 6 Clicks Score (PT): 16  AM-PAC 6 Clicks Score (OT): 22  PT Discharge Summary  Anticipated Discharge Disposition (PT): inpatient rehabilitation facility    Saundra Benitez PT  10/16/2023

## 2023-10-16 NOTE — CONSULTS
Clinical Nutrition   Nutrition Support Assessment  Reason for Visit: Consult, PN      Patient Name: Iqra Britt  YOB: 1950  MRN: [id number]  Date of Encounter: 10/16/23 10:22 EDT  Admission date: 10/11/2023    Comments:    To better meet needs change PN to the following:  15% dextrose, 5% AA. g@ 60ml/hr. 200ml 20% SMOF lipid daily  Infused at goal x 24 hrs, regimen provides:  TGV=1.45L, GIR= 2.6  1422kcals (98% est needs), 72g pro (100% est needs)    Recommend adjust insulin coverage for better BG control.     Nutrition Assessment   Admission Diagnosis:  Ovarian mass [N83.8]    Problem List:    SBO (small bowel obstruction)    Ovarian mass    COPD (chronic obstructive pulmonary disease)    Chronic HFrEF (heart failure with reduced ejection fraction)    CKD (chronic kidney disease), stage III    Type 2 diabetes mellitus      PMH:   She  has a past medical history of Arthritis, CHF (congestive heart failure), COPD (chronic obstructive pulmonary disease), Diabetes mellitus, Hyperlipidemia, and Renal disorder.    PSH:  She  has a past surgical history that includes Cholecystectomy; Dilation and curettage of uterus; and Exploratory Laparotomy (N/A, 10/15/2023).    Applicable Nutrition Concerns:      Applicable Interval History:   (10/11) NGT to LWS initiated (d/t SBO caused by ovarian mass)  (10/12) s/p paracentesis - 700 ml removed  (10/15) S/P expo lap, right salpingo oophorectomy, lysis of adhesion and descending colostomy     SLP to evaluate when appropriate    Reported/Observed/Food/Nutrition Related History:   10/16  TPN for nutrition support. Has NGT.  NPO for now,  diet progression per surgeon discretion.  Remains on Ricardo.     Patient reports tolerating some ice chips this morning. Feeling hungry.       10/14  RD rec'd consult for TPN 2/2 little improvement w/bowel rest per MD note. Plan is for ex lap, ostomy, tumor biopsy tomorrow.    10/12  Per EMR patient developed  vaginal bleeding (5/2023) s/p EMB (6/2023) negative for cancer --> on/off symptoms s/p D&C (10/9/23) which showed abnormal fluid in uterus (pathology pending).    Patient and two family members present during visit. Patient c/o N/V/abdominal distension and pain x 3 weeks with minimal PO intake (<50% of usual) during that timeframe. Last solid food intake held down was 3 weeks ago. Reports -138 lbs. Weighed 133 lbs last week. Bed scale weight (one large blanket removed) reads 128 lbs. NKFA. Denies difficulty chewing/swallowing. Hungry and eager to eat/drink. No current plan for surgery or inpatient chemotherapy per Gynecology MD note today.    Labs    Labs Reviewed: Yes     Results from last 7 days   Lab Units 10/16/23  0639 10/16/23  0339 10/16/23  0223 10/15/23  1551 10/15/23  0508 10/14/23  2207 10/14/23  1255 10/14/23  0430 10/12/23  0416   GLUCOSE mg/dL  --   --  268*  --  217*  --  152*   < > 285*   BUN mg/dL  --   --  38*  --  32*  --  34*   < > 58*   CREATININE mg/dL  --   --  1.15*  --  0.92  --  0.99   < > 1.52*   SODIUM mmol/L  --   --  144  --  144  --  146*   < > 130*   CHLORIDE mmol/L  --   --  116*  --  111*  --  107   < > 95*   POTASSIUM mmol/L  --   --  4.7 4.4 3.8   < > 3.5   < > 4.0   PHOSPHORUS mg/dL  --   --  2.1*  --  2.5  --  4.0  --   --    MAGNESIUM mg/dL  --   --  2.0  --  2.4  --  2.3  --   --    ALT (SGPT) U/L  --   --  9  --   --   --  12  --  10   LACTATE mmol/L 1.2 2.7*  --   --   --   --   --   --   --     < > = values in this interval not displayed.       Results from last 7 days   Lab Units 10/16/23  0223 10/14/23  1255 10/12/23  0416   ALBUMIN g/dL 2.0* 2.6* 2.5*   PREALBUMIN mg/dL  --  7.3*  --    CRP mg/dL  --  16.83*  --    IONIZED CALCIUM mmol/L  --  1.29  --    CHOLESTEROL mg/dL  --  135  --    TRIGLYCERIDES mg/dL  --  192*  --        Results from last 7 days   Lab Units 10/16/23  0638 10/15/23  2343 10/15/23  1836 10/15/23  1400 10/15/23  1230 10/15/23  0533   GLUCOSE  "mg/dL 255* 353* 298* 249* 203* 218*     Lab Results   Lab Value Date/Time    HGBA1C 8.80 (H) 10/12/2023 0416         Results from last 7 days   Lab Units 10/11/23  1340   PROBNP pg/mL 1,889.0*       Medications    Medications Reviewed: Yes  Pertinent: Folic acid, Insulin, Zoloft, Thiamine   Infusion:  TPN @ 60ml/hr, pema 3mcg/kg/mi, IVF @ 100ml/hr  PRN: Zofran     Intake/Ouptut 24 hrs (0701 - 0700)   I&O's Reviewed: Yes   NGT: 1160ml  Stool: (ostomy) 10ml    Anthropometrics     Flowsheet Rows      Flowsheet Row First Filed Value   Admission Height 154.9 cm (61\") Documented at 10/11/2023 1322   Admission Weight 60.3 kg (133 lb) Documented at 10/11/2023 1322       Height: Height: 154.9 cm (61\")  Last Filed Weight: Weight: 56.7 kg (125 lb) (10/15/23 1425)  Method: Weight Method: Bed scale  BMI: BMI (Calculated): 23.6  BMI classification: Overweight: 25.0-29.9kg/m2   IBW:  105 lbs    UBW: 135-138 lbs per patient  Per EMR wt hx (office visits)  132 lbs (10/9/23)  141 lbs (9/19/23)  137 lbs (6/7/23)  141 lbs (3/14/23)    Weight change: Possible unintentional weight loss of 13 lbs x 3 weeks. May be more than this as bed scale weight consisted of a couple of sheets and a pillow (RD removed large blanket). Need standing scale weight.    Needs Assessment   Date:10/14    Height used:Height: 154.9 cm (61\")  Weights used: 58kg/128lbs    Estimated Calorie needs: ~ 1450 Kcal/day  Method:  25-30 Kcals/KG 1450-1740kcals  Method:  MSJ (1038) x1.3= 1350    Estimated Protein needs: ~70 g PRO/day (CKD III)  Method: 1.2g/Kg    Estimated Fluid needs: ~ ml/day   Per clinical status    Nutrition Focused Physical Exam     Date:  10/12  Unable to perform exam due to: Pt unable to participate at time of visit    Current Nutrition Prescription   PO: NPO Diet NPO Type: Strict NPO  Adult Central 2-in-1 TPN  Oral Nutrition Supplement: N/A  Intake: N/A    PN Route: PICC  PN:  9% dextrose, 4.8%AA @ 60ml/hr               GIR: 1.5  Lipid: SMOF 250ml " 20% daily     PN@ Goal over 24hr to Supply:   Volume 1450 mL/day     Energy 1217 Kcal/day 84 % Est Need   Protein 69 g/day 99 % Est Need     ---------------------------------------------------------------------------  Formula/Rate verified at bedside: Yes  Infusing Rate at time of visit: @ goal rate     Average Delivery from [facility] Day:    PN Volume 1219 mL/day     Lipid delivered?  yes      Energy 1107 Kcal/day 76 % Est Need   Protein 59 g/day 84 % Est Need         Nutrition Diagnosis     Date:  10/14            Updated:    Problem Inadequate oral intake   Etiology N/V/abdominal pain and distension in setting of SBO   Signs/Symptoms <50% of usual PO intake x 3 weeks   Status:     Date:  10/14 Updated: 10/16  Problem Altered GI function   Etiology Pending ex-lap ostomy, tumor biopsy   Signs/Symptoms NPO, plan for PICC and TPN   Status: PN started     Goal:   General: Nutrition to support treatment  PO: Advace diet as medically feasible/appropriate  EN/PN: Adjust PN    Nutrition Intervention      Follow treatment progress, Care plan reviewed, Await begin PO    TPN regimen via PICC:  To better meet needs change PN to the following:  15% dextrose, 5% AA. g@ 60ml/hr. 200ml 20% SMOF lipid daily  Infused at goal x 24 hrs, regimen provides:  TGV=1.45L, GIR= 2.6  1422kcals (98% est needs), 72g pro (100% est needs)      Monitoring/Evaluation:   Per protocol, I&O, Pertinent labs, Weight, GI status, Symptoms, POC/GOC, Swallow function      Marce Obrien RD  Time Spent: 30 min

## 2023-10-16 NOTE — PROGRESS NOTES
Gynecologic Oncology   Daily Progress Note    Chief Complaint: s/p ex lap, colostomy, RSO    Subjective   Patient did OK overnight. Off pressors. Pain is improved on dilaudid.  She is not having nausea currently.  She is NPO with TPN running and NGT in place. Khan in place with adequate urine. Pt wants to drink    Per nursing, NGT was clamped last night for 5-6 hours accidentally and pt had 100cc when hooked up.    Inpatient Medications:     Current Facility-Administered Medications:     acetaminophen (TYLENOL) tablet 650 mg, 650 mg, Nasogastric, Q4H PRN, Dana Draper MD    Adult Central 2-in-1 TPN, , Intravenous, Continuous, Popeye Reyna, PharmD, Last Rate: 60 mL/hr at 10/15/23 1832, New Bag at 10/15/23 1832    Adult Central 2-in-1 TPN, , Intravenous, Continuous, Francisco Roldan, [facility]    Calcium Replacement - Follow Nurse / BPA Driven Protocol, , Does not apply, PRN, Dana Draper MD    cefTRIAXone (ROCEPHIN) 1000 mg/100 mL 0.9% NS (MBP), 1,000 mg, Intravenous, Q24H, Staci Bales APRN, 1,000 mg at 10/16/23 0509    dextrose (D50W) (25 g/50 mL) IV injection 25 g, 25 g, Intravenous, Q15 Min PRN, Dana Draper MD    dextrose (GLUTOSE) oral gel 15 g, 15 g, Oral, Q15 Min PRN, Dana Draper MD    donepezil (ARICEPT) tablet 5 mg, 5 mg, Nasogastric, Nightly, Dana Draper MD, 5 mg at 10/14/23 2148    famotidine (PEPCID) injection 20 mg, 20 mg, Intravenous, Daily, Dana Draper MD, 20 mg at 10/16/23 0902    Fat Emul Fish Oil/Plant Based (SMOFLIPID) 20 % emulsion 200 mL, 200 mL, Intravenous, Q24H (TPN), Francisco Roldan, [facility]    folic acid 1 mg in sodium chloride 0.9 % 50 mL IVPB, 1 mg, Intravenous, Daily, Valentine, Jg E, PharmD, Last Rate: 100 mL/hr at 10/16/23 0902, 1 mg at 10/16/23 0902    glucagon (GLUCAGEN) injection 1 mg, 1 mg, Intramuscular, Q15 Min PRN, Dana Draper MD    heparin (porcine) 5000 UNIT/ML injection 5,000 Units, 5,000 Units, Subcutaneous, Q8H,  Dana Draper MD, 5,000 Units at 10/16/23 1258    hydrALAZINE (APRESOLINE) injection 10 mg, 10 mg, Intravenous, Q6H PRN, Dana Draper MD    HYDROmorphone (DILAUDID) injection 0.25 mg, 0.25 mg, Intravenous, Q4H PRN, Elizabeth Dietz MD, 0.25 mg at 10/16/23 1346    insulin detemir (LEVEMIR) injection 15 Units, 15 Units, Subcutaneous, Nightly, Case, Regla V., DO    insulin regular (humuLIN R,novoLIN R) injection 3-14 Units, 3-14 Units, Subcutaneous, Q6H, Dana Draper MD, 5 Units at 10/16/23 1140    ipratropium-albuterol (DUO-NEB) nebulizer solution 3 mL, 3 mL, Nebulization, Q4H PRN, Dana Draper MD    ivabradine HCl (CORLANOR) tablet 7.5 mg, 7.5 mg, Nasogastric, BID With Meals, Dana Draper MD, 7.5 mg at 10/16/23 0903    LORazepam (ATIVAN) injection 0.25 mg, 0.25 mg, Intravenous, Q6H PRN, Dana Draper MD    Magnesium Standard Dose Replacement - Follow Nurse / BPA Driven Protocol, , Does not apply, PRN, Dana Draper MD    metroNIDAZOLE (FLAGYL) IVPB 500 mg, 500 mg, Intravenous, Q8H, Staci Bales APRN, Last Rate: 100 mL/hr at 10/16/23 1258, 500 mg at 10/16/23 1258    ondansetron (ZOFRAN) injection 4 mg, 4 mg, Intravenous, Q6H PRN, Dana Draper MD, 4 mg at 10/16/23 0644    Pharmacy to Dose TPN, , Does not apply, Continuous PRN, Dana Draper MD    phenol (CHLORASEPTIC) 1.4 % liquid 1 spray, 1 spray, Mouth/Throat, Q2H PRN, Dana Draper MD, 1 spray at 10/16/23 0753    phenylephrine (DARIAN-SYNEPHRINE) 50 mg in sodium chloride 0.9 % 250 mL infusion, 0.5-3 mcg/kg/min, Intravenous, Titrated, Shani Oshea APRN, Held at 10/16/23 1345    Phosphorus Replacement - Follow Nurse / BPA Driven Protocol, , Does not apply, Bonny ZENDEJAS Hope M., MD    Potassium Replacement - Follow Nurse / BPA Driven Protocol, , Does not apply, Bonny ZENDEJAS Hope M., MD    sacubitril-valsartan (ENTRESTO) 24-26 MG tablet 1 tablet, 1 tablet, Nasogastric, BID, Bonny  Dana MORRELL MD, 1 tablet at 10/16/23 0911    sertraline (ZOLOFT) tablet 75 mg, 75 mg, Nasogastric, Daily, Dana Draper MD, 75 mg at 10/16/23 0902    [COMPLETED] Insert Peripheral IV, , , Once **AND** sodium chloride 0.9 % flush 10 mL, 10 mL, Intravenous, PRN, Dana Draper MD    sodium chloride 0.9 % flush 10 mL, 10 mL, Intravenous, Q12H, Dana Draper MD, 10 mL at 10/16/23 0903    sodium chloride 0.9 % flush 10 mL, 10 mL, Intravenous, PRN, Dana Draper MD    sodium chloride 0.9 % infusion 40 mL, 40 mL, Intravenous, PRN, Dana Draper MD    spironolactone (ALDACTONE) tablet 25 mg, 25 mg, Nasogastric, Daily, Dana Draper MD, 25 mg at 10/16/23 0902    thiamine (B-1) injection 100 mg, 100 mg, Intravenous, Daily, Jg Grijalva PharmD, 100 mg at 10/16/23 0902     Objective   Temp:  [96.8 °F (36 °C)-99.3 °F (37.4 °C)] 98.7 °F (37.1 °C)  Heart Rate:  [113-141] 121  Resp:  [12-20] 16  BP: ()/(33-76) 85/49  Arterial Line BP: (97)/(45) 97/45  Vitals:    10/16/23 1448   BP:    Pulse:    Resp: 16   Temp:    SpO2:      I/O last 3 completed shifts:  In: 5827 [I.V.:3695.8; IV Piggyback:665]  Out: 2900 [Urine:480; Emesis/NG output:1160; Other:700; Stool:10; Blood:550]                 GENERAL: Alert, well-appearing female in no apparent distress.    CARDIOVASCULAR: Normal rate, regular rhythm, no murmurs, rubs, or gallops.    RESPIRATORY: Clear to auscultation bilaterally, normal respiratory effort  GASTROINTESTINAL:  tender throughout, distended,. hypoactive bowel sounds.  Incision(s) CDI staples in place. Colostomy bag with 15cc. Ostomy site pink and viable  GENITOURINARY: Khan in place  SKIN:  Warm, dry, well-perfused.    PSYCHIATRIC: AO x3, with appropriate affect, normal thought processes  EXREMITIES: Symmetric. No peripheral edema.    Lab Results   Component Value Date    WBC 18.21 (H) 10/16/2023    HGB 7.0 (L) 10/16/2023    HCT 22.6 (L) 10/16/2023    MCV 87.2 10/16/2023      10/16/2023    NEUTROABS 8.67 (H) 10/12/2023    GLUCOSE 211 (H) 10/16/2023    BUN 34 (H) 10/16/2023    CREATININE 0.92 10/16/2023    EGFRIFNONA 34 (L) 05/02/2022    EGFRIFAFRI 41 (L) 05/02/2022     (H) 10/16/2023    K 3.9 10/16/2023     (H) 10/16/2023    CO2 16.0 (L) 10/16/2023    MG 2.0 10/16/2023    CALCIUM 6.2 (L) 10/16/2023         Assessment & Plan   Iqra Britt is a 72 y.o. female POD2 s/p ex lap, RSO, lysis of adhesions, descending colostomy on 10/15 in setting of SBO and new pelvic mass    1.  Post-operative care  -Routine care (IS use, continue IVF, VTE prophylaxis)  -continue current pain control regimen  -currently NPO with TPN and NGT; per nursing had a clamp trial accidentally last night and was clamped for 5 hours with 100cc out. Keep NGT in place but patient can have some CLD today. If start to have N/V, please hook up NGT and make pt NPO  -pathology pending from surgery, cytology pending from paracentesis   -off pressors  -continue strict Is and Os  -Hb 8.7-7.7-1u-7.4-7.0- 1u- 8.9-8.8-9.0  -leukocytosis likely postoperative    2. Malnutrition  -continue NGT, PICC and TPN    3.  Significant comorbidities  -FIORDALIZA in the context of CKD stage III; Cr 0.92this AM  -CHF, seen by cardiology.  Moderate risk for surgical intervention    -Type 2 diabetes mellitus  -COPD    4.   Disposition  -Continue ICU care. Rest of care per primary team         Alexandria Puente MD  10/16/23  15:46 EDT

## 2023-10-16 NOTE — PROGRESS NOTES
Called by RN that patient was maxed on phenylephrine drip with tachycardia and soft BP in the 90s and low urine output.  Over the course of the night, patient had 1L NS bolus, 500 ml Albumin and added maintenance NS.  UOP remained low.  Serial H/H ordered with slow decline overnight.  ABG revealed compensated metabolic acidosis.  Patient had no complaints other than normal post operative pain.  Abd was distended but appeared the same since beginning of the shift.  Will monitor.  D/w Dr. Craig.      DASHAWN Rodriguez, AGACNP-BC, FNP-BC  Pulmonary and Critical Care Service

## 2023-10-16 NOTE — CASE MANAGEMENT/SOCIAL WORK
Continued Stay Note  [facility]     Patient Name: Iqra Britt  MRN: [id number]  Today's Date: 10/16/2023    Admit Date: 10/11/2023    Plan: Home   Discharge Plan       Row Name 10/16/23 1245       Plan    Plan Home    Patient/Family in Agreement with Plan yes    Plan Comments Ms. Britt will return home with family at the time of discharge and family will transport. No discharge needs identified at this time. CM will continue to follow.    Final Discharge Disposition Code 01 - home or self-care                   Discharge Codes    No documentation.                 Expected Discharge Date and Time       Expected Discharge Date Expected Discharge Time    Oct 19, 2023               Haseeb Mejias RN

## 2023-10-16 NOTE — NURSING NOTE
[facility] follow up for Stoma care and assessment    Surgery date: 10/10/2023       Surgeon: Bonny    [facility] Care Outcome: Patient seen for ostomy care, assessment and education. Patient awake. Patient seen in the Intensive Care Unit (ICU) .  Son at beside.    Stoma Type: Descending Colostomy  Diameter/shape: Round, measurements not taken  Location: Left upper quadrant  Protrusion: Budded  Stoma Characteristics: Round, Edematous, Moist, and Pink  Peristomal skin: RAMON =unable to assess  Effluent Characteristics: Brown, Light, bilious  Effluent volume emptied:     Current pouching system: [place], 8331, flat, drainable  Accessory products used:   Goal wear time: 3-4 days  Emptying frequency of appliance per day: Recommend emptying when 1/3-1/2 full.   Last appliance change: 10/10/2023    Follow up visit summary:  -Stoma viable, awaiting stool output.  -Plans provide ostomy education folder to patient and son tomorrow.  -Explained the role of the stoma nurse and our goals for care during hospitalization.  -Nutrition: TPN.  -Mobilize when appropriate.      [facility] Nurse will continue to follow daily for ostomy education. Please contact with questions or if new needs arise.     Homero Maloney RN, BSN, CCRN, CWOCN  Wound, Ostomy and Continence (WOC) Department  [facility]

## 2023-10-16 NOTE — PROGRESS NOTES
Pharmacy Parenteral Nutrition Evaluation    Iqra Britt is a  72 y.o. female receiving TPN.     Indication: Bowel Obstruction  Consulting Physician: Dr Draper    Total Fluid Rate (if stated): N/A    Labs  Results from last 7 days   Lab Units 10/15/23  0508 10/14/23  2207 10/14/23  1255 10/14/23  0430 10/12/23  0416 10/11/23  1347 10/11/23  1340   SODIUM mmol/L 144  --  146* 144 130*  --  133*   POTASSIUM mmol/L 3.8 4.3 3.5 3.3* 4.0  --  4.5   CHLORIDE mmol/L 111*  --  107 108* 95*  --  91*   CO2 mmol/L 25.0  --  19.0* 21.0* 23.0  --  22.0   BUN mg/dL 32*  --  34* 37* 58*  --  58*   CREATININE mg/dL 0.92  --  0.99 0.94 1.52*   < > 1.72*   CALCIUM mg/dL 8.3*  --  8.1* 8.0* 7.9*  --  8.5*   BILIRUBIN mg/dL  --   --  0.5  --  0.4  --  0.4   ALK PHOS U/L  --   --  54  --  46  --  56   ALT (SGPT) U/L  --   --  12  --  10  --  13   AST (SGOT) U/L  --   --  47*  --  30  --  31   GLUCOSE mg/dL 217*  --  152* 120* 285*  --  358*    < > = values in this interval not displayed.       Results from last 7 days   Lab Units 10/15/23  0508 10/14/23  1255   MAGNESIUM mg/dL 2.4 2.3   PHOSPHORUS mg/dL 2.5 4.0   PREALBUMIN mg/dL  --  7.3*       Results from last 7 days   Lab Units 10/15/23  0405 10/14/23  0430 10/12/23  0416   WBC 10*3/mm3 9.65 10.79 9.53   HEMOGLOBIN g/dL 9.8* 9.8* 10.8*   HEMATOCRIT % 33.3* 31.7* 34.2   PLATELETS 10*3/mm3 332 358 450       Triglycerides   Date Value Ref Range Status   10/14/2023 192 (H) 0 - 150 mg/dL Final     Comment:     Falsely depressed results may occur on samples drawn from patients receiving N-Acetylcysteine (NAC) or Metamizole.       estimated creatinine clearance is 45.6 mL/min (by C-G formula based on SCr of 0.92 mg/dL).    Intake & Output (last 3 days)         10/12 0701  10/13 0700 10/13 0701  10/14 0700 10/14 0701  10/15 0700 10/15 0701  10/16 0700    I.V. (mL/kg) 1200 (20.7)   1200 (20.3)    IV Piggyback    500    Total Intake(mL/kg) 1200 (20.7)   1700 (28.8)    Urine  (mL/kg/hr) 0 (0) 550 (0.4)  115 (0.3)    Emesis/NG output 50   1000    Other    700    Blood    550    Total Output 50 550  2365    Net +1150 -550  -665            Urine Unmeasured Occurrence 3 x 3 x 1 x     Stool Unmeasured Occurrence 1 x 3 x 1 x             Dietitian Recommendations  Diet Orders (active) (From admission, onward)       Start     Ordered    10/14/23 1800  Adult Cyclic 2-in-1 TPN  Cyclic TPN - See Admin Instructions        Note to Pharmacy: Iqra Britt  : 12/15/1960  CSN: [id number]  5G - S560    10/14/23 1405    10/14/23 1800  Fat Emul Fish Oil/Plant Based (SMOFLIPID) 20 % emulsion 250 mL  Every 24 Hours Scheduled (TPN)         10/14/23 1405    10/14/23 1239  NPO Diet NPO Type: Strict NPO  Diet Effective Now         10/14/23 1240                    Current TPN Regimen Recommendation:  Dextrose 15% / Amino Acid 5% at goal rate of 60 mL/hr.  20% SmofLipid 200mL every 24 hours.    Assessment/Plan:  Pharmacy to dose TPN ordered for bowel obstruction. TPN started 10/14.  Initial macronutrient recommendations per RD. 15% D, 5% AA, to start at goal rate of 60 mL/hr. Will continue standard electrolyte concentrations.  Levemir increased to 15 units nightly today, moderate-high SSI continued.   Electrolyte replacements already ordered exogenously. Potassium phosphate replaced x1 dose today.   Pharmacy will continue to follow and adjust TPN as appropriate.    Francisco Roldan PharmD, BCPS  10/15/2023  13:53 EDT

## 2023-10-16 NOTE — PLAN OF CARE
Problem: Adult Inpatient Plan of Care  Goal: Plan of Care Review  Outcome: Ongoing, Progressing  Flowsheets (Taken 10/16/2023 0749)  Progress: improving  Plan of Care Reviewed With: patient  Goal: Patient-Specific Goal (Individualized)  Outcome: Ongoing, Progressing  Goal: Absence of Hospital-Acquired Illness or Injury  Outcome: Ongoing, Progressing  Intervention: Identify and Manage Fall Risk  Recent Flowsheet Documentation  Taken 10/16/2023 0600 by Merle Duff RN  Safety Promotion/Fall Prevention:   activity supervised   assistive device/personal items within reach   clutter free environment maintained   elopement precautions   fall prevention program maintained   safety round/check completed   room organization consistent   nonskid shoes/slippers when out of bed  Taken 10/16/2023 0500 by Merle Duff RN  Safety Promotion/Fall Prevention:   activity supervised   assistive device/personal items within reach   clutter free environment maintained   elopement precautions   fall prevention program maintained   safety round/check completed   room organization consistent   nonskid shoes/slippers when out of bed  Taken 10/16/2023 0400 by Merle Duff RN  Safety Promotion/Fall Prevention:   activity supervised   assistive device/personal items within reach   clutter free environment maintained   elopement precautions   fall prevention program maintained   safety round/check completed   room organization consistent   nonskid shoes/slippers when out of bed  Taken 10/16/2023 0300 by Merle Duff RN  Safety Promotion/Fall Prevention:   activity supervised   assistive device/personal items within reach   clutter free environment maintained   elopement precautions   fall prevention program maintained   safety round/check completed   room organization consistent   nonskid shoes/slippers when out of bed  Taken 10/16/2023 0200 by Merle Duff, RN  Safety Promotion/Fall Prevention:   activity supervised    assistive device/personal items within reach   clutter free environment maintained   elopement precautions   fall prevention program maintained   safety round/check completed   room organization consistent   nonskid shoes/slippers when out of bed  Taken 10/16/2023 0100 by Merle Duff RN  Safety Promotion/Fall Prevention:   activity supervised   assistive device/personal items within reach   clutter free environment maintained   elopement precautions   fall prevention program maintained   safety round/check completed   room organization consistent   nonskid shoes/slippers when out of bed  Taken 10/16/2023 0000 by Merle Duff RN  Safety Promotion/Fall Prevention:   activity supervised   assistive device/personal items within reach   clutter free environment maintained   elopement precautions   fall prevention program maintained   safety round/check completed   room organization consistent   nonskid shoes/slippers when out of bed  Taken 10/15/2023 2300 by Merle Duff RN  Safety Promotion/Fall Prevention:   activity supervised   assistive device/personal items within reach   clutter free environment maintained   elopement precautions   fall prevention program maintained   safety round/check completed   room organization consistent   nonskid shoes/slippers when out of bed  Taken 10/15/2023 2200 by Merle Duff RN  Safety Promotion/Fall Prevention:   activity supervised   assistive device/personal items within reach   clutter free environment maintained   elopement precautions   fall prevention program maintained   safety round/check completed   room organization consistent   nonskid shoes/slippers when out of bed  Taken 10/15/2023 2100 by Merle Duff RN  Safety Promotion/Fall Prevention:   activity supervised   assistive device/personal items within reach   clutter free environment maintained   elopement precautions   fall prevention program maintained   safety round/check completed   room  organization consistent   nonskid shoes/slippers when out of bed  Taken 10/15/2023 2000 by Merle Duff RN  Safety Promotion/Fall Prevention:   activity supervised   assistive device/personal items within reach   clutter free environment maintained   elopement precautions   fall prevention program maintained   safety round/check completed   room organization consistent   nonskid shoes/slippers when out of bed  Intervention: Prevent Skin Injury  Recent Flowsheet Documentation  Taken 10/16/2023 0600 by Merle Duff RN  Body Position:   turned   right  Skin Protection:   adhesive use limited   tubing/devices free from skin contact   transparent dressing maintained   skin-to-skin areas padded   skin-to-device areas padded   incontinence pads utilized  Taken 10/16/2023 0400 by Merle Duff RN  Body Position:   turned   left   upper extremity elevated   weight shifting  Skin Protection:   adhesive use limited   tubing/devices free from skin contact   transparent dressing maintained   skin-to-skin areas padded   skin-to-device areas padded   incontinence pads utilized  Taken 10/16/2023 0200 by Merle Duff RN  Body Position:   turned   right   weight shifting   upper extremity elevated  Skin Protection:   adhesive use limited   tubing/devices free from skin contact   transparent dressing maintained   skin-to-skin areas padded   skin-to-device areas padded   incontinence pads utilized  Taken 10/16/2023 0000 by Merle Duff RN  Body Position:   weight shifting   patient/family refused  Skin Protection:   adhesive use limited   tubing/devices free from skin contact   transparent dressing maintained   skin-to-skin areas padded   skin-to-device areas padded   incontinence pads utilized  Taken 10/15/2023 2200 by Merle Duff RN  Body Position:   turned   left   weight shifting   upper extremity elevated  Skin Protection:   adhesive use limited   tubing/devices free from skin contact   transparent  dressing maintained   skin-to-skin areas padded   skin-to-device areas padded   incontinence pads utilized  Taken 10/15/2023 2000 by Merle Duff RN  Body Position:   turned   right   upper extremity elevated   weight shifting   legs elevated  Skin Protection:   adhesive use limited   tubing/devices free from skin contact   transparent dressing maintained   skin-to-skin areas padded   skin-to-device areas padded   incontinence pads utilized  Intervention: Prevent and Manage VTE (Venous Thromboembolism) Risk  Recent Flowsheet Documentation  Taken 10/16/2023 0600 by Merle Duff RN  Activity Management: bedrest  Taken 10/16/2023 0400 by Merle Duff RN  Activity Management: bedrest  Taken 10/16/2023 0200 by Merle Duff RN  Activity Management: bedrest  Taken 10/16/2023 0000 by Merle Duff RN  Activity Management: bedrest  Taken 10/15/2023 2200 by Merle Duff RN  Activity Management: bedrest  Taken 10/15/2023 2000 by Merle Duff RN  Activity Management: bedrest  Intervention: Prevent Infection  Recent Flowsheet Documentation  Taken 10/16/2023 0600 by Merle Duff RN  Infection Prevention:   environmental surveillance performed   equipment surfaces disinfected   hand hygiene promoted   personal protective equipment utilized   rest/sleep promoted   single patient room provided  Taken 10/16/2023 0500 by Merle Duff RN  Infection Prevention:   environmental surveillance performed   equipment surfaces disinfected   hand hygiene promoted   personal protective equipment utilized   rest/sleep promoted   single patient room provided  Taken 10/16/2023 0400 by Merle Duff RN  Infection Prevention:   environmental surveillance performed   equipment surfaces disinfected   hand hygiene promoted   personal protective equipment utilized   rest/sleep promoted   single patient room provided  Taken 10/16/2023 0300 by Merle Duff RN  Infection Prevention:   environmental  surveillance performed   equipment surfaces disinfected   hand hygiene promoted   personal protective equipment utilized   rest/sleep promoted   single patient room provided  Taken 10/16/2023 0200 by Merle Duff RN  Infection Prevention:   environmental surveillance performed   equipment surfaces disinfected   hand hygiene promoted   personal protective equipment utilized   rest/sleep promoted   single patient room provided  Taken 10/16/2023 0100 by Merle Duff RN  Infection Prevention:   environmental surveillance performed   equipment surfaces disinfected   hand hygiene promoted   personal protective equipment utilized   rest/sleep promoted   single patient room provided  Taken 10/16/2023 0000 by Merle Duff RN  Infection Prevention:   environmental surveillance performed   equipment surfaces disinfected   hand hygiene promoted   personal protective equipment utilized   rest/sleep promoted   single patient room provided  Taken 10/15/2023 2300 by Merle Duff RN  Infection Prevention:   environmental surveillance performed   equipment surfaces disinfected   hand hygiene promoted   personal protective equipment utilized   rest/sleep promoted   single patient room provided  Taken 10/15/2023 2200 by Merle Duff RN  Infection Prevention:   environmental surveillance performed   equipment surfaces disinfected   hand hygiene promoted   personal protective equipment utilized   rest/sleep promoted   single patient room provided  Taken 10/15/2023 2100 by Merle Duff RN  Infection Prevention:   environmental surveillance performed   equipment surfaces disinfected   hand hygiene promoted   personal protective equipment utilized   rest/sleep promoted   single patient room provided  Taken 10/15/2023 2000 by Merle Duff RN  Infection Prevention:   environmental surveillance performed   equipment surfaces disinfected   hand hygiene promoted   personal protective equipment utilized    rest/sleep promoted   single patient room provided  Goal: Optimal Comfort and Wellbeing  Outcome: Ongoing, Progressing  Intervention: Monitor Pain and Promote Comfort  Recent Flowsheet Documentation  Taken 10/16/2023 0400 by Merle Duff RN  Pain Management Interventions:   care clustered   position adjusted   pillow support provided   no interventions per patient request   unnecessary movement minimized  Taken 10/16/2023 0200 by Merle Duff RN  Pain Management Interventions:   care clustered   position adjusted   pillow support provided   no interventions per patient request   unnecessary movement minimized  Taken 10/16/2023 0000 by Merle Duff RN  Pain Management Interventions:   care clustered   position adjusted   pillow support provided   no interventions per patient request   unnecessary movement minimized  Taken 10/15/2023 2200 by Merle Duff RN  Pain Management Interventions:   care clustered   position adjusted   pillow support provided   no interventions per patient request   unnecessary movement minimized  Taken 10/15/2023 2142 by Merle Duff RN  Pain Management Interventions: see MAR  Taken 10/15/2023 2000 by Merle Duff RN  Pain Management Interventions:   care clustered   position adjusted   pillow support provided   no interventions per patient request   unnecessary movement minimized  Intervention: Provide Person-Centered Care  Recent Flowsheet Documentation  Taken 10/16/2023 0400 by Merle Duff RN  Trust Relationship/Rapport:   care explained   reassurance provided   thoughts/feelings acknowledged  Taken 10/16/2023 0200 by Merle Duff RN  Trust Relationship/Rapport:   care explained   choices provided   emotional support provided   empathic listening provided   questions answered   questions encouraged   reassurance provided   thoughts/feelings acknowledged  Taken 10/16/2023 0000 by Merle Duff RN  Trust Relationship/Rapport:   care explained    choices provided   emotional support provided   empathic listening provided   questions answered   questions encouraged   reassurance provided   thoughts/feelings acknowledged  Taken 10/15/2023 2200 by Merle Duff, RN  Trust Relationship/Rapport:   care explained   reassurance provided   thoughts/feelings acknowledged  Taken 10/15/2023 2000 by Merle Duff, MALLORIE  Trust Relationship/Rapport:   care explained   choices provided   emotional support provided   empathic listening provided   questions answered   questions encouraged   reassurance provided   thoughts/feelings acknowledged  Goal: Readiness for Transition of Care  Outcome: Ongoing, Progressing   Goal Outcome Evaluation:  Plan of Care Reviewed With: patient        Progress: improving       Pt hypotensive overnight. 1LNS bolus given, 500mL bolus albumin given; pema maxed at 3mcg/kg/min. IVF maintenance started - infusing at 100mL/hr. Dilaudid x1 given for pain. Zofran x1 given for nausea this am. Blood cultures pending. Trending h/h q6h.

## 2023-10-16 NOTE — PROGRESS NOTES
INTENSIVIST   PROGRESS NOTE     Hospital:  LOS: 5 days     Ms. Iqra Britt, 72 y.o. female is followed for a Chief Complaint of: SBO      Subjective   S     Interval History:  Hypotensive overnight and on maximum dose Neosynephrine this AM. Getting a unit of blood.        The patient's relevant past medical, surgical and social history were reviewed and updated in Epic as appropriate.      ROS:   Constitutional: Negative for fever.   Respiratory: Negative for dyspnea.   Cardiovascular: Negative for chest pain.   Gastrointestinal: Negative for  nausea, vomiting and diarrhea.     Objective   O     Vitals:  Temp  Min: 96.8 °F (36 °C)  Max: 99.3 °F (37.4 °C)  BP  Min: 81/49  Max: 131/53  Pulse  Min: 91  Max: 141  Resp  Min: 12  Max: 20  SpO2  Min: 92 %  Max: 100 % Flow (L/min)  Min: 2  Max: 2    Intake/Ouptut 24 hrs (7:00AM - 6:59 AM)  Intake & Output (last 3 days)         10/13 0701  10/14 0700 10/14 0701  10/15 0700 10/15 0701  10/16 0700 10/16 0701  10/17 0700    I.V. (mL/kg)   3695.8 (65.2) 646.9 (11.4)    Blood    326.3    IV Piggyback   665     TPN   1466.2 271.6    Total Intake(mL/kg)   5827 (102.8) 1244.8 (22)    Urine (mL/kg/hr) 550 (0.4)  480 (0.4) 325 (1.1)    Emesis/NG output   1160 50    Other   700     Stool   10     Blood   550     Total Output 550  2900 375    Net -550  +2927 +869.8            Urine Unmeasured Occurrence 3 x 1 x      Stool Unmeasured Occurrence 3 x 1 x              Medications (drips):  Adult Central 2-in-1 TPN, Last Rate: 60 mL/hr at 10/15/23 1832  DOBUTamine, Last Rate: Stopped (10/15/23 2302)  Pharmacy to Dose TPN  phenylephrine, Last Rate: 1.8 mcg/kg/min (10/16/23 1137)  sodium chloride, Last Rate: 100 mL/hr (10/16/23 1055)        Physical Examination  Telemetry:  Sinus tachycardia.    Constitutional:  No acute distress.  Sitting up in bed. NGT in place.    Eyes: No scleral icterus.   PERRL, EOM intact.    Neck:  Supple, FROM   Cardiovascular: Normal rate, regular and  rhythm. Normal heart sounds.  No murmurs, gallop or rub.   Respiratory: No respiratory distress. Normal respiratory effort.  Normal breath sounds  Clear to auscultation   Abdominal:  Soft. No masses. Minimally tender.    Extremities: No digital cyanosis. No clubbing.  No peripheral edema.   Skin: No rashes, lesions or ulcers   Neurological:   Alert and interactive.                   Results from last 7 days   Lab Units 10/16/23  0413 10/16/23  0223 10/15/23  1551 10/15/23  0405 10/14/23  0430   WBC 10*3/mm3  --  18.21*  --  9.65 10.79   HEMOGLOBIN g/dL 7.4* 7.7* 8.7* 9.8* 9.8*   MCV fL  --  87.2  --  87.6 83.6   PLATELETS 10*3/mm3  --  341  --  332 358     Results from last 7 days   Lab Units 10/16/23  0223 10/15/23  1551 10/15/23  0508 10/14/23  2207 10/14/23  1255   SODIUM mmol/L 144  --  144  --  146*   POTASSIUM mmol/L 4.7 4.4 3.8   < > 3.5   CO2 mmol/L 17.0*  --  25.0  --  19.0*   CREATININE mg/dL 1.15*  --  0.92  --  0.99   GLUCOSE mg/dL 268*  --  217*  --  152*   MAGNESIUM mg/dL 2.0  --  2.4  --  2.3   PHOSPHORUS mg/dL 2.1*  --  2.5  --  4.0    < > = values in this interval not displayed.     Estimated Creatinine Clearance: 39.6 mL/min (A) (by C-G formula based on SCr of 1.15 mg/dL (H)).  Results from last 7 days   Lab Units 10/16/23  0223 10/14/23  1255 10/12/23  0416   ALK PHOS U/L 30* 54 46   BILIRUBIN mg/dL 0.2 0.5 0.4   BILIRUBIN DIRECT mg/dL  --  0.3  --    ALT (SGPT) U/L 9 12 10   AST (SGOT) U/L 22 47* 30       Results from last 7 days   Lab Units 10/16/23  0412   PH, ARTERIAL pH units 7.371   PCO2, ARTERIAL mm Hg 34.4*   PO2 ART mm Hg 85.6   FIO2 % 28       Images:  Imaging Results (Last 24 Hours)       ** No results found for the last 24 hours. **               Results: Reviewed.  I reviewed the patient's new laboratory and imaging results.  I independently reviewed the patient's new images.    Medications: Reviewed.    Assessment & Plan   A / P     Ms. 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 She was found to have a pelvic mass with ascites along with a SBO on CT abdomen.    She was admitted to Hospital Medicine. She underwent a paracentesis on 10/12 with negative cytology.     GYN Oncology evaluated and suspected ovarian cancer.     Despite medical management, her SBO worsened and she was taken to the OR on 10/15/23 and underwent ex-lap, ostomy, and right salpingo-oophorectomy.     She was admitted to the ICU postoperatively.     She remains on neosnephrine this AM.     Nutrition:   NPO Diet NPO Type: Strict NPO  Adult Central 2-in-1 TPN  Advance Directives:   Code Status and Medical Interventions:   Ordered at: 10/11/23 5842     Level Of Support Discussed With:    Patient     Code Status (Patient has no pulse and is not breathing):    CPR (Attempt to Resuscitate)     Medical Interventions (Patient has pulse or is breathing):    Full Support       Active Hospital Problems    Diagnosis     **SBO (small bowel obstruction)     Ovarian mass     COPD (chronic obstructive pulmonary disease)     Chronic HFrEF (heart failure with reduced ejection fraction)     CKD (chronic kidney disease), stage III     Type 2 diabetes mellitus        Assessment / Plan:    Transfuse 1 unit of pRBCs.   Titrate Neosynephrine.   Continue TPN.  Gentle IV fluids for now. Hold if respiratory status worsens.   Postoperative management per GynOnc.  Continue bear catheter. Monitor renal function  Replace phosphorus  Adjust insulin  AM labs    Remains critically ill secondary to hypotension with titration of vasopressors.     High level of risk due to:  severe exacerbation of chronic illness and illness with threat to life or bodily function.    Plan of care and goals reviewed during interdisciplinary rounds.  I discussed the patient's findings and my recommendations with patient, family, and nursing staff    Critical  Care Time: was greater than 32 minutes.(This excludes time spent performing separately reportable procedures and services). including high complexity decision making to assess, manipulate, and support vital organ system failure in this individual who has impairment of one or more vital organ systems such that there is a high probability of imminent or life threatening deterioration in the patient’s condition.      Regla Case, DO    Intensive Care Medicine and Pulmonary Medicine

## 2023-10-17 LAB
ANION GAP SERPL CALCULATED.3IONS-SCNC: 6 MMOL/L (ref 5–15)
BACTERIA SPEC ANAEROBE CULT: NORMAL
BH BB BLOOD EXPIRATION DATE: NORMAL
BH BB BLOOD EXPIRATION DATE: NORMAL
BH BB BLOOD TYPE BARCODE: 6200
BH BB BLOOD TYPE BARCODE: 6200
BH BB DISPENSE STATUS: NORMAL
BH BB DISPENSE STATUS: NORMAL
BH BB PRODUCT CODE: NORMAL
BH BB PRODUCT CODE: NORMAL
BH BB UNIT NUMBER: NORMAL
BH BB UNIT NUMBER: NORMAL
BUN SERPL-MCNC: 35 MG/DL (ref 8–23)
BUN/CREAT SERPL: 34 (ref 7–25)
CALCIUM SPEC-SCNC: 7.5 MG/DL (ref 8.6–10.5)
CHLORIDE SERPL-SCNC: 117 MMOL/L (ref 98–107)
CO2 SERPL-SCNC: 21 MMOL/L (ref 22–29)
CREAT SERPL-MCNC: 1.03 MG/DL (ref 0.57–1)
CROSSMATCH INTERPRETATION: NORMAL
CROSSMATCH INTERPRETATION: NORMAL
DEPRECATED RDW RBC AUTO: 49.3 FL (ref 37–54)
EGFRCR SERPLBLD CKD-EPI 2021: 57.9 ML/MIN/1.73
ERYTHROCYTE [DISTWIDTH] IN BLOOD BY AUTOMATED COUNT: 16.3 % (ref 12.3–15.4)
GLUCOSE BLDC GLUCOMTR-MCNC: 196 MG/DL (ref 70–130)
GLUCOSE BLDC GLUCOMTR-MCNC: 216 MG/DL (ref 70–130)
GLUCOSE BLDC GLUCOMTR-MCNC: 275 MG/DL (ref 70–130)
GLUCOSE BLDC GLUCOMTR-MCNC: 299 MG/DL (ref 70–130)
GLUCOSE BLDC GLUCOMTR-MCNC: 383 MG/DL (ref 70–130)
GLUCOSE BLDC GLUCOMTR-MCNC: 412 MG/DL (ref 70–130)
GLUCOSE SERPL-MCNC: 299 MG/DL (ref 65–99)
HCT VFR BLD AUTO: 27.7 % (ref 34–46.6)
HCT VFR BLD AUTO: 28.9 % (ref 34–46.6)
HGB BLD-MCNC: 8.8 G/DL (ref 12–15.9)
HGB BLD-MCNC: 9 G/DL (ref 12–15.9)
MAGNESIUM SERPL-MCNC: 2.1 MG/DL (ref 1.6–2.4)
MCH RBC QN AUTO: 26.4 PG (ref 26.6–33)
MCHC RBC AUTO-ENTMCNC: 31.1 G/DL (ref 31.5–35.7)
MCV RBC AUTO: 84.8 FL (ref 79–97)
PHOSPHATE SERPL-MCNC: 3 MG/DL (ref 2.5–4.5)
PLATELET # BLD AUTO: 190 10*3/MM3 (ref 140–450)
PMV BLD AUTO: 11.4 FL (ref 6–12)
POTASSIUM SERPL-SCNC: 4.5 MMOL/L (ref 3.5–5.2)
RBC # BLD AUTO: 3.41 10*6/MM3 (ref 3.77–5.28)
SODIUM SERPL-SCNC: 144 MMOL/L (ref 136–145)
UNIT  ABO: NORMAL
UNIT  ABO: NORMAL
UNIT  RH: NORMAL
UNIT  RH: NORMAL
WBC NRBC COR # BLD: 14.67 10*3/MM3 (ref 3.4–10.8)

## 2023-10-17 PROCEDURE — 99232 SBSQ HOSP IP/OBS MODERATE 35: CPT | Performed by: INTERNAL MEDICINE

## 2023-10-17 PROCEDURE — 97530 THERAPEUTIC ACTIVITIES: CPT

## 2023-10-17 PROCEDURE — 63710000001 INSULIN REGULAR HUMAN PER 5 UNITS: Performed by: OBSTETRICS & GYNECOLOGY

## 2023-10-17 PROCEDURE — 25010000002 CEFTRIAXONE PER 250 MG: Performed by: NURSE PRACTITIONER

## 2023-10-17 PROCEDURE — 97535 SELF CARE MNGMENT TRAINING: CPT

## 2023-10-17 PROCEDURE — 83735 ASSAY OF MAGNESIUM: CPT | Performed by: OBSTETRICS & GYNECOLOGY

## 2023-10-17 PROCEDURE — 85014 HEMATOCRIT: CPT | Performed by: NURSE PRACTITIONER

## 2023-10-17 PROCEDURE — 97168 OT RE-EVAL EST PLAN CARE: CPT

## 2023-10-17 PROCEDURE — 25010000002 METRONIDAZOLE 500 MG/100ML SOLUTION: Performed by: NURSE PRACTITIONER

## 2023-10-17 PROCEDURE — 25010000002 POTASSIUM CHLORIDE PER 2 MEQ OF POTASSIUM

## 2023-10-17 PROCEDURE — 25010000002 THIAMINE PER 100 MG

## 2023-10-17 PROCEDURE — 80048 BASIC METABOLIC PNL TOTAL CA: CPT | Performed by: OBSTETRICS & GYNECOLOGY

## 2023-10-17 PROCEDURE — 25010000002 HEPARIN (PORCINE) PER 1000 UNITS: Performed by: OBSTETRICS & GYNECOLOGY

## 2023-10-17 PROCEDURE — 82948 REAGENT STRIP/BLOOD GLUCOSE: CPT

## 2023-10-17 PROCEDURE — 85018 HEMOGLOBIN: CPT | Performed by: NURSE PRACTITIONER

## 2023-10-17 PROCEDURE — 63710000001 INSULIN DETEMIR PER 5 UNITS: Performed by: INTERNAL MEDICINE

## 2023-10-17 PROCEDURE — 97110 THERAPEUTIC EXERCISES: CPT

## 2023-10-17 PROCEDURE — 25010000002 MAGNESIUM SULFATE PER 500 MG OF MAGNESIUM

## 2023-10-17 PROCEDURE — 63710000001 INSULIN REGULAR HUMAN PER 5 UNITS: Performed by: INTERNAL MEDICINE

## 2023-10-17 PROCEDURE — 25010000002 CALCIUM GLUCONATE PER 10 ML

## 2023-10-17 PROCEDURE — 25010000002 HYDROMORPHONE PER 4 MG: Performed by: INTERNAL MEDICINE

## 2023-10-17 PROCEDURE — 85027 COMPLETE CBC AUTOMATED: CPT | Performed by: INTERNAL MEDICINE

## 2023-10-17 PROCEDURE — 84100 ASSAY OF PHOSPHORUS: CPT | Performed by: OBSTETRICS & GYNECOLOGY

## 2023-10-17 RX ADMIN — INSULIN DETEMIR 10 UNITS: 100 INJECTION, SOLUTION SUBCUTANEOUS at 10:18

## 2023-10-17 RX ADMIN — FAMOTIDINE 20 MG: 10 INJECTION, SOLUTION INTRAVENOUS at 08:30

## 2023-10-17 RX ADMIN — SPIRONOLACTONE 25 MG: 25 TABLET ORAL at 08:43

## 2023-10-17 RX ADMIN — INSULIN HUMAN 5 UNITS: 100 INJECTION, SOLUTION PARENTERAL at 12:16

## 2023-10-17 RX ADMIN — INSULIN HUMAN 5 UNITS: 100 INJECTION, SOLUTION PARENTERAL at 18:15

## 2023-10-17 RX ADMIN — SODIUM CHLORIDE 1000 MG: 900 INJECTION INTRAVENOUS at 04:21

## 2023-10-17 RX ADMIN — SACUBITRIL AND VALSARTAN 1 TABLET: 24; 26 TABLET, FILM COATED ORAL at 08:43

## 2023-10-17 RX ADMIN — METRONIDAZOLE 500 MG: 500 INJECTION, SOLUTION INTRAVENOUS at 14:09

## 2023-10-17 RX ADMIN — THIAMINE HYDROCHLORIDE 100 MG: 100 INJECTION, SOLUTION INTRAMUSCULAR; INTRAVENOUS at 08:30

## 2023-10-17 RX ADMIN — FOLIC ACID 1 MG: 5 INJECTION, SOLUTION INTRAMUSCULAR; INTRAVENOUS; SUBCUTANEOUS at 08:28

## 2023-10-17 RX ADMIN — HYDROMORPHONE HYDROCHLORIDE 0.25 MG: 1 INJECTION, SOLUTION INTRAMUSCULAR; INTRAVENOUS; SUBCUTANEOUS at 04:15

## 2023-10-17 RX ADMIN — HEPARIN SODIUM 5000 UNITS: 5000 INJECTION INTRAVENOUS; SUBCUTANEOUS at 05:34

## 2023-10-17 RX ADMIN — HEPARIN SODIUM 5000 UNITS: 5000 INJECTION INTRAVENOUS; SUBCUTANEOUS at 22:16

## 2023-10-17 RX ADMIN — SACUBITRIL AND VALSARTAN 1 TABLET: 24; 26 TABLET, FILM COATED ORAL at 20:39

## 2023-10-17 RX ADMIN — Medication 1 SPRAY: at 15:35

## 2023-10-17 RX ADMIN — Medication 10 ML: at 08:44

## 2023-10-17 RX ADMIN — POTASSIUM PHOSPHATE, MONOBASIC POTASSIUM PHOSPHATE, DIBASIC: 224; 236 INJECTION, SOLUTION, CONCENTRATE INTRAVENOUS at 17:45

## 2023-10-17 RX ADMIN — IVABRADINE 7.5 MG: 5 TABLET, FILM COATED ORAL at 08:43

## 2023-10-17 RX ADMIN — METRONIDAZOLE 500 MG: 500 INJECTION, SOLUTION INTRAVENOUS at 22:16

## 2023-10-17 RX ADMIN — DONEPEZIL HYDROCHLORIDE 5 MG: 5 TABLET, FILM COATED ORAL at 20:39

## 2023-10-17 RX ADMIN — SERTRALINE HYDROCHLORIDE 75 MG: 25 TABLET ORAL at 08:43

## 2023-10-17 RX ADMIN — METRONIDAZOLE 500 MG: 500 INJECTION, SOLUTION INTRAVENOUS at 05:34

## 2023-10-17 RX ADMIN — ACETAMINOPHEN 650 MG: 325 TABLET ORAL at 23:55

## 2023-10-17 RX ADMIN — INSULIN HUMAN 3 UNITS: 100 INJECTION, SOLUTION PARENTERAL at 23:55

## 2023-10-17 RX ADMIN — HYDROMORPHONE HYDROCHLORIDE 0.25 MG: 1 INJECTION, SOLUTION INTRAMUSCULAR; INTRAVENOUS; SUBCUTANEOUS at 14:08

## 2023-10-17 RX ADMIN — INSULIN DETEMIR 10 UNITS: 100 INJECTION, SOLUTION SUBCUTANEOUS at 20:39

## 2023-10-17 RX ADMIN — INSULIN HUMAN 5 UNITS: 100 INJECTION, SOLUTION PARENTERAL at 23:55

## 2023-10-17 RX ADMIN — Medication 1 SPRAY: at 04:16

## 2023-10-17 RX ADMIN — HEPARIN SODIUM 5000 UNITS: 5000 INJECTION INTRAVENOUS; SUBCUTANEOUS at 14:08

## 2023-10-17 RX ADMIN — INSULIN HUMAN 10 UNITS: 100 INJECTION, SOLUTION PARENTERAL at 00:21

## 2023-10-17 RX ADMIN — IVABRADINE 7.5 MG: 5 TABLET, FILM COATED ORAL at 17:53

## 2023-10-17 RX ADMIN — INSULIN HUMAN 8 UNITS: 100 INJECTION, SOLUTION PARENTERAL at 12:17

## 2023-10-17 RX ADMIN — Medication 10 ML: at 20:39

## 2023-10-17 RX ADMIN — SMOFLIPID 200 ML: 6; 6; 5; 3 INJECTION, EMULSION INTRAVENOUS at 17:50

## 2023-10-17 RX ADMIN — INSULIN HUMAN 5 UNITS: 100 INJECTION, SOLUTION PARENTERAL at 18:16

## 2023-10-17 RX ADMIN — INSULIN HUMAN 8 UNITS: 100 INJECTION, SOLUTION PARENTERAL at 05:34

## 2023-10-17 NOTE — PROGRESS NOTES
Pharmacy Parenteral Nutrition Evaluation    Iqra Britt is a  72 y.o. female receiving TPN.     Indication: Bowel Obstruction  Consulting Physician: Dr Draper    Total Fluid Rate (if stated): N/A    Labs  Results from last 7 days   Lab Units 10/17/23  0546 10/16/23  1246 10/16/23  0223 10/14/23  2207 10/14/23  1255   SODIUM mmol/L 144 148* 144   < > 146*   POTASSIUM mmol/L 4.5 3.9 4.7   < > 3.5   CHLORIDE mmol/L 117* 123* 116*   < > 107   CO2 mmol/L 21.0* 16.0* 17.0*   < > 19.0*   BUN mg/dL 35* 34* 38*   < > 34*   CREATININE mg/dL 1.03* 0.92 1.15*   < > 0.99   CALCIUM mg/dL 7.5* 6.2* 6.9*   < > 8.1*   BILIRUBIN mg/dL  --  0.6 0.2  --  0.5   ALK PHOS U/L  --  45 30*  --  54   ALT (SGPT) U/L  --  16 9  --  12   AST (SGOT) U/L  --  59* 22  --  47*   GLUCOSE mg/dL 299* 211* 268*   < > 152*    < > = values in this interval not displayed.       Results from last 7 days   Lab Units 10/17/23  0546 10/16/23  1246 10/16/23  0223 10/15/23  0508 10/14/23  1255   MAGNESIUM mg/dL 2.1  --  2.0 2.4 2.3   PHOSPHORUS mg/dL 3.0 2.3* 2.1* 2.5 4.0   PREALBUMIN mg/dL  --  5.0*  --   --  7.3*       Results from last 7 days   Lab Units 10/17/23  0546 10/17/23  0028 10/16/23  1818 10/16/23  0413 10/16/23  0223   WBC 10*3/mm3 14.67*  --  14.16*  --  18.21*   HEMOGLOBIN g/dL 9.0* 8.8* 8.9*   < > 7.7*   HEMATOCRIT % 28.9* 27.7* 29.0*   < > 25.3*   PLATELETS 10*3/mm3 190  --  194  --  341    < > = values in this interval not displayed.       Triglycerides   Date Value Ref Range Status   10/16/2023 157 (H) 0 - 150 mg/dL Final     Comment:     Falsely depressed results may occur on samples drawn from patients receiving N-Acetylcysteine (NAC) or Metamizole.       estimated creatinine clearance is 44.2 mL/min (A) (by C-G formula based on SCr of 1.03 mg/dL (H)).    Intake & Output (last 3 days)         10/14 0701  10/15 0700 10/15 0701  10/16 0700 10/16 0701  10/17 0700 10/17 0701  10/18 0700    I.V. (mL/kg)  3695.8 (65.2) 994.9 (17.5)      Blood   646.3     NG/GT    80    IV Piggyback  665 350 50    TPN  1466.2 1397.6 282    Total Intake(mL/kg)  5827 (102.8) 3388.8 (59.8) 412 (7.3)    Urine (mL/kg/hr)  480 (0.4) 1265 (0.9) 175 (0.5)    Emesis/NG output  1160 175     Other  700      Stool  10 10     Blood  550      Total Output  2900 1450 175    Net  +2927 +1938.8 +237            Urine Unmeasured Occurrence 1 x  1 x     Stool Unmeasured Occurrence 1 x               Dietitian Recommendations  Diet Order   Procedures    Diet: Liquid Diets, Diabetic Diets; Clear Liquid; Consistent Carbohydrate; Fluid Consistency: Thin (IDDSI 0)       Current TPN Regimen Recommendation:  Dextrose 15% / Amino Acid 5% at goal rate of 60 mL/hr.  20% SmofLipid 200mL every 24 hours.    Assessment/Plan:  Pharmacy to dose TPN for bowel obstruction. TPN started 10/14.  Macronutrient recommendations per RD: 15% D, 5% AA, at goal rate of 60 mL/hr. Continue standard electrolyte concentrations.  Levemir increased, added prandial q6h, SSI continued.   Electrolyte replacements already ordered exogenously.   Pharmacy will continue to follow and adjust TPN as appropriate.    Aaron Valentine, PharmD  10/17/2023  12:49 EDT

## 2023-10-17 NOTE — CASE MANAGEMENT/SOCIAL WORK
Continued Stay Note  [facility]     Patient Name: Iqra Britt  MRN: [id number]  Today's Date: 10/17/2023    Admit Date: 10/11/2023    Plan: [facility]   Discharge Plan       Row Name 10/17/23 1350       Plan    Plan [facility]    Patient/Family in Agreement with Plan yes    Plan Comments I met with PHILIPPE Britt at the bedside. Therapy has recommended rehab for her. She is agreeable to this and would like a referral sent to [facility] in [facility] she lives. I will send this referral to [facility] today so they can follow her for medical readiness.  will continue to follow.    Final Discharge Disposition Code 62 - inpatient rehab facility                   Discharge Codes    No documentation.                 Expected Discharge Date and Time       Expected Discharge Date Expected Discharge Time    Oct 19, 2023               Haseeb Mejias RN

## 2023-10-17 NOTE — PROGRESS NOTES
Gynecologic Oncology   Daily Progress Note    Chief Complaint: s/p ex lap, colostomy, RSO    Subjective   Patient did OK overnight. Off pressors. Pain is improved on dilaudid.  She is not having nausea currently.  She has CLD with TPN running and NGT in place. Khan out and pt is voiding    Tolerated CLD yesterday    Inpatient Medications:     Current Facility-Administered Medications:     acetaminophen (TYLENOL) tablet 650 mg, 650 mg, Nasogastric, Q4H PRN, Dana Draper MD    Adult Central 2-in-1 TPN, , Intravenous, Continuous, Francisco Roldan, [facility], Last Rate: 60 mL/hr at 10/16/23 1737, New Bag at 10/16/23 1737    Adult Central 2-in-1 TPN, , Intravenous, Continuous, Aaron Valentine, PharmD    Calcium Replacement - Follow Nurse / BPA Driven Protocol, , Does not apply, PRN, Dana Draper MD    cefTRIAXone (ROCEPHIN) 1000 mg/100 mL 0.9% NS (MBP), 1,000 mg, Intravenous, Q24H, Staci Bales APRN, 1,000 mg at 10/17/23 0421    dextrose (D50W) (25 g/50 mL) IV injection 25 g, 25 g, Intravenous, Q15 Min PRN, Dana Draper MD    dextrose (GLUTOSE) oral gel 15 g, 15 g, Oral, Q15 Min PRN, Dana Draper MD    donepezil (ARICEPT) tablet 5 mg, 5 mg, Nasogastric, Nightly, Dana Draper MD, 5 mg at 10/16/23 2018    famotidine (PEPCID) injection 20 mg, 20 mg, Intravenous, Daily, Dana Draper MD, 20 mg at 10/17/23 0830    Fat Emul Fish Oil/Plant Based (SMOFLIPID) 20 % emulsion 200 mL, 200 mL, Intravenous, Q24H (TPN), Francisco Roldan, [facility], Last Rate: 16.67 mL/hr at 10/16/23 1747, 200 mL at 10/16/23 1747    folic acid 1 mg in sodium chloride 0.9 % 50 mL IVPB, 1 mg, Intravenous, Daily, Valentine, Jg E, PharmD, Last Rate: 100 mL/hr at 10/17/23 0828, 1 mg at 10/17/23 0828    glucagon (GLUCAGEN) injection 1 mg, 1 mg, Intramuscular, Q15 Min PRN, Dana Draper MD    heparin (porcine) 5000 UNIT/ML injection 5,000 Units, 5,000 Units, Subcutaneous, Q8H, Dana Draper MD, 5,000 Units  at 10/17/23 1408    hydrALAZINE (APRESOLINE) injection 10 mg, 10 mg, Intravenous, Q6H PRN, Dana Draper MD    HYDROmorphone (DILAUDID) injection 0.25 mg, 0.25 mg, Intravenous, Q4H PRN, Elizabeth Dietz MD, 0.25 mg at 10/17/23 1408    insulin detemir (LEVEMIR) injection 10 Units, 10 Units, Subcutaneous, BID, Case, Regla V., DO, 10 Units at 10/17/23 1018    insulin regular (humuLIN R,novoLIN R) injection 3-14 Units, 3-14 Units, Subcutaneous, Q6H, Dana Draper MD, 8 Units at 10/17/23 1217    insulin regular (humuLIN R,novoLIN R) injection 5 Units, 5 Units, Subcutaneous, Q6H, Case, Regla V., DO, 5 Units at 10/17/23 1216    ipratropium-albuterol (DUO-NEB) nebulizer solution 3 mL, 3 mL, Nebulization, Q4H PRN, Dana Draper MD    ivabradine HCl (CORLANOR) tablet 7.5 mg, 7.5 mg, Nasogastric, BID With Meals, Dana Draper MD, 7.5 mg at 10/17/23 0843    LORazepam (ATIVAN) injection 0.25 mg, 0.25 mg, Intravenous, Q6H PRN, Dana Draper MD    Magnesium Standard Dose Replacement - Follow Nurse / BPA Driven Protocol, , Does not apply, PRN, Dana Draper MD    metroNIDAZOLE (FLAGYL) IVPB 500 mg, 500 mg, Intravenous, Q8H, Staci Bales APRN, Last Rate: 100 mL/hr at 10/17/23 1409, 500 mg at 10/17/23 1409    ondansetron (ZOFRAN) injection 4 mg, 4 mg, Intravenous, Q6H PRN, Dana Draper MD, 4 mg at 10/16/23 0644    Pharmacy to Dose TPN, , Does not apply, Continuous PRN, Dana Draper MD    phenol (CHLORASEPTIC) 1.4 % liquid 1 spray, 1 spray, Mouth/Throat, Q2H PRN, Dana Draper MD, 1 spray at 10/17/23 1535    Phosphorus Replacement - Follow Nurse / BPA Driven Protocol, , Does not apply, Bonny ZENDEJAS Hope M., MD    Potassium Replacement - Follow Nurse / BPA Driven Protocol, , Does not apply, PRN, Dana Draper MD    sacubitril-valsartan (ENTRESTO) 24-26 MG tablet 1 tablet, 1 tablet, Nasogastric, BID, Dana Draper MD, 1 tablet at 10/17/23 0832     sertraline (ZOLOFT) tablet 75 mg, 75 mg, Nasogastric, Daily, Dana Draper MD, 75 mg at 10/17/23 0843    [COMPLETED] Insert Peripheral IV, , , Once **AND** sodium chloride 0.9 % flush 10 mL, 10 mL, Intravenous, PRN, Dana Draper MD    sodium chloride 0.9 % flush 10 mL, 10 mL, Intravenous, Q12H, Dana Draper MD, 10 mL at 10/17/23 0844    sodium chloride 0.9 % flush 10 mL, 10 mL, Intravenous, PRN, Dana Draper MD    sodium chloride 0.9 % infusion 40 mL, 40 mL, Intravenous, PRN, Dana Draper MD    spironolactone (ALDACTONE) tablet 25 mg, 25 mg, Nasogastric, Daily, Dana Draper MD, 25 mg at 10/17/23 0843    thiamine (B-1) injection 100 mg, 100 mg, Intravenous, Daily, Jg Grijalva PharmD, 100 mg at 10/17/23 0830     Objective   Temp:  [97.5 °F (36.4 °C)-99.9 °F (37.7 °C)] 97.5 °F (36.4 °C)  Heart Rate:  [] 94  Resp:  [16-20] 18  BP: ()/(38-82) 128/59  Vitals:    10/17/23 1200   BP: 128/59   Pulse: 94   Resp: 18   Temp: 97.5 °F (36.4 °C)   SpO2: 97%     I/O last 3 completed shifts:  In: 7049.5 [I.V.:3378.1; Blood:646.3; IV Piggyback:465]  Out: 1635 [Urine:1450; Emesis/NG output:175; Stool:10]                 GENERAL: Alert, well-appearing female in no apparent distress.    CARDIOVASCULAR: Normal rate, regular rhythm, no murmurs, rubs, or gallops.    RESPIRATORY: Clear to auscultation bilaterally, normal respiratory effort  GASTROINTESTINAL:  tender throughout, distended,. hypoactive bowel sounds.  Incision(s) CDI staples in place. Colostomy bag with 15cc. Ostomy site pink and viable  GENITOURINARY: Khan in place  SKIN:  Warm, dry, well-perfused.    PSYCHIATRIC: AO x3, with appropriate affect, normal thought processes  EXREMITIES: Symmetric. No peripheral edema.    Lab Results   Component Value Date    WBC 14.67 (H) 10/17/2023    HGB 9.0 (L) 10/17/2023    HCT 28.9 (L) 10/17/2023    MCV 84.8 10/17/2023     10/17/2023    NEUTROABS 12.33 (H) 10/16/2023    GLUCOSE  299 (H) 10/17/2023    BUN 35 (H) 10/17/2023    CREATININE 1.03 (H) 10/17/2023    EGFRIFNONA 34 (L) 05/02/2022    EGFRIFAFRI 41 (L) 05/02/2022     10/17/2023    K 4.5 10/17/2023     (H) 10/17/2023    CO2 21.0 (L) 10/17/2023    MG 2.1 10/17/2023    CALCIUM 7.5 (L) 10/17/2023         Assessment & Plan   Iqra Britt is a 72 y.o. female POD2 s/p ex lap, RSO, lysis of adhesions, descending colostomy on 10/15 in setting of SBO and new pelvic mass    1.  Post-operative care  -Routine care (IS use, continue IVF, VTE prophylaxis)  -continue current pain control regimen  -currently CLD with TPN and clamped NGT; will pull NGT as tolerated CLD yesterday. REG diet   -pathology pending from surgery, cytology negative from para   -off pressors  -continue strict Is and Os  -Hb 8.7-7.7-1u-7.4-7.0- 1u- 8.9-8.8-9.0  -leukocytosis likely postoperative    2. Malnutrition  -continue NGT, PICC and TPN    3.  Significant comorbidities  -FIORDALIZA in the context of CKD stage III; Cr 0.92this AM  -CHF, seen by cardiology.  Moderate risk for surgical intervention    -Type 2 diabetes mellitus  -COPD    4.   Disposition  - Rest of care per primary team         Alexandria Puente MD  10/17/23  15:47 EDT    I saw and evaluated the patient at about 8 AM. I agree with the findings and the plan of care as documented in the note.  Overall greatly improved compared to when I last saw her.  D/C NGT, advance diet, agree with nutrition that TPN can be weaned/discontinued  H/H stable  Await pathology    Dana Draper MD  10/18/23  12:41 EDT    ADDENDUM:  Dr. Fierro called me to discuss pathology.  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 Krukenberg tumor.  I notified the patient and her sons of the results.  Advised them this would be a stage IV malignancy and that I thought colon was the most likely primary given the mass of the left upper quadrant and large bowel obstruction encountered at the time of surgery.  We discussed pros and  cons of oncology consultation while she is here in the hospital given that she lives in [place].  Patient and family would like oncology consultation to review options while she is still hospitalized.  I ordered consultation.    Electronically signed by Dana Draper MD, 10/18/23, 6:32 PM EDT.

## 2023-10-17 NOTE — PROGRESS NOTES
INTENSIVIST   PROGRESS NOTE     Hospital:  LOS: 6 days     Ms. Iqra Britt, 72 y.o. female is followed for a Chief Complaint of: SBO      Subjective   S     Interval History:  No events overnight. Off vasopressors.        The patient's relevant past medical, surgical and social history were reviewed and updated in Epic as appropriate.      ROS:   Constitutional: Negative for fever.   Respiratory: Negative for dyspnea.   Cardiovascular: Negative for chest pain.   Gastrointestinal: Negative for  nausea, vomiting and diarrhea.     Objective   O     Vitals:  Temp  Min: 97.6 °F (36.4 °C)  Max: 99.9 °F (37.7 °C)  BP  Min: 85/44  Max: 120/56  Pulse  Min: 98  Max: 125  Resp  Min: 16  Max: 20  SpO2  Min: 88 %  Max: 100 % Flow (L/min)  Min: 2  Max: 2    Intake/Ouptut 24 hrs (7:00AM - 6:59 AM)  Intake & Output (last 3 days)         10/14 0701  10/15 0700 10/15 0701  10/16 0700 10/16 0701  10/17 0700 10/17 0701  10/18 0700    I.V. (mL/kg)  3695.8 (65.2) 994.9 (17.5)     Blood   646.3     NG/GT    80    IV Piggyback  665 350 50    TPN  1466.2 1397.6 282    Total Intake(mL/kg)  5827 (102.8) 3388.8 (59.8) 412 (7.3)    Urine (mL/kg/hr)  480 (0.4) 1265 (0.9) 175 (0.5)    Emesis/NG output  1160 175     Other  700      Stool  10 10     Blood  550      Total Output  2900 1450 175    Net  +2927 +1938.8 +237            Urine Unmeasured Occurrence 1 x  1 x     Stool Unmeasured Occurrence 1 x               Medications (drips):  Adult Central 2-in-1 TPN, Last Rate: 60 mL/hr at 10/16/23 1737  Pharmacy to Dose TPN  phenylephrine, Last Rate: Stopped (10/16/23 1345)        Physical Examination  Telemetry:  Sinus tachycardia.    Constitutional:  No acute distress.  Sitting up in a chair. NGT in place.    Eyes: No scleral icterus.   PERRL, EOM intact.    Neck:  Supple, FROM   Cardiovascular: Normal rate, regular and rhythm. Normal heart sounds.  No murmurs, gallop or rub.   Respiratory: No respiratory distress. Normal respiratory  effort.  Normal breath sounds  Clear to auscultation   Abdominal:  Soft. No masses. Minimally tender.    Extremities: No digital cyanosis. No clubbing.  No peripheral edema.   Skin: No rashes, lesions or ulcers   Neurological:   Alert and interactive.                   Results from last 7 days   Lab Units 10/17/23  0546 10/17/23  0028 10/16/23  1818 10/16/23  0413 10/16/23  0223   WBC 10*3/mm3 14.67*  --  14.16*  --  18.21*   HEMOGLOBIN g/dL 9.0* 8.8* 8.9*   < > 7.7*   MCV fL 84.8  --  85.8  --  87.2   PLATELETS 10*3/mm3 190  --  194  --  341    < > = values in this interval not displayed.     Results from last 7 days   Lab Units 10/17/23  0546 10/16/23  1246 10/16/23  0223 10/15/23  1551 10/15/23  0508   SODIUM mmol/L 144 148* 144  --  144   POTASSIUM mmol/L 4.5 3.9 4.7   < > 3.8   CO2 mmol/L 21.0* 16.0* 17.0*  --  25.0   CREATININE mg/dL 1.03* 0.92 1.15*  --  0.92   GLUCOSE mg/dL 299* 211* 268*  --  217*   MAGNESIUM mg/dL 2.1  --  2.0  --  2.4   PHOSPHORUS mg/dL 3.0 2.3* 2.1*  --  2.5    < > = values in this interval not displayed.     Estimated Creatinine Clearance: 44.2 mL/min (A) (by C-G formula based on SCr of 1.03 mg/dL (H)).  Results from last 7 days   Lab Units 10/16/23  1246 10/16/23  0223 10/14/23  1255   ALK PHOS U/L 45 30* 54   BILIRUBIN mg/dL 0.6 0.2 0.5   BILIRUBIN DIRECT mg/dL 0.2  --  0.3   ALT (SGPT) U/L 16 9 12   AST (SGOT) U/L 59* 22 47*       Results from last 7 days   Lab Units 10/16/23  0412 10/15/23  1049 10/15/23  1019   PH, ARTERIAL pH units 7.371 7.39 7.41   PCO2, ARTERIAL mm Hg 34.4*  --   --    PO2 ART mm Hg 85.6  --   --    FIO2 % 28  --   --        Images:  Imaging Results (Last 24 Hours)       ** No results found for the last 24 hours. **               Results: Reviewed.  I reviewed the patient's new laboratory and imaging results.  I independently reviewed the patient's new images.    Medications: Reviewed.    Assessment & Plan   A / P     Ms. 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 on 10/11/23 with nausea and vomiting. She was found to have a pelvic mass with ascites along with a SBO on CT abdomen.    She was admitted to Hospital Medicine. She underwent a paracentesis on 10/12 with negative cytology.     GYN Oncology evaluated and suspected ovarian cancer.     Despite medical management, her SBO worsened and she was taken to the OR on 10/15/23 and underwent ex-lap, ostomy, and right salpingo-oophorectomy.     She was admitted to the ICU postoperatively.     She was on neosynephrine postoperatively but this has now been weaned off.     Nutrition:   Adult Central 2-in-1 TPN  Diet: Liquid Diets, Diabetic Diets; Clear Liquid; Consistent Carbohydrate; Fluid Consistency: Thin (IDDSI 0)  Advance Directives:   Code Status and Medical Interventions:   Ordered at: 10/11/23 1301     Level Of Support Discussed With:    Patient     Code Status (Patient has no pulse and is not breathing):    CPR (Attempt to Resuscitate)     Medical Interventions (Patient has pulse or is breathing):    Full Support       Active Hospital Problems    Diagnosis     **SBO (small bowel obstruction)     Ovarian mass     COPD (chronic obstructive pulmonary disease)     Chronic HFrEF (heart failure with reduced ejection fraction)     CKD (chronic kidney disease), stage III     Type 2 diabetes mellitus        Assessment / Plan:    Continue TPN.  Postoperative management per GynOnc.  Clear liquid diet. NGT to remain in place.   Adjust insulin  AM labs  Okay to transfer to telemetry.       High level of risk due to:  severe exacerbation of chronic illness and illness with threat to life or bodily function.    Plan of care and goals reviewed during interdisciplinary rounds.  I discussed the patient's findings and my recommendations with patient and nursing staff      Regla Blulard DO    Intensive Care Medicine and Pulmonary Medicine

## 2023-10-17 NOTE — DISCHARGE PLACEMENT REQUEST
"Ophelia Williamson (72 y.o. Female)       Date of Birth   1950    Social Security Number       Address   [address] [facility] [address]    Home Phone   [phone]    MRN   [id number]       Rastafarian   Samaritan    Marital Status                               Admission Date   10/11/23    Admission Type   Emergency    Admitting Provider   Case, Regla BARRIOS DO    Attending Provider   Case, Regla BARRIOS DO    Department, Room/Bed   [facility] 2A ICU, N208/1       Discharge Date       Discharge Disposition       Discharge Destination                                 Attending Provider: Juan Miguel, Regla BARRIOS DO    Allergies: Paxil [Paroxetine]    Isolation: None   Infection: None   Code Status: CPR    Ht: 154.9 cm (61\")   Wt: 56.7 kg (125 lb)    Admission Cmt: None   Principal Problem: SBO (small bowel obstruction) [K56.609]                   Active Insurance as of 10/11/2023       Primary Coverage       Payor Plan Insurance Group Employer/Plan Group    MEDICARE MEDICARE A & B        Payor Plan Address Payor Plan Phone Number Payor Plan Fax Number Effective Dates    [address] [phone]  12/1/2015 - None Entered    [facility] [address]         Subscriber Name Subscriber Birth Date Member ID       OPHELIA WILLIAMSON 1950 [id number]               Secondary Coverage       Payor Plan Insurance Group Employer/Plan Group             Payor Plan Address Payor Plan Phone Number Payor Plan Fax Number Effective Dates    [address] [address] [phone]  11/10/1999 - None Entered    [facility] [address]         Subscriber Name Subscriber Birth Date Member ID       OPHELIA WILLIAMSON 1950 [id number]                     Emergency Contacts        (Rel.) Home Phone Work Phone Mobile Phone    WilliamsonChidi adams (Son) [phone] -- [phone]    BalwinderWill (Son) [phone] -- [phone]                 History & Physical        Eron Aguilar MD " at 10/12/23 1230            H&P reviewed. The patient was examined and there are no changes to the H&P.          Electronically signed by Eron Aguilar MD at 10/12/23 1230   Source Note              [facility] Medicine Services  HISTORY AND PHYSICAL    Patient Name: Iqra Britt  : 1950  MRN: [id number]  Primary Care Physician: Georgie Fernández DO  Date of admission: 10/11/2023      Subjective  Subjective   Chief Complaint:  Nausea/vomiting, abdominal distention    HPI:  Iqra Britt is a 72 y.o. female with PMHx significant for HFrEF s/p ICD (LVEF 26%), HTN, CKDIII, Arthritis, DMII, COPD who presents to [facility] with complaints of abdominal distention and nausea/vomiting, abdominal pain. Patient reports in May she developed worsening vaginal bleeding, she underwent endometrial biopsy that was negative for cancer. She continued to have on/off symptoms and underwent D&C on Monday that reportedly showed abnormal fluid in her uterus, pathology is still pending. About three weeks ago she developed progressive abdominal swelling and distention and over the course of the last few days she has developed increasing pain and nausea. Son notes significant weight loss and poor appetite over the last several months. CT in the ED with SBO and large ovarian mass w/ascites.      Personal History     Past Medical History:   Diagnosis Date    Arthritis     CHF (congestive heart failure)     COPD (chronic obstructive pulmonary disease)     Diabetes mellitus     Hyperlipidemia     Renal disorder            Past Surgical History:   Procedure Laterality Date    CHOLECYSTECTOMY      DILATATION AND CURETTAGE       Family History: family history is not on file.     Social History:  reports that she quit smoking about 35 years ago. Her smoking use included cigarettes. She has never used smokeless tobacco. She reports that she does not currently use alcohol. She reports that she does  not use drugs.  Social History     Social History Narrative    Not on file     Medications:  Available home medication information reviewed.  (Not in a hospital admission)      Allergies   Allergen Reactions    Paxil [Paroxetine] Anxiety     Objective  Objective   Vital Signs:   Temp:  [97.7 °F (36.5 °C)-98.3 °F (36.8 °C)] 98.3 °F (36.8 °C)  Heart Rate:  [103-125] 109  Resp:  [20] 20  BP: (119-133)/(73-91) 119/74       Physical Exam   Constitutional: Awake, alert  Eyes: PERRLA, sclerae anicteric, no conjunctival injection  HENT: NCAT, mucous membranes moist  Neck: Supple, no thyromegaly, no lymphadenopathy, trachea midline  Respiratory: Clear to auscultation bilaterally, nonlabored respirations   Cardiovascular: RRR, no murmurs, rubs, or gallops, palpable pedal pulses bilaterally  Gastrointestinal: significantly distended abdomen with ascites present, mostly non-tender  Musculoskeletal: No bilateral ankle edema, no clubbing or cyanosis to extremities  Psychiatric: Appropriate affect, cooperative  Neurologic: Oriented x 3, strength symmetric in all extremities, Cranial Nerves grossly intact to confrontation, speech clear  Skin: No rashes      Result Review:  I have personally reviewed the results from the time of this admission to 10/11/2023 17:22 EDT and agree with these findings:  [x]  Laboratory list / accordion  [x]  Microbiology  [x]  Radiology  [x]  EKG/Telemetry   [x]  Cardiology/Vascular   [x]  Pathology  [x]  Old records  []  Other:  Most notable findings include:         LAB RESULTS:      Lab 10/11/23  1340   WBC 14.40*   HEMOGLOBIN 12.0   HEMATOCRIT 38.9   PLATELETS 585*   NEUTROS ABS 13.15*   IMMATURE GRANS (ABS) 0.13*   LYMPHS ABS 0.25*   MONOS ABS 0.84   EOS ABS 0.00   MCV 82.4   PROTIME 16.9*   INR 1.36*         Lab 10/11/23  1354 10/11/23  1347 10/11/23  1340   SODIUM  --   --  133*   POTASSIUM  --   --  4.5   CHLORIDE  --   --  91*   CO2  --   --  22.0   ANION GAP  --   --  20.0*   BUN  --   --  58*    CREATININE 2.10 2.10* 1.72*   EGFR  --   --  31.3*   GLUCOSE  --   --  358*   CALCIUM  --   --  8.5*         Lab 10/11/23  1340   TOTAL PROTEIN 7.2   ALBUMIN 3.3*   GLOBULIN 3.9   ALT (SGPT) 13   AST (SGOT) 31   BILIRUBIN 0.4   ALK PHOS 56   LIPASE 7*         Lab 10/11/23  1340   PROBNP 1,889.0*   HSTROP T 28*               UA          10/11/2023    13:44   Urinalysis   Squamous Epithelial Cells, UA 21-30    Specific Gravity, UA 1.023    Ketones, UA Trace    Blood, UA Trace    Leukocytes, UA Moderate (2+)    Nitrite, UA Negative    RBC, UA 3-6    WBC, UA Too Numerous to Count    Bacteria, UA 1+      Microbiology Results (last 10 days)       ** No results found for the last 240 hours. **          CT Abdomen Pelvis Without Contrast    Result Date: 10/11/2023  CT ABDOMEN PELVIS WO CONTRAST Date of Exam: 10/11/2023 2:43 PM CDT Indication: abd pain, distension, recent dx of uterine mass, Cr 2.1. Comparison: None available. Technique: Axial CT images were obtained of the abdomen and pelvis without the administration of contrast. Reconstructed coronal and sagittal images were also obtained. Automated exposure control and iterative construction methods were used. Findings: LUNG BASES:  Unremarkable without mass or infiltrate. LIVER:  Unremarkable parenchyma without focal lesion. BILIARY/GALLBLADDER: Cholecystectomy SPLEEN:  Unremarkable PANCREAS:  Unremarkable ADRENAL:  Unremarkable KIDNEYS:  Unremarkable parenchyma with no solid mass identified. No obstruction.  No calculus identified. GASTROINTESTINAL/MESENTERY: Dilated small intestine measuring up to 4 cm in diameter with somewhat gradual tapering in the right central lower abdomen in the region of abdominopelvic mass. There is likely an element of mass effect/partial obstruction related to the mass. Distal small intestine is decompressed. There is a moderate proximal and mid fecal load. AORTA/IVC:  Normal caliber. RETROPERITONEUM/LYMPH NODES: There are enlarged  retroperitoneal lymph nodes including: *2.2 x 2.0 cm left para-aortic mid abdominal node (image 64, series 2) *1.5 x 1.4 cm left para-aortic lower abdominal node (image 70, series 2) REPRODUCTIVE: There is a heterogeneous 14.9 x 11.7 cm mass within the central upper pelvis/lower abdomen. While this is adjacent to the uterine fundus it does not appear to be in direct communication and is most suggestive of an ovarian neoplasm likely arising from the right ovary. There is a heterogeneously enlarged left ovary measuring 5.0 x 4.6 cm. BLADDER:  Unremarkable OSSEUS STRUCTURES:  Typical for age with no acute process identified. There is small to moderate volume ascites. There are areas of mesenteric spider webbing/infiltration with some suggestion of nodularity worrisome for carcinomatosis. However, this is difficult to assess given lack of intravenous contrast.     Impression: Impression: 1.Large central abdominopelvic mass, likely arising from the right ovary. 2.Dilated small intestine with somewhat gradual transition in the right anterior lower abdomen near the after mentioned mass suggestive of at least partial mass effect/obstruction. 3.Small to moderate volume ascites with imaging features concerning for carcinomatosis. 4.Enlarged retroperitoneal lymph nodes, likely metastatic. Electronically Signed: Tres Bauer MD  10/11/2023 3:09 PM CDT  Workstation ID: [id number]    XR Chest 1 View    Result Date: 10/11/2023  XR CHEST 1 VW Date of Exam: 10/11/2023 2:10 PM EDT Indication: abd pain, distension, recent dx of uterine cancer, ascites Comparison: None available. Findings: A left subclavian ICD is in place. The lungs are clear. The heart and mediastinal contours appear normal. There is no pleural effusion. The pulmonary vasculature appears normal. The osseous structures appear intact.     Impression: Impression: No acute cardiopulmonary process. Electronically Signed: Albert Rangel MD  10/11/2023 2:46 PM EDT   Workstation ID: [id number]         Assessment & Plan  Assessment & Plan     Active Hospital Problems    Diagnosis  POA    **Ovarian mass [N83.8]  Yes    COPD (chronic obstructive pulmonary disease) [J44.9]  Yes    Acute HFrEF (heart failure with reduced ejection fraction) [I50.21]  Yes    CKD (chronic kidney disease), stage III [N18.30]  Yes   Iqra Britt is a 72 y.o. female with PMHx significant for HFrEF/NICM s/p BiV ICD LVEF 26%), HTN, CKDIII, Arthritis, DMII, COPD who presents to [facility] with complaints of abdominal distention and nausea/vomiting, abdominal pain.    SBO:  - secondary to large ovarian mass  - Dr. Draper to evaluate, recommends placing NGT in ED  - NPO, gentle IVF for now  - PRN pain control    Large Pelvic Mass w/Ascites and concern for Carcinomatosis and metastatic LAD  - , CEA, CA-125 all significantly elevated per review of OSH records, recheck CA-125  - concern for primary Ovarian Malignancy w/Malignant Ascites  - Dr. Draper to evaluate, may need surgical debulking?   - recent negative endometrial bx 5/2023 and recent D&C Monday w/path still pending  - given her comorbidities, she is not a great surgical candidate    CKDIII  - baseline appears to be around 1.5?  - monitor closely  - avoid nephrotoxins, hold lasix while NPO    HFrEF/NICM s/p BiV ICD (LVEF 26% 9/2023)  - followed by Cardiology at   - does not appear to be on GDMT, will hold lasix for now due to NPO/SBO, need to have pharmacy clarify med rec, looks like she may have recently been started on Entresto, Ibravadine?  - monitor for volume overload, appears euvolemic currently    Possible UTI:  - sample contaminated with significant squamous cells, will repeat and treat if indicated    COPD:  - not in exacerbation  - PRN duo-nebs    DMII:  - SSI coverage while NPO    Total time spent: 80 minutes  Time spent includes time reviewing chart, face-to-face time, counseling patient/family/caregiver, ordering  medications/tests/procedures, communicating with other health care professionals, documenting clinical information in the electronic health record, and coordination of care.       DVT prophylaxis:  Heparin      CODE STATUS:  we discussed this extensively and she chooses to be full code at this time  Code Status and Medical Interventions:   Ordered at: 10/11/23 1534     Level Of Support Discussed With:    Patient     Code Status (Patient has no pulse and is not breathing):    CPR (Attempt to Resuscitate)     Medical Interventions (Patient has pulse or is breathing):    Full Support     Expected Discharge   Expected discharge date/ time has not been documented.     Judy Lujan DO  10/11/23      Electronically signed by Judy Lujan DO at 10/1950                 Judy Lujan DO at 10/11/23 369              [facility] Medicine Services  HISTORY AND PHYSICAL    Patient Name: Iqra Britt  : 1950  MRN: [id number]  Primary Care Physician: Georgie Fernández DO  Date of admission: 10/11/2023      Subjective  Subjective     Chief Complaint:  Nausea/vomiting, abdominal distention    HPI:  Iqra Britt is a 72 y.o. female with PMHx significant for HFrEF s/p ICD (LVEF 26%), HTN, CKDIII, Arthritis, DMII, COPD who presents to [facility] with complaints of abdominal distention and nausea/vomiting, abdominal pain. Patient reports in May she developed worsening vaginal bleeding, she underwent endometrial biopsy that was negative for cancer. She continued to have on/off symptoms and underwent D&C on Monday that reportedly showed abnormal fluid in her uterus, pathology is still pending. About three weeks ago she developed progressive abdominal swelling and distention and over the course of the last few days she has developed increasing pain and nausea. Son notes significant weight loss and poor appetite over the last several months. CT in the ED with SBO and large  ovarian mass w/ascites.      Personal History     Past Medical History:   Diagnosis Date    Arthritis     CHF (congestive heart failure)     COPD (chronic obstructive pulmonary disease)     Diabetes mellitus     Hyperlipidemia     Renal disorder              Past Surgical History:   Procedure Laterality Date    CHOLECYSTECTOMY      DILATATION AND CURETTAGE         Family History: family history is not on file.     Social History:  reports that she quit smoking about 35 years ago. Her smoking use included cigarettes. She has never used smokeless tobacco. She reports that she does not currently use alcohol. She reports that she does not use drugs.  Social History     Social History Narrative    Not on file       Medications:  Available home medication information reviewed.  (Not in a hospital admission)      Allergies   Allergen Reactions    Paxil [Paroxetine] Anxiety       Objective  Objective     Vital Signs:   Temp:  [97.7 °F (36.5 °C)-98.3 °F (36.8 °C)] 98.3 °F (36.8 °C)  Heart Rate:  [103-125] 109  Resp:  [20] 20  BP: (119-133)/(73-91) 119/74       Physical Exam   Constitutional: Awake, alert  Eyes: PERRLA, sclerae anicteric, no conjunctival injection  HENT: NCAT, mucous membranes moist  Neck: Supple, no thyromegaly, no lymphadenopathy, trachea midline  Respiratory: Clear to auscultation bilaterally, nonlabored respirations   Cardiovascular: RRR, no murmurs, rubs, or gallops, palpable pedal pulses bilaterally  Gastrointestinal: significantly distended abdomen with ascites present, mostly non-tender  Musculoskeletal: No bilateral ankle edema, no clubbing or cyanosis to extremities  Psychiatric: Appropriate affect, cooperative  Neurologic: Oriented x 3, strength symmetric in all extremities, Cranial Nerves grossly intact to confrontation, speech clear  Skin: No rashes      Result Review:  I have personally reviewed the results from the time of this admission to 10/11/2023 17:22 EDT and agree with these  findings:  [x]  Laboratory list / accordion  [x]  Microbiology  [x]  Radiology  [x]  EKG/Telemetry   [x]  Cardiology/Vascular   [x]  Pathology  [x]  Old records  []  Other:  Most notable findings include:         LAB RESULTS:      Lab 10/11/23  1340   WBC 14.40*   HEMOGLOBIN 12.0   HEMATOCRIT 38.9   PLATELETS 585*   NEUTROS ABS 13.15*   IMMATURE GRANS (ABS) 0.13*   LYMPHS ABS 0.25*   MONOS ABS 0.84   EOS ABS 0.00   MCV 82.4   PROTIME 16.9*   INR 1.36*         Lab 10/11/23  1354 10/11/23  1347 10/11/23  1340   SODIUM  --   --  133*   POTASSIUM  --   --  4.5   CHLORIDE  --   --  91*   CO2  --   --  22.0   ANION GAP  --   --  20.0*   BUN  --   --  58*   CREATININE 2.10 2.10* 1.72*   EGFR  --   --  31.3*   GLUCOSE  --   --  358*   CALCIUM  --   --  8.5*         Lab 10/11/23  1340   TOTAL PROTEIN 7.2   ALBUMIN 3.3*   GLOBULIN 3.9   ALT (SGPT) 13   AST (SGOT) 31   BILIRUBIN 0.4   ALK PHOS 56   LIPASE 7*         Lab 10/11/23  1340   PROBNP 1,889.0*   HSTROP T 28*                 UA          10/11/2023    13:44   Urinalysis   Squamous Epithelial Cells, UA 21-30    Specific Gravity, UA 1.023    Ketones, UA Trace    Blood, UA Trace    Leukocytes, UA Moderate (2+)    Nitrite, UA Negative    RBC, UA 3-6    WBC, UA Too Numerous to Count    Bacteria, UA 1+        Microbiology Results (last 10 days)       ** No results found for the last 240 hours. **            CT Abdomen Pelvis Without Contrast    Result Date: 10/11/2023  CT ABDOMEN PELVIS WO CONTRAST Date of Exam: 10/11/2023 2:43 PM CDT Indication: abd pain, distension, recent dx of uterine mass, Cr 2.1. Comparison: None available. Technique: Axial CT images were obtained of the abdomen and pelvis without the administration of contrast. Reconstructed coronal and sagittal images were also obtained. Automated exposure control and iterative construction methods were used. Findings: LUNG BASES:  Unremarkable without mass or infiltrate. LIVER:  Unremarkable parenchyma without focal  lesion. BILIARY/GALLBLADDER: Cholecystectomy SPLEEN:  Unremarkable PANCREAS:  Unremarkable ADRENAL:  Unremarkable KIDNEYS:  Unremarkable parenchyma with no solid mass identified. No obstruction.  No calculus identified. GASTROINTESTINAL/MESENTERY: Dilated small intestine measuring up to 4 cm in diameter with somewhat gradual tapering in the right central lower abdomen in the region of abdominopelvic mass. There is likely an element of mass effect/partial obstruction related to the mass. Distal small intestine is decompressed. There is a moderate proximal and mid fecal load. AORTA/IVC:  Normal caliber. RETROPERITONEUM/LYMPH NODES: There are enlarged retroperitoneal lymph nodes including: *2.2 x 2.0 cm left para-aortic mid abdominal node (image 64, series 2) *1.5 x 1.4 cm left para-aortic lower abdominal node (image 70, series 2) REPRODUCTIVE: There is a heterogeneous 14.9 x 11.7 cm mass within the central upper pelvis/lower abdomen. While this is adjacent to the uterine fundus it does not appear to be in direct communication and is most suggestive of an ovarian neoplasm likely arising from the right ovary. There is a heterogeneously enlarged left ovary measuring 5.0 x 4.6 cm. BLADDER:  Unremarkable OSSEUS STRUCTURES:  Typical for age with no acute process identified. There is small to moderate volume ascites. There are areas of mesenteric spider webbing/infiltration with some suggestion of nodularity worrisome for carcinomatosis. However, this is difficult to assess given lack of intravenous contrast.     Impression: Impression: 1.Large central abdominopelvic mass, likely arising from the right ovary. 2.Dilated small intestine with somewhat gradual transition in the right anterior lower abdomen near the after mentioned mass suggestive of at least partial mass effect/obstruction. 3.Small to moderate volume ascites with imaging features concerning for carcinomatosis. 4.Enlarged retroperitoneal lymph nodes, likely  metastatic. Electronically Signed: Tres Bauer MD  10/11/2023 3:09 PM CDT  Workstation ID: [id number]    XR Chest 1 View    Result Date: 10/11/2023  XR CHEST 1 VW Date of Exam: 10/11/2023 2:10 PM EDT Indication: abd pain, distension, recent dx of uterine cancer, ascites Comparison: None available. Findings: A left subclavian ICD is in place. The lungs are clear. The heart and mediastinal contours appear normal. There is no pleural effusion. The pulmonary vasculature appears normal. The osseous structures appear intact.     Impression: Impression: No acute cardiopulmonary process. Electronically Signed: Albert Rangel MD  10/11/2023 2:46 PM EDT  Workstation ID: [id number]         Assessment & Plan  Assessment & Plan     Active Hospital Problems    Diagnosis  POA    **Ovarian mass [N83.8]  Yes    COPD (chronic obstructive pulmonary disease) [J44.9]  Yes    Acute HFrEF (heart failure with reduced ejection fraction) [I50.21]  Yes    CKD (chronic kidney disease), stage III [N18.30]  Yes     Iqra Britt is a 72 y.o. female with PMHx significant for HFrEF/NICM s/p BiV ICD LVEF 26%), HTN, CKDIII, Arthritis, DMII, COPD who presents to [facility] with complaints of abdominal distention and nausea/vomiting, abdominal pain.    SBO:  - secondary to large ovarian mass  - Dr. Draper to evaluate, recommends placing NGT in ED  - NPO, gentle IVF for now  - PRN pain control    Large Pelvic Mass w/Ascites and concern for Carcinomatosis and metastatic LAD  - , CEA, CA-125 all significantly elevated per review of OSH records, recheck CA-125  - concern for primary Ovarian Malignancy w/Malignant Ascites  - Dr. Draper to evaluate, may need surgical debulking?   - recent negative endometrial bx 5/2023 and recent D&C Monday w/path still pending  - given her comorbidities, she is not a great surgical candidate    CKDIII  - baseline appears to be around 1.5?  - monitor closely  - avoid nephrotoxins, hold lasix while  NPO    HFrEF/NICM s/p BiV ICD (LVEF 26% 9/2023)  - followed by Cardiology at   - does not appear to be on GDMT, will hold lasix for now due to NPO/SBO, need to have pharmacy clarify med rec, looks like she may have recently been started on Entresto, Ibravadine?  - monitor for volume overload, appears euvolemic currently    Possible UTI:  - sample contaminated with significant squamous cells, will repeat and treat if indicated    COPD:  - not in exacerbation  - PRN duo-nebs    DMII:  - SSI coverage while NPO    Total time spent: 80 minutes  Time spent includes time reviewing chart, face-to-face time, counseling patient/family/caregiver, ordering medications/tests/procedures, communicating with other health care professionals, documenting clinical information in the electronic health record, and coordination of care.       DVT prophylaxis:  Heparin      CODE STATUS:  we discussed this extensively and she chooses to be full code at this time  Code Status and Medical Interventions:   Ordered at: 10/11/23 4839     Level Of Support Discussed With:    Patient     Code Status (Patient has no pulse and is not breathing):    CPR (Attempt to Resuscitate)     Medical Interventions (Patient has pulse or is breathing):    Full Support       Expected Discharge   Expected discharge date/ time has not been documented.     Judy Lujan DO  10/11/23      Electronically signed by Judy Lujan DO at 10/1950          Physical Therapy Notes (last 24 hours)        Saundra Benitez, PT at 10/17/23 0937  Version 1 of 1         Goal Outcome Evaluation:  Plan of Care Reviewed With: patient        Progress: improving  Outcome Evaluation: Pt showing improvement as she increased ambulation distance 10ft + 20ft with RWx and min A, limited by incisional pain and generalized weakness compared to baseline. d/c rec for IRF.      Anticipated Discharge Disposition (PT): inpatient rehabilitation facility    Electronically signed by Emmanuel  Saundra, PT at 10/17/23 1123       Saundra Benitez, PT at 10/17/23 0940  Version 1 of 1         Patient Name: Iqra Britt  : 1950    MRN: [id number]                              Today's Date: 10/17/2023       Admit Date: 10/11/2023    Visit Dx:     ICD-10-CM ICD-9-CM   1. Pelvic mass  R19.00 789.30   2. Partial intestinal obstruction, unspecified cause  K56.600 560.9   3. Nausea and vomiting, unspecified vomiting type  R11.2 787.01   4. Other ascites  R18.8 789.59   5. Hyperglycemia  R73.9 790.29   6. Renal insufficiency  N28.9 593.9   7. SBO (small bowel obstruction)  K56.609 560.9     Patient Active Problem List   Diagnosis    Ovarian mass    COPD (chronic obstructive pulmonary disease)    Chronic HFrEF (heart failure with reduced ejection fraction)    CKD (chronic kidney disease), stage III    Type 2 diabetes mellitus    SBO (small bowel obstruction)     Past Medical History:   Diagnosis Date    Arthritis     CHF (congestive heart failure)     COPD (chronic obstructive pulmonary disease)     Diabetes mellitus     Hyperlipidemia     Renal disorder      Past Surgical History:   Procedure Laterality Date    CHOLECYSTECTOMY      DILATATION AND CURETTAGE      EXPLORATORY LAPAROTOMY N/A 10/15/2023    Procedure: LAPAROTOMY EXPLORATORY, RIGHT SALPINGO OOPHORECTOMY, LYSIS OF ADHESIONS, DESCENDING COLOSOTOMY;  Surgeon: Dana Draper MD;  Location: [facility];  Service: Gynecology Oncology;  Laterality: N/A;      General Information       Row Name 10/17/23 1118          Physical Therapy Time and Intention    Document Type therapy note (daily note)  -AY     Mode of Treatment physical therapy  -AY       Row Name 10/17/23 1118          General Information    Patient Profile Reviewed yes  -AY     Existing Precautions/Restrictions fall;other (see comments);oxygen therapy device and L/min  NG, abd incision w/ binder, colostomy  -AY     Barriers to Rehab medically complex  -AY       Row Name 10/17/23 1118           Cognition    Orientation Status (Cognition) oriented x 3  -AY       Row Name 10/17/23 1118          Safety Issues, Functional Mobility    Safety Issues Affecting Function (Mobility) insight into deficits/self-awareness;sequencing abilities  -AY     Impairments Affecting Function (Mobility) balance;endurance/activity tolerance;shortness of breath;strength;pain  -AY               User Key  (r) = Recorded By, (t) = Taken By, (c) = Cosigned By      Initials Name Provider Type    Saundra Georges PT Physical Therapist                   Mobility       Row Name 10/17/23 1119          Sit-Stand Transfer    Sit-Stand [place] (Transfers) 1 person assist;minimum assist (75% patient effort);verbal cues  -AY     Assistive Device (Sit-Stand Transfers) walker, front-wheeled  -AY     Comment, (Sit-Stand Transfer) performed x3 reps.  -AY       Row Name 10/17/23 1119          Gait/Stairs (Locomotion)    [place] Level (Gait) minimum assist (75% patient effort);1 person assist;verbal cues;nonverbal cues (demo/gesture)  +chair follow  -AY     Assistive Device (Gait) walker, front-wheeled  -AY     Distance in Feet (Gait) 10+20  -AY     Deviations/Abnormal Patterns (Gait) bilateral deviations;base of support, narrow;bhavana decreased;gait speed decreased;stride length decreased  -AY     Bilateral Gait Deviations heel strike decreased;forward flexed posture  -AY     Comment, (Gait/Stairs) decreased stride length noted with shuffling gait pattern. one seated rest break required. close chair follow required d/t decreased endurance and increased incisional pain.  -AY               User Key  (r) = Recorded By, (t) = Taken By, (c) = Cosigned By      Initials Name Provider Type    Saundra Georges PT Physical Therapist                   Obj/Interventions       Row Name 10/17/23 1120          Motor Skills    Therapeutic Exercise knee;ankle;hip  -AY       Row Name 10/17/23 1120          Hip (Therapeutic Exercise)    Hip (Therapeutic  Exercise) strengthening exercise  -AY     Hip Strengthening (Therapeutic Exercise) bilateral;10 repetitions;sitting;marching while seated  -AY       Row Name 10/17/23 1120          Knee (Therapeutic Exercise)    Knee (Therapeutic Exercise) strengthening exercise  -AY     Knee Strengthening (Therapeutic Exercise) bilateral;LAQ (long arc quad);10 repetitions;sitting  -AY       Row Name 10/17/23 1120          Ankle (Therapeutic Exercise)    Ankle (Therapeutic Exercise) AROM (active range of motion)  -AY     Ankle AROM (Therapeutic Exercise) bilateral;dorsiflexion;plantarflexion;10 repetitions;sitting  -AY       Row Name 10/17/23 1120          Balance    Balance Assessment sitting static balance;sitting dynamic balance;sit to stand dynamic balance;standing static balance;standing dynamic balance  -AY     Static Sitting Balance contact guard  -AY     Dynamic Sitting Balance contact guard  -AY     Position, Sitting Balance unsupported;sitting in chair  -AY     Sit to Stand Dynamic Balance minimal assist  -AY     Static Standing Balance minimal assist  -AY     Dynamic Standing Balance minimal assist  -AY     Position/Device Used, Standing Balance supported;walker, rolling  -AY               User Key  (r) = Recorded By, (t) = Taken By, (c) = Cosigned By      Initials Name Provider Type    AY Saundra Benitez, PT Physical Therapist                   Goals/Plan    No documentation.                  Clinical Impression       Row Name 10/17/23 1121          Pain    Pretreatment Pain Rating 3/10  -AY     Posttreatment Pain Rating 5/10  -AY     Pain Location incisional  -AY     Pain Location - abdomen  -AY     Pain Intervention(s) Repositioned;Ambulation/increased activity  -AY     Additional Documentation Pain Scale: Numbers Pre/Post-Treatment (Group)  -AY       Row Name 10/17/23 1121          Plan of Care Review    Plan of Care Reviewed With patient  -AY     Progress improving  -AY     Outcome Evaluation Pt showing improvement as  she increased ambulation distance 10ft + 20ft with RWx and min A, limited by incisional pain and generalized weakness compared to baseline. d/c rec for IRF.  -AY       Row Name 10/17/23 1121          Vital Signs    Pre Systolic BP Rehab 120  -AY     Pre Treatment Diastolic BP 56  -AY     Post Systolic BP Rehab 122  -AY     Post Treatment Diastolic BP 59  -AY     Pretreatment Heart Rate (beats/min) 101  -AY     Posttreatment Heart Rate (beats/min) 105  -AY     Pre SpO2 (%) 95  -AY     O2 Delivery Pre Treatment nasal cannula  -AY     O2 Delivery Intra Treatment nasal cannula  -AY     Post SpO2 (%) 96  -AY     O2 Delivery Post Treatment nasal cannula  -AY     Pre Patient Position Sitting  -AY     Intra Patient Position Standing  -AY     Post Patient Position Sitting  -AY       Row Name 10/17/23 1121          Positioning and Restraints    Pre-Treatment Position sitting in chair/recliner  -AY     Post Treatment Position chair  -AY     In Chair notified nsg;reclined;sitting;call light within reach;encouraged to call for assist;exit alarm on;legs elevated;LUE elevated;RUE elevated;waffle cushion;on mechanical lift sling  -AY               User Key  (r) = Recorded By, (t) = Taken By, (c) = Cosigned By      Initials Name Provider Type    AY Saundra Benitez, PT Physical Therapist                   Outcome Measures       Row Name 10/17/23 1122          How much help from another person do you currently need...    Turning from your back to your side while in flat bed without using bedrails? 2  -AY     Moving from lying on back to sitting on the side of a flat bed without bedrails? 2  -AY     Moving to and from a bed to a chair (including a wheelchair)? 2  -AY     Standing up from a chair using your arms (e.g., wheelchair, bedside chair)? 3  -AY     Climbing 3-5 steps with a railing? 2  -AY     To walk in hospital room? 3  -AY     AM-PAC 6 Clicks Score (PT) 14  -AY     Highest level of mobility 4 --> Transferred to chair/commode   -AY       Row Name 10/17/23 1122 10/17/23 0903       Functional Assessment    Outcome Measure Options AM-PAC 6 Clicks Basic Mobility (PT)  -AY AM-PAC 6 Clicks Daily Activity (OT)  -CS              User Key  (r) = Recorded By, (t) = Taken By, (c) = Cosigned By      Initials Name Provider Type    Laurita Villalobos, OT Occupational Therapist    Saundra Georges, PT Physical Therapist                                 Physical Therapy Education       Title: PT OT SLP Therapies (In Progress)       Topic: Physical Therapy (In Progress)       Point: Mobility training (In Progress)       Learning Progress Summary             Patient Acceptance, E,TB, NR by  at 10/17/2023 1122    Acceptance, E,TB, NR by  at 10/16/2023 1457    Acceptance, TB,E, VU,DU by  at 10/16/2023 1227    Acceptance, E, VU,NR by  at 10/12/2023 1357   Family Acceptance, E,TB, NR by  at 10/16/2023 1457    Acceptance, TB,E, VU,DU by  at 10/16/2023 1227    Acceptance, E, VU,NR by  at 10/12/2023 1357                         Point: Home exercise program (In Progress)       Learning Progress Summary             Patient Acceptance, E,TB, NR by AY at 10/17/2023 1122    Acceptance, TB,E, VU,DU by  at 10/16/2023 1227   Family Acceptance, TB,E, VU,DU by  at 10/16/2023 1227                         Point: Body mechanics (In Progress)       Learning Progress Summary             Patient Acceptance, E,TB, NR by AY at 10/17/2023 1122    Acceptance, E,TB, NR by  at 10/16/2023 1457    Acceptance, TB,E, VU,DU by  at 10/16/2023 1227    Acceptance, E, VU,NR by  at 10/12/2023 1357   Family Acceptance, E,TB, NR by  at 10/16/2023 1457    Acceptance, TB,E, VU,DU by  at 10/16/2023 1227    Acceptance, E, VU,NR by  at 10/12/2023 1357                         Point: Precautions (In Progress)       Learning Progress Summary             Patient Acceptance, E,TB, NR by  at 10/17/2023 1122    Acceptance, E,TB, NR by AY at 10/16/2023 1457    Acceptance, TB,E,  VU,DU by  at 10/16/2023 1227    Acceptance, E, VU,NR by  at 10/12/2023 1357   Family Acceptance, E,TB, NR by  at 10/16/2023 1457    Acceptance, TB,E, VU,DU by  at 10/16/2023 1227    Acceptance, E, VU,NR by  at 10/12/2023 1357                                         User Key       Initials Effective Dates Name Provider Type Discipline     06/16/21 -  Marybeth Donovan RN Registered Nurse Nurse     11/10/20 -  Saundra Benitez, PT Physical Therapist PT     09/21/23 -  Diana Reis PT Physical Therapist PT                  PT Recommendation and Plan  Planned Therapy Interventions (PT): balance training, bed mobility training, home exercise program, gait training, transfer training, postural re-education, patient/family education, strengthening, neuromuscular re-education  Plan of Care Reviewed With: patient  Progress: improving  Outcome Evaluation: Pt showing improvement as she increased ambulation distance 10ft + 20ft with RWx and min A, limited by incisional pain and generalized weakness compared to baseline. d/c rec for IRF.     Time Calculation:         PT Charges       Row Name 10/17/23 1123             Time Calculation    Start Time 0940  -AY      PT Received On 10/17/23  -AY         Timed Charges    41457 - PT Therapeutic Exercise Minutes 10  -AY      75151 - PT Therapeutic Activity Minutes 14  -AY         Total Minutes    Timed Charges Total Minutes 24  -AY       Total Minutes 24  -AY                User Key  (r) = Recorded By, (t) = Taken By, (c) = Cosigned By      Initials Name Provider Type     Saundra Benitez, PT Physical Therapist                  Therapy Charges for Today       Code Description Service Date Service Provider Modifiers Qty    31282939763 HC PT THERAPEUTIC ACT EA 15 MIN 10/16/2023 Saundra Benitez, PT GP 2    49398998964 HC PT RE-EVAL ESTABLISHED PLAN 2 10/16/2023 Saundra Benitez, PT GP 1    16741205476 HC PT THER PROC EA 15 MIN 10/17/2023 Saundra Benitez, PT GP 1    05445924141  HC PT THERAPEUTIC ACT EA 15 MIN 10/17/2023 Saundra Benitez, PT GP 1            PT G-Codes  Outcome Measure Options: AM-PAC 6 Clicks Basic Mobility (PT)  AM-PAC 6 Clicks Score (PT): 14  AM-PAC 6 Clicks Score (OT): 10  PT Discharge Summary  Anticipated Discharge Disposition (PT): inpatient rehabilitation facility    Saundra Benitez PT  10/17/2023      Electronically signed by Saundra Benitez, PT at 10/17/23 1123          Occupational Therapy Notes (last 24 hours)        Laurita Pandey, OT at 10/17/23 0803          Patient Name: Iqra Britt  : 1950    MRN: [id number]                              Today's Date: 10/17/2023       Admit Date: 10/11/2023    Visit Dx:     ICD-10-CM ICD-9-CM   1. Pelvic mass  R19.00 789.30   2. Partial intestinal obstruction, unspecified cause  K56.600 560.9   3. Nausea and vomiting, unspecified vomiting type  R11.2 787.01   4. Other ascites  R18.8 789.59   5. Hyperglycemia  R73.9 790.29   6. Renal insufficiency  N28.9 593.9   7. SBO (small bowel obstruction)  K56.609 560.9     Patient Active Problem List   Diagnosis    Ovarian mass    COPD (chronic obstructive pulmonary disease)    Chronic HFrEF (heart failure with reduced ejection fraction)    CKD (chronic kidney disease), stage III    Type 2 diabetes mellitus    SBO (small bowel obstruction)     Past Medical History:   Diagnosis Date    Arthritis     CHF (congestive heart failure)     COPD (chronic obstructive pulmonary disease)     Diabetes mellitus     Hyperlipidemia     Renal disorder      Past Surgical History:   Procedure Laterality Date    CHOLECYSTECTOMY      DILATATION AND CURETTAGE      EXPLORATORY LAPAROTOMY N/A 10/15/2023    Procedure: LAPAROTOMY EXPLORATORY, RIGHT SALPINGO OOPHORECTOMY, LYSIS OF ADHESIONS, DESCENDING COLOSOTOMY;  Surgeon: Dana Draper MD;  Location: [facility];  Service: Gynecology Oncology;  Laterality: N/A;      General Information       Row Name 10/17/23 0856          OT Time and Intention     Document Type re-evaluation  -CS     Mode of Treatment occupational therapy  -CS       Row Name 10/17/23 0856          General Information    Patient Profile Reviewed yes  -CS     Prior Level of Function --  see IE  -CS     Existing Precautions/Restrictions fall;other (see comments);oxygen therapy device and L/min  NG, abd incision w/ binder, colostomy  -CS     Barriers to Rehab medically complex  -CS       Row Name 10/17/23 0856          Cognition    Orientation Status (Cognition) oriented x 3  -CS       Row Name 10/17/23 0856          Safety Issues, Functional Mobility    Impairments Affecting Function (Mobility) balance;endurance/activity tolerance;shortness of breath;strength;pain  -CS               User Key  (r) = Recorded By, (t) = Taken By, (c) = Cosigned By      Initials Name Provider Type    CS Laurita Pandey OT Occupational Therapist                     Mobility/ADL's       Row Name 10/17/23 0857          Bed Mobility    Bed Mobility rolling right;sidelying-sit  -CS     Rolling Right [place] (Bed Mobility) minimum assist (75% patient effort);verbal cues  -CS     Sidelying-Sit [place] (Bed Mobility) moderate assist (50% patient effort);verbal cues  -CS     Assistive Device (Bed Mobility) bed rails;draw sheet  -CS     Comment, (Bed Mobility) cueing for log-roll technique  -CS       Row Name 10/17/23 0857          Transfers    Transfers bed-chair transfer;sit-stand transfer;stand-sit transfer  -CS     Comment, (Transfers) BUE support w/ noted mild B knee buckling and posterior lean  -CS       Row Name 10/17/23 0857          Bed-Chair Transfer    Bed-Chair [place] (Transfers) moderate assist (50% patient effort);verbal cues  -CS       Row Name 10/17/23 0857          Sit-Stand Transfer    Sit-Stand [place] (Transfers) moderate assist (50% patient effort);verbal cues  -CS       Row Name 10/17/23 0857          Stand-Sit Transfer    Stand-Sit [place] (Transfers) moderate assist (50%  patient effort);verbal cues  -       Row Name 10/17/23 0857          Activities of Daily Living    BADL Assessment/Intervention lower body dressing;grooming  -       Row Name 10/17/23 0857          Lower Body Dressing Assessment/Training    [place] Level (Lower Body Dressing) don;socks;dependent (less than 25% patient effort)  -     Position (Lower Body Dressing) supine  -       Row Name 10/17/23 0857          Grooming Assessment/Training    [place] Level (Grooming) hair care, combing/brushing;maximum assist (25% patient effort)  -CS     Position (Grooming) supported sitting  -               User Key  (r) = Recorded By, (t) = Taken By, (c) = Cosigned By      Initials Name Provider Type     Laurita Pandey OT Occupational Therapist                   Obj/Interventions       Row Name 10/17/23 0858          Sensory Assessment (Somatosensory)    Sensory Assessment (Somatosensory) UE sensation intact  -       Row Name 10/17/23 0858          Range of Motion Comprehensive    General Range of Motion bilateral upper extremity ROM WFL  -       Row Name 10/17/23 0858          Strength Comprehensive (MMT)    Comment, General Manual Muscle Testing (MMT) Assessment BUE grossly 3/5  -       Row Name 10/17/23 0858          Balance    Balance Assessment sitting static balance;sitting dynamic balance;standing static balance;standing dynamic balance  -     Static Sitting Balance contact guard  -CS     Dynamic Sitting Balance minimal assist  -CS     Position, Sitting Balance sitting edge of bed  -     Static Standing Balance moderate assist  -CS     Dynamic Standing Balance moderate assist  -CS     Position/Device Used, Standing Balance supported  -CS     Balance Interventions sitting;standing;static;dynamic;dynamic reaching;occupation based/functional task;weight shifting activity  -CS               User Key  (r) = Recorded By, (t) = Taken By, (c) = Cosigned By      Initials Name Provider Type    CS  Laurita Pandey, OT Occupational Therapist                   Goals/Plan       Row Name 10/17/23 0902          Transfer Goal 1 (OT)    Activity/Assistive Device (Transfer Goal 1, OT) sit-to-stand/stand-to-sit;toilet  -CS     [place] Level/Cues Needed (Transfer Goal 1, OT) minimum assist (75% or more patient effort)  -CS     Time Frame (Transfer Goal 1, OT) long term goal (LTG);10 days  -CS     Progress/Outcome (Transfer Goal 1, OT) goal revised this date  -CS       Row Name 10/17/23 0902          Dressing Goal 1 (OT)    Activity/Device (Dressing Goal 1, OT) lower body dressing  -CS     [place]/Cues Needed (Dressing Goal 1, OT) moderate assist (50-74% patient effort)  -CS     Time Frame (Dressing Goal 1, OT) long term goal (LTG);10 days  -CS     Strategies/Barriers (Dressing Goal 1, OT) don/doff socks w/ AAD  -CS     Progress/Outcome (Dressing Goal 1, OT) goal ongoing  -CS       Row Name 10/17/23 0902          Toileting Goal 1 (OT)    Activity/Device (Toileting Goal 1, OT) adjust/manage clothing;perform perineal hygiene  -CS     [place] Level/Cues Needed (Toileting Goal 1, OT) moderate assist (50-74% patient effort)  -CS     Time Frame (Toileting Goal 1, OT) long term goal (LTG);10 days  -CS     Progress/Outcome (Toileting Goal 1, OT) goal ongoing  -CS       Row Name 10/17/23 0902          Problem Specific Goal 1 (OT)    Progress/Outcome (Problem Specific Goal 1, OT) goal no longer appropriate  -CS       Row Name 10/17/23 0902          Therapy Assessment/Plan (OT)    Planned Therapy Interventions (OT) activity tolerance training;adaptive equipment training;BADL retraining;functional balance retraining;occupation/activity based interventions;ROM/therapeutic exercise;strengthening exercise;transfer/mobility retraining  -CS               User Key  (r) = Recorded By, (t) = Taken By, (c) = Cosigned By      Initials Name Provider Type    CS Laurita Pandey, LEONOR Occupational Therapist                   Clinical  Impression       Row Name 10/17/23 0900          Pain Assessment    Pretreatment Pain Rating 3/10  -CS     Posttreatment Pain Rating 2/10  -CS     Pain Location incisional  -CS     Pain Location - abdomen  -CS     Pre/Posttreatment Pain Comment tolerated  -CS     Pain Intervention(s) Repositioned;Ambulation/increased activity  -CS       Row Name 10/17/23 0900          Plan of Care Review    Plan of Care Reviewed With patient  -CS     Progress improving  -CS     Outcome Evaluation OT Re-eval complete. Pt presents w/ generalized weakness/fatigue, abd discomfort, and poor functional endurance limiting functional independence from baseline. Recommend cont skilled IPOT POC to promote return to PLOF. Recommend pt DC to IP rehab.  -CS       Row Name 10/17/23 0900          Therapy Assessment/Plan (OT)    Patient/Family Therapy Goal Statement (OT) Return to PLOF  -CS     Rehab Potential (OT) good, to achieve stated therapy goals  -CS     Criteria for Skilled Therapeutic Interventions Met (OT) yes;meets criteria;skilled treatment is necessary  -CS     Therapy Frequency (OT) daily  -CS       Row Name 10/17/23 0900          Therapy Plan Review/Discharge Plan (OT)    Anticipated Discharge Disposition (OT) inpatient rehabilitation facility  -       Row Name 10/17/23 0900          Vital Signs    Pre Systolic BP Rehab 111  -CS     Pre Treatment Diastolic BP 54  -CS     Post Systolic BP Rehab 114  -CS     Post Treatment Diastolic BP 51  -CS     Pretreatment Heart Rate (beats/min) 101  -CS     Posttreatment Heart Rate (beats/min) 103  -CS     Pre SpO2 (%) 100  -CS     O2 Delivery Pre Treatment nasal cannula  -CS     Post SpO2 (%) 100  -CS     O2 Delivery Post Treatment nasal cannula  -CS     Pre Patient Position Supine  -CS     Intra Patient Position Standing  -CS     Post Patient Position Sitting  -CS       Row Name 10/17/23 0900          Positioning and Restraints    Pre-Treatment Position in bed  -CS     Post Treatment Position  chair  -CS     In Chair notified nsg;reclined;call light within reach;encouraged to call for assist;exit alarm on;RUE elevated;LUE elevated;waffle cushion;legs elevated;heels elevated;with nsg  -CS               User Key  (r) = Recorded By, (t) = Taken By, (c) = Cosigned By      Initials Name Provider Type    Laurita Villalobos OT Occupational Therapist                   Outcome Measures       Row Name 10/17/23 0903          How much help from another is currently needed...    Putting on and taking off regular lower body clothing? 1  -CS     Bathing (including washing, rinsing, and drying) 2  -CS     Toileting (which includes using toilet bed pan or urinal) 1  -CS     Putting on and taking off regular upper body clothing 2  -CS     Taking care of personal grooming (such as brushing teeth) 2  -CS     Eating meals 2  -CS     AM-PAC 6 Clicks Score (OT) 10  -CS       Row Name 10/17/23 0903          Functional Assessment    Outcome Measure Options AM-PAC 6 Clicks Daily Activity (OT)  -CS               User Key  (r) = Recorded By, (t) = Taken By, (c) = Cosigned By      Initials Name Provider Type    Laurita Villalobos OT Occupational Therapist                    Occupational Therapy Education       Title: PT OT SLP Therapies (In Progress)       Topic: Occupational Therapy (In Progress)       Point: ADL training (In Progress)       Description:   Instruct learner(s) on proper safety adaptation and remediation techniques during self care or transfers.   Instruct in proper use of assistive devices.                  Learning Progress Summary             Patient Acceptance, E, NR by  at 10/17/2023 0903    Acceptance, TB,E, VU,DU by  at 10/16/2023 1227    Acceptance, E, VU by  at 10/13/2023 0952   Family Acceptance, TB,E, VU,DU by  at 10/16/2023 1227    Acceptance, E, VU by  at 10/13/2023 0952                         Point: Home exercise program (Done)       Description:   Instruct learner(s) on appropriate technique  for monitoring, assisting and/or progressing therapeutic exercises/activities.                  Learning Progress Summary             Patient Acceptance, TB,E, VU,DU by  at 10/16/2023 1227   Family Acceptance, TB,E, VU,DU by  at 10/16/2023 1227                         Point: Precautions (In Progress)       Description:   Instruct learner(s) on prescribed precautions during self-care and functional transfers.                  Learning Progress Summary             Patient Acceptance, E, NR by  at 10/17/2023 0903    Acceptance, TB,E, VU,DU by  at 10/16/2023 1227    Acceptance, E, VU by  at 10/13/2023 0952   Family Acceptance, TB,E, VU,DU by  at 10/16/2023 1227    Acceptance, E, VU by  at 10/13/2023 0952                         Point: Body mechanics (In Progress)       Description:   Instruct learner(s) on proper positioning and spine alignment during self-care, functional mobility activities and/or exercises.                  Learning Progress Summary             Patient Acceptance, E, NR by  at 10/17/2023 0903    Acceptance, TB,E, VU,DU by  at 10/16/2023 1227   Family Acceptance, TB,E, VU,DU by  at 10/16/2023 1227                                         User Key       Initials Effective Dates Name Provider Type Discipline     06/16/21 -  Marybeth Donovan RN Registered Nurse Nurse     09/02/21 -  Laurita Padney OT Occupational Therapist OT     06/16/21 -  Alisa Weir OT Occupational Therapist OT                  OT Recommendation and Plan  Planned Therapy Interventions (OT): activity tolerance training, adaptive equipment training, BADL retraining, functional balance retraining, occupation/activity based interventions, ROM/therapeutic exercise, strengthening exercise, transfer/mobility retraining  Therapy Frequency (OT): daily  Plan of Care Review  Plan of Care Reviewed With: patient  Progress: improving  Outcome Evaluation: OT Re-eval complete. Pt presents w/ generalized  weakness/fatigue, abd discomfort, and poor functional endurance limiting functional independence from baseline. Recommend cont skilled IPOT POC to promote return to PLOF. Recommend pt DC to IP rehab.     Time Calculation:         Time Calculation- OT       Row Name 10/17/23 0904             Time Calculation- OT    OT Start Time 0803  -CS      OT Received On 10/17/23  -CS      OT Goal Re-Cert Due Date 10/27/23  -CS         Timed Charges    11493 - OT Therapeutic Activity Minutes 18  -CS      89232 - OT Self Care/Mgmt Minutes 6  -CS         Untimed Charges    OT Eval/Re-eval Minutes 20  -CS         Total Minutes    Timed Charges Total Minutes 24  -CS      Untimed Charges Total Minutes 20  -CS       Total Minutes 44  -CS                User Key  (r) = Recorded By, (t) = Taken By, (c) = Cosigned By      Initials Name Provider Type    CS Laurita Pandey OT Occupational Therapist                  Therapy Charges for Today       Code Description Service Date Service Provider Modifiers Qty    08378771661 HC OT THERAPEUTIC ACT EA 15 MIN 10/17/2023 Laurita Pandey OT GO 1    19693897898 HC OT SELF CARE/MGMT/TRAIN EA 15 MIN 10/17/2023 Laurita Pandey OT GO 1    47233786596 HC OT RE-EVAL 2 10/17/2023 Laurita Pandey OT GO 1                 Laurita Pandey OT  10/17/2023    Electronically signed by Laurita Pandey OT at 10/17/23 0905       Laurita Pandey OT at 10/17/23 0803          Goal Outcome Evaluation:  Plan of Care Reviewed With: patient        Progress: improving  Outcome Evaluation: OT Re-eval complete. Pt presents w/ generalized weakness/fatigue, abd discomfort, and poor functional endurance limiting functional independence from baseline. Recommend cont skilled IPOT POC to promote return to PLOF. Recommend pt DC to IP rehab.      Anticipated Discharge Disposition (OT): inpatient rehabilitation facility    Electronically signed by Laurita Pandey OT at 10/17/23 0903        Bilobed Transposition Flap Text: The defect edges were debeveled with a #15 scalpel blade.  Given the location of the defect and the proximity to free margins a bilobed transposition flap was deemed most appropriate.  Using a sterile surgical marker, an appropriate bilobe flap drawn around the defect.    The area thus outlined was incised deep to adipose tissue with a #15 scalpel blade.  The skin margins were undermined to an appropriate distance in all directions utilizing iris scissors.

## 2023-10-17 NOTE — PROGRESS NOTES
Clinical Nutrition   PN Support      Patient Name: Iqra Britt  Date of Encounter: 10/17/23 12:23 EDT  MRN: [id number]  Admission date: 10/11/2023    Pt resting in bed, reports tolerating some clear liquids, has nausea if she sits up too long    Per RN: ngt clamped  since 7am, not much colostomy  output    Current diet: Adult Central 2-in-1 TPN  Diet: Liquid Diets; Clear Liquid; Fluid Consistency: Thin (IDDSI 0)    D15% AA5% @60ml/hr, 200ml SMOF  lipids daily, (20%)    Intervention:  Follow treatment plan  Care plan reviewed    Maintain current PN    Add Boost Breeze 3x/day    Follow up:   Per protocol    Luz Cardoza RD  12:23 EDT  Time: 15min

## 2023-10-17 NOTE — NURSING NOTE
[facility] follow up for Stoma care and assessment     Surgery date: 10/10/2023        Surgeon: Bonny     [facility] Care Outcome: Patient seen for ostomy care, assessment and education. Patient awake, up in chair. Patient seen in the Intensive Care Unit (ICU) .  Son not present at beside.     Stoma Type: Descending Colostomy  Diameter/shape: Round, measurements not taken  Location: Left upper quadrant  Protrusion: Budded  Stoma Characteristics: Round, Edematous, Moist, and Pink  Peristomal skin: RAMON =unable to assess  Effluent Characteristics: Brown, Light, bilious  Effluent volume emptied:      Current pouching system: "[employer] 8331, flat, drainable  Accessory products used:   Goal wear time: 3-4 days  Emptying frequency of appliance per day: Recommend emptying when 1/3-1/2 full.   Last appliance change: 10/10/2023     Follow up visit summary:  -Stoma viable, awaiting stool output.  -Ostomy education folder provided and reviewed contents.  -Discussed importance of regular hydration, ambulation and fiber in patient's diet once she is recovered from surgery   -Ambulation promoted.  Patient to work with PT later today.  -Discussed bathing and showering.  -Provided sample ostomy appliance for patient to practice with today  -We will need to meet with her son tomorrow so that he is knowledgeable about ostomy care.  -Plan to change appliance tomorrow and provide first ostomy change lesson    [facility] Nurse will continue to follow daily for ostomy education. Please contact with questions or if new needs arise.      Homero Maloney RN, BSN, CCRN, CWOCN  Wound, Ostomy and Continence (WOC) Department  [facility]

## 2023-10-17 NOTE — PROGRESS NOTES
"Enter Query Response Below      Query Response: Heart failure due to hypertension and nonischemic cardiomyopathy             If applicable, please update the problem list.     Patient: Iqra Lazcano        : 1950  Account: [id number]           Admit Date: 10/11/2023        How to Respond to this query:       a. Click New Note     b. Answer query within the yellow box.                c. Update the Problem List, if applicable.      If you have any questions about this query contact me at: [email or web address]     Dr. Muhammad:    Patient with history of nonischemic cardiomyopathy, hypertension, CKD post placement of biventricular device admitted with small bowel obstruction and ovarian mass.  Home medications per NG tube during the admission and progress note 10/14 indicates \"CHF appears to be compensated at this time.\" Chronic HFrEF is documented in the record.    Please clarify the etiology of the patient’s heart failure:    Heart failure due to hypertension  Heart failure due to nonischemic cardiomyopathy  Heart failure due to hypertension and nonischemic cardiomyopathy   Other- specify__________  Unable to determine      By submitting this query, we are merely seeking further clarification of documentation to accurately reflect all conditions that you are monitoring, evaluating, treating or that extend the hospitalization or utilize additional resources of care. Please utilize your independent clinical judgment when addressing the question(s) above.     This query and your response, once completed, will be entered into the legal medical record.    Sincerely,  Odette Morton RN, CCDS  Clinical Documentation Integrity Program     "

## 2023-10-17 NOTE — THERAPY TREATMENT NOTE
Patient Name: Iqra Britt  : 1950    MRN: [id number]                              Today's Date: 10/17/2023       Admit Date: 10/11/2023    Visit Dx:     ICD-10-CM ICD-9-CM   1. Pelvic mass  R19.00 789.30   2. Partial intestinal obstruction, unspecified cause  K56.600 560.9   3. Nausea and vomiting, unspecified vomiting type  R11.2 787.01   4. Other ascites  R18.8 789.59   5. Hyperglycemia  R73.9 790.29   6. Renal insufficiency  N28.9 593.9   7. SBO (small bowel obstruction)  K56.609 560.9     Patient Active Problem List   Diagnosis    Ovarian mass    COPD (chronic obstructive pulmonary disease)    Chronic HFrEF (heart failure with reduced ejection fraction)    CKD (chronic kidney disease), stage III    Type 2 diabetes mellitus    SBO (small bowel obstruction)     Past Medical History:   Diagnosis Date    Arthritis     CHF (congestive heart failure)     COPD (chronic obstructive pulmonary disease)     Diabetes mellitus     Hyperlipidemia     Renal disorder      Past Surgical History:   Procedure Laterality Date    CHOLECYSTECTOMY      DILATATION AND CURETTAGE      EXPLORATORY LAPAROTOMY N/A 10/15/2023    Procedure: LAPAROTOMY EXPLORATORY, RIGHT SALPINGO OOPHORECTOMY, LYSIS OF ADHESIONS, DESCENDING COLOSOTOMY;  Surgeon: Dana Draper MD;  Location: [facility];  Service: Gynecology Oncology;  Laterality: N/A;      General Information       Row Name 10/17/23 1118          Physical Therapy Time and Intention    Document Type therapy note (daily note)  -AY     Mode of Treatment physical therapy  -AY       Row Name 10/17/23 1118          General Information    Patient Profile Reviewed yes  -AY     Existing Precautions/Restrictions fall;other (see comments);oxygen therapy device and L/min  NG, abd incision w/ binder, colostomy  -AY     Barriers to Rehab medically complex  -AY       Row Name 10/17/23 1118          Cognition    Orientation Status (Cognition) oriented x 3  -AY       Row Name 10/17/23  1118          Safety Issues, Functional Mobility    Safety Issues Affecting Function (Mobility) insight into deficits/self-awareness;sequencing abilities  -AY     Impairments Affecting Function (Mobility) balance;endurance/activity tolerance;shortness of breath;strength;pain  -AY               User Key  (r) = Recorded By, (t) = Taken By, (c) = Cosigned By      Initials Name Provider Type    Saundra Georges PT Physical Therapist                   Mobility       Row Name 10/17/23 1119          Sit-Stand Transfer    Sit-Stand Maury (Transfers) 1 person assist;minimum assist (75% patient effort);verbal cues  -AY     Assistive Device (Sit-Stand Transfers) walker, front-wheeled  -AY     Comment, (Sit-Stand Transfer) performed x3 reps.  -AY       Row Name 10/17/23 1119          Gait/Stairs (Locomotion)    Maury Level (Gait) minimum assist (75% patient effort);1 person assist;verbal cues;nonverbal cues (demo/gesture)  +chair follow  -AY     Assistive Device (Gait) walker, front-wheeled  -AY     Distance in Feet (Gait) 10+20  -AY     Deviations/Abnormal Patterns (Gait) bilateral deviations;base of support, narrow;bhavana decreased;gait speed decreased;stride length decreased  -AY     Bilateral Gait Deviations heel strike decreased;forward flexed posture  -AY     Comment, (Gait/Stairs) decreased stride length noted with shuffling gait pattern. one seated rest break required. close chair follow required d/t decreased endurance and increased incisional pain.  -AY               User Key  (r) = Recorded By, (t) = Taken By, (c) = Cosigned By      Initials Name Provider Type    Saundra Georges PT Physical Therapist                   Obj/Interventions       Row Name 10/17/23 1120          Motor Skills    Therapeutic Exercise knee;ankle;hip  -AY       Row Name 10/17/23 1120          Hip (Therapeutic Exercise)    Hip (Therapeutic Exercise) strengthening exercise  -AY     Hip Strengthening (Therapeutic Exercise)  bilateral;10 repetitions;sitting;marching while seated  -AY       Row Name 10/17/23 1120          Knee (Therapeutic Exercise)    Knee (Therapeutic Exercise) strengthening exercise  -AY     Knee Strengthening (Therapeutic Exercise) bilateral;LAQ (long arc quad);10 repetitions;sitting  -AY       Row Name 10/17/23 1120          Ankle (Therapeutic Exercise)    Ankle (Therapeutic Exercise) AROM (active range of motion)  -AY     Ankle AROM (Therapeutic Exercise) bilateral;dorsiflexion;plantarflexion;10 repetitions;sitting  -AY       Row Name 10/17/23 1120          Balance    Balance Assessment sitting static balance;sitting dynamic balance;sit to stand dynamic balance;standing static balance;standing dynamic balance  -AY     Static Sitting Balance contact guard  -AY     Dynamic Sitting Balance contact guard  -AY     Position, Sitting Balance unsupported;sitting in chair  -AY     Sit to Stand Dynamic Balance minimal assist  -AY     Static Standing Balance minimal assist  -AY     Dynamic Standing Balance minimal assist  -AY     Position/Device Used, Standing Balance supported;walker, rolling  -AY               User Key  (r) = Recorded By, (t) = Taken By, (c) = Cosigned By      Initials Name Provider Type    AY Saundra Benitez, PT Physical Therapist                   Goals/Plan    No documentation.                  Clinical Impression       Row Name 10/17/23 1121          Pain    Pretreatment Pain Rating 3/10  -AY     Posttreatment Pain Rating 5/10  -AY     Pain Location incisional  -AY     Pain Location - abdomen  -AY     Pain Intervention(s) Repositioned;Ambulation/increased activity  -AY     Additional Documentation Pain Scale: Numbers Pre/Post-Treatment (Group)  -AY       Row Name 10/17/23 1121          Plan of Care Review    Plan of Care Reviewed With patient  -AY     Progress improving  -AY     Outcome Evaluation Pt showing improvement as she increased ambulation distance 10ft + 20ft with RWx and min A, limited by  incisional pain and generalized weakness compared to baseline. d/c rec for IRF.  -AY       Row Name 10/17/23 1121          Vital Signs    Pre Systolic BP Rehab 120  -AY     Pre Treatment Diastolic BP 56  -AY     Post Systolic BP Rehab 122  -AY     Post Treatment Diastolic BP 59  -AY     Pretreatment Heart Rate (beats/min) 101  -AY     Posttreatment Heart Rate (beats/min) 105  -AY     Pre SpO2 (%) 95  -AY     O2 Delivery Pre Treatment nasal cannula  -AY     O2 Delivery Intra Treatment nasal cannula  -AY     Post SpO2 (%) 96  -AY     O2 Delivery Post Treatment nasal cannula  -AY     Pre Patient Position Sitting  -AY     Intra Patient Position Standing  -AY     Post Patient Position Sitting  -AY       Row Name 10/17/23 1121          Positioning and Restraints    Pre-Treatment Position sitting in chair/recliner  -AY     Post Treatment Position chair  -AY     In Chair notified nsg;reclined;sitting;call light within reach;encouraged to call for assist;exit alarm on;legs elevated;LUE elevated;RUE elevated;waffle cushion;on mechanical lift sling  -AY               User Key  (r) = Recorded By, (t) = Taken By, (c) = Cosigned By      Initials Name Provider Type    AY Saundra Benitez, PT Physical Therapist                   Outcome Measures       Row Name 10/17/23 1122          How much help from another person do you currently need...    Turning from your back to your side while in flat bed without using bedrails? 2  -AY     Moving from lying on back to sitting on the side of a flat bed without bedrails? 2  -AY     Moving to and from a bed to a chair (including a wheelchair)? 2  -AY     Standing up from a chair using your arms (e.g., wheelchair, bedside chair)? 3  -AY     Climbing 3-5 steps with a railing? 2  -AY     To walk in hospital room? 3  -AY     AM-PAC 6 Clicks Score (PT) 14  -AY     Highest level of mobility 4 --> Transferred to chair/commode  -AY       Row Name 10/17/23 1122 10/17/23 0903       Functional Assessment     Outcome Measure Options AM-PAC 6 Clicks Basic Mobility (PT)  -AY AM-PAC 6 Clicks Daily Activity (OT)  -CS              User Key  (r) = Recorded By, (t) = Taken By, (c) = Cosigned By      Initials Name Provider Type    Laurita Villalobos OT Occupational Therapist    Saundra Georges, PT Physical Therapist                                 Physical Therapy Education       Title: PT OT SLP Therapies (In Progress)       Topic: Physical Therapy (In Progress)       Point: Mobility training (In Progress)       Learning Progress Summary             Patient Acceptance, E,TB, NR by AY at 10/17/2023 1122    Acceptance, E,TB, NR by  at 10/16/2023 1457    Acceptance, TB,E, VU,DU by  at 10/16/2023 1227    Acceptance, E, VU,NR by  at 10/12/2023 1357   Family Acceptance, E,TB, NR by  at 10/16/2023 1457    Acceptance, TB,E, VU,DU by  at 10/16/2023 1227    Acceptance, E, VU,NR by  at 10/12/2023 1357                         Point: Home exercise program (In Progress)       Learning Progress Summary             Patient Acceptance, E,TB, NR by  at 10/17/2023 1122    Acceptance, TB,E, VU,DU by  at 10/16/2023 1227   Family Acceptance, TB,E, VU,DU by  at 10/16/2023 1227                         Point: Body mechanics (In Progress)       Learning Progress Summary             Patient Acceptance, E,TB, NR by AY at 10/17/2023 1122    Acceptance, E,TB, NR by  at 10/16/2023 1457    Acceptance, TB,E, VU,DU by  at 10/16/2023 1227    Acceptance, E, VU,NR by  at 10/12/2023 1357   Family Acceptance, E,TB, NR by  at 10/16/2023 1457    Acceptance, TB,E, VU,DU by  at 10/16/2023 1227    Acceptance, E, VU,NR by  at 10/12/2023 1357                         Point: Precautions (In Progress)       Learning Progress Summary             Patient Acceptance, E,TB, NR by  at 10/17/2023 1122    Acceptance, E,TB, NR by  at 10/16/2023 1457    Acceptance, TB,E, VU,DU by  at 10/16/2023 1227    Acceptance, E, VU,NR by  at 10/12/2023 1351    Family Acceptance, E,TB, NR by  at 10/16/2023 1457    Acceptance, TB,E, VU,DU by  at 10/16/2023 1227    Acceptance, E, VU,NR by  at 10/12/2023 1357                                         User Key       Initials Effective Dates Name Provider Type Discipline     06/16/21 -  Marybeth Donovan RN Registered Nurse Nurse     11/10/20 -  Saundra Benitez, PT Physical Therapist PT     09/21/23 -  Diana Reis PT Physical Therapist PT                  PT Recommendation and Plan  Planned Therapy Interventions (PT): balance training, bed mobility training, home exercise program, gait training, transfer training, postural re-education, patient/family education, strengthening, neuromuscular re-education  Plan of Care Reviewed With: patient  Progress: improving  Outcome Evaluation: Pt showing improvement as she increased ambulation distance 10ft + 20ft with RWx and min A, limited by incisional pain and generalized weakness compared to baseline. d/c rec for IRF.     Time Calculation:         PT Charges       Row Name 10/17/23 1123             Time Calculation    Start Time 0940  -AY      PT Received On 10/17/23  -AY         Timed Charges    12651 - PT Therapeutic Exercise Minutes 10  -AY      29430 - PT Therapeutic Activity Minutes 14  -AY         Total Minutes    Timed Charges Total Minutes 24  -AY       Total Minutes 24  -AY                User Key  (r) = Recorded By, (t) = Taken By, (c) = Cosigned By      Initials Name Provider Type     Saundra Benitez, KRISH Physical Therapist                  Therapy Charges for Today       Code Description Service Date Service Provider Modifiers Qty    46716939999 HC PT THERAPEUTIC ACT EA 15 MIN 10/16/2023 Saundra Benitez, PT GP 2    92921405218 HC PT RE-EVAL ESTABLISHED PLAN 2 10/16/2023 Saundra Benitez, PT GP 1    40094733990 HC PT THER PROC EA 15 MIN 10/17/2023 Saundra Benitez, PT GP 1    72358799486 HC PT THERAPEUTIC ACT EA 15 MIN 10/17/2023 Saundra Benitez, PT GP 1            PT  G-Codes  Outcome Measure Options: AM-PAC 6 Clicks Basic Mobility (PT)  AM-PAC 6 Clicks Score (PT): 14  AM-PAC 6 Clicks Score (OT): 10  PT Discharge Summary  Anticipated Discharge Disposition (PT): inpatient rehabilitation facility    Saundra Benitez PT  10/17/2023

## 2023-10-17 NOTE — THERAPY RE-EVALUATION
Patient Name: Iqra Britt  : 1950    MRN: [id number]                              Today's Date: 10/17/2023       Admit Date: 10/11/2023    Visit Dx:     ICD-10-CM ICD-9-CM   1. Pelvic mass  R19.00 789.30   2. Partial intestinal obstruction, unspecified cause  K56.600 560.9   3. Nausea and vomiting, unspecified vomiting type  R11.2 787.01   4. Other ascites  R18.8 789.59   5. Hyperglycemia  R73.9 790.29   6. Renal insufficiency  N28.9 593.9   7. SBO (small bowel obstruction)  K56.609 560.9     Patient Active Problem List   Diagnosis    Ovarian mass    COPD (chronic obstructive pulmonary disease)    Chronic HFrEF (heart failure with reduced ejection fraction)    CKD (chronic kidney disease), stage III    Type 2 diabetes mellitus    SBO (small bowel obstruction)     Past Medical History:   Diagnosis Date    Arthritis     CHF (congestive heart failure)     COPD (chronic obstructive pulmonary disease)     Diabetes mellitus     Hyperlipidemia     Renal disorder      Past Surgical History:   Procedure Laterality Date    CHOLECYSTECTOMY      DILATATION AND CURETTAGE      EXPLORATORY LAPAROTOMY N/A 10/15/2023    Procedure: LAPAROTOMY EXPLORATORY, RIGHT SALPINGO OOPHORECTOMY, LYSIS OF ADHESIONS, DESCENDING COLOSOTOMY;  Surgeon: Dana Draper MD;  Location: [facility];  Service: Gynecology Oncology;  Laterality: N/A;      General Information       Row Name 10/17/23 0856          OT Time and Intention    Document Type re-evaluation  -CS     Mode of Treatment occupational therapy  -CS       Row Name 10/17/23 0856          General Information    Patient Profile Reviewed yes  -CS     Prior Level of Function --  see IE  -CS     Existing Precautions/Restrictions fall;other (see comments);oxygen therapy device and L/min  NG, abd incision w/ binder, colostomy  -CS     Barriers to Rehab medically complex  -CS       Row Name 10/17/23 0856          Cognition    Orientation Status (Cognition) oriented x 3  -CS        Row Name 10/17/23 0856          Safety Issues, Functional Mobility    Impairments Affecting Function (Mobility) balance;endurance/activity tolerance;shortness of breath;strength;pain  -CS               User Key  (r) = Recorded By, (t) = Taken By, (c) = Cosigned By      Initials Name Provider Type    CS Laurita Pandey OT Occupational Therapist                     Mobility/ADL's       Row Name 10/17/23 0857          Bed Mobility    Bed Mobility rolling right;sidelying-sit  -CS     Rolling Right [place] (Bed Mobility) minimum assist (75% patient effort);verbal cues  -CS     Sidelying-Sit [place] (Bed Mobility) moderate assist (50% patient effort);verbal cues  -CS     Assistive Device (Bed Mobility) bed rails;draw sheet  -CS     Comment, (Bed Mobility) cueing for log-roll technique  -CS       Row Name 10/17/23 0857          Transfers    Transfers bed-chair transfer;sit-stand transfer;stand-sit transfer  -CS     Comment, (Transfers) BUE support w/ noted mild B knee buckling and posterior lean  -CS       Row Name 10/17/23 0857          Bed-Chair Transfer    Bed-Chair [place] (Transfers) moderate assist (50% patient effort);verbal cues  -CS       Row Name 10/17/23 0857          Sit-Stand Transfer    Sit-Stand [place] (Transfers) moderate assist (50% patient effort);verbal cues  -CS       Row Name 10/17/23 0857          Stand-Sit Transfer    Stand-Sit [place] (Transfers) moderate assist (50% patient effort);verbal cues  -CS       Row Name 10/17/23 0857          Activities of Daily Living    BADL Assessment/Intervention lower body dressing;grooming  -CS       Row Name 10/17/23 0857          Lower Body Dressing Assessment/Training    [place] Level (Lower Body Dressing) don;socks;dependent (less than 25% patient effort)  -CS     Position (Lower Body Dressing) supine  -CS       Row Name 10/17/23 0857          Grooming Assessment/Training    [place] Level (Grooming) hair care,  combing/brushing;maximum assist (25% patient effort)  -CS     Position (Grooming) supported sitting  -CS               User Key  (r) = Recorded By, (t) = Taken By, (c) = Cosigned By      Initials Name Provider Type    CS Laurita Pandey OT Occupational Therapist                   Obj/Interventions       Row Name 10/17/23 0858          Sensory Assessment (Somatosensory)    Sensory Assessment (Somatosensory) UE sensation intact  -       Row Name 10/17/23 0858          Range of Motion Comprehensive    General Range of Motion bilateral upper extremity ROM WFL  -       Row Name 10/17/23 0858          Strength Comprehensive (MMT)    Comment, General Manual Muscle Testing (MMT) Assessment BUE grossly 3/5  -CS       Row Name 10/17/23 0858          Balance    Balance Assessment sitting static balance;sitting dynamic balance;standing static balance;standing dynamic balance  -CS     Static Sitting Balance contact guard  -CS     Dynamic Sitting Balance minimal assist  -CS     Position, Sitting Balance sitting edge of bed  -CS     Static Standing Balance moderate assist  -CS     Dynamic Standing Balance moderate assist  -CS     Position/Device Used, Standing Balance supported  -CS     Balance Interventions sitting;standing;static;dynamic;dynamic reaching;occupation based/functional task;weight shifting activity  -CS               User Key  (r) = Recorded By, (t) = Taken By, (c) = Cosigned By      Initials Name Provider Type    CS Laurita Pandey OT Occupational Therapist                   Goals/Plan       Row Name 10/17/23 0902          Transfer Goal 1 (OT)    Activity/Assistive Device (Transfer Goal 1, OT) sit-to-stand/stand-to-sit;toilet  -     [place] Level/Cues Needed (Transfer Goal 1, OT) minimum assist (75% or more patient effort)  -     Time Frame (Transfer Goal 1, OT) long term goal (LTG);10 days  -     Progress/Outcome (Transfer Goal 1, OT) goal revised this date  -       Row Name 10/17/23 0902           Dressing Goal 1 (OT)    Activity/Device (Dressing Goal 1, OT) lower body dressing  -CS     [place]/Cues Needed (Dressing Goal 1, OT) moderate assist (50-74% patient effort)  -CS     Time Frame (Dressing Goal 1, OT) long term goal (LTG);10 days  -CS     Strategies/Barriers (Dressing Goal 1, OT) don/doff socks w/ AAD  -CS     Progress/Outcome (Dressing Goal 1, OT) goal ongoing  -CS       Row Name 10/17/23 0902          Toileting Goal 1 (OT)    Activity/Device (Toileting Goal 1, OT) adjust/manage clothing;perform perineal hygiene  -CS     [place] Level/Cues Needed (Toileting Goal 1, OT) moderate assist (50-74% patient effort)  -CS     Time Frame (Toileting Goal 1, OT) long term goal (LTG);10 days  -CS     Progress/Outcome (Toileting Goal 1, OT) goal ongoing  -CS       Row Name 10/17/23 0902          Problem Specific Goal 1 (OT)    Progress/Outcome (Problem Specific Goal 1, OT) goal no longer appropriate  -CS       Row Name 10/17/23 0902          Therapy Assessment/Plan (OT)    Planned Therapy Interventions (OT) activity tolerance training;adaptive equipment training;BADL retraining;functional balance retraining;occupation/activity based interventions;ROM/therapeutic exercise;strengthening exercise;transfer/mobility retraining  -CS               User Key  (r) = Recorded By, (t) = Taken By, (c) = Cosigned By      Initials Name Provider Type    CS Laurita Pandey, OT Occupational Therapist                   Clinical Impression       Row Name 10/17/23 0900          Pain Assessment    Pretreatment Pain Rating 3/10  -CS     Posttreatment Pain Rating 2/10  -CS     Pain Location incisional  -CS     Pain Location - abdomen  -CS     Pre/Posttreatment Pain Comment tolerated  -CS     Pain Intervention(s) Repositioned;Ambulation/increased activity  -CS       Row Name 10/17/23 0900          Plan of Care Review    Plan of Care Reviewed With patient  -CS     Progress improving  -CS     Outcome Evaluation OT Re-eval complete.  Pt presents w/ generalized weakness/fatigue, abd discomfort, and poor functional endurance limiting functional independence from baseline. Recommend cont skilled IPOT POC to promote return to PLOF. Recommend pt DC to IP rehab.  -CS       Row Name 10/17/23 0900          Therapy Assessment/Plan (OT)    Patient/Family Therapy Goal Statement (OT) Return to PLOF  -CS     Rehab Potential (OT) good, to achieve stated therapy goals  -CS     Criteria for Skilled Therapeutic Interventions Met (OT) yes;meets criteria;skilled treatment is necessary  -CS     Therapy Frequency (OT) daily  -CS       Row Name 10/17/23 0900          Therapy Plan Review/Discharge Plan (OT)    Anticipated Discharge Disposition (OT) inpatient rehabilitation facility  -CS       Row Name 10/17/23 0900          Vital Signs    Pre Systolic BP Rehab 111  -CS     Pre Treatment Diastolic BP 54  -CS     Post Systolic BP Rehab 114  -CS     Post Treatment Diastolic BP 51  -CS     Pretreatment Heart Rate (beats/min) 101  -CS     Posttreatment Heart Rate (beats/min) 103  -CS     Pre SpO2 (%) 100  -CS     O2 Delivery Pre Treatment nasal cannula  -CS     Post SpO2 (%) 100  -CS     O2 Delivery Post Treatment nasal cannula  -CS     Pre Patient Position Supine  -CS     Intra Patient Position Standing  -CS     Post Patient Position Sitting  -CS       Row Name 10/17/23 0900          Positioning and Restraints    Pre-Treatment Position in bed  -CS     Post Treatment Position chair  -CS     In Chair notified nsg;reclined;call light within reach;encouraged to call for assist;exit alarm on;RUE elevated;LUE elevated;waffle cushion;legs elevated;heels elevated;with nsg  -CS               User Key  (r) = Recorded By, (t) = Taken By, (c) = Cosigned By      Initials Name Provider Type    CS Laurita Pandey, OT Occupational Therapist                   Outcome Measures       Row Name 10/17/23 0903          How much help from another is currently needed...    Putting on and taking  off regular lower body clothing? 1  -CS     Bathing (including washing, rinsing, and drying) 2  -CS     Toileting (which includes using toilet bed pan or urinal) 1  -CS     Putting on and taking off regular upper body clothing 2  -CS     Taking care of personal grooming (such as brushing teeth) 2  -CS     Eating meals 2  -CS     AM-PAC 6 Clicks Score (OT) 10  -CS       Row Name 10/17/23 0903          Functional Assessment    Outcome Measure Options AM-PAC 6 Clicks Daily Activity (OT)  -CS               User Key  (r) = Recorded By, (t) = Taken By, (c) = Cosigned By      Initials Name Provider Type    CS Laurita Pandey OT Occupational Therapist                    Occupational Therapy Education       Title: PT OT SLP Therapies (In Progress)       Topic: Occupational Therapy (In Progress)       Point: ADL training (In Progress)       Description:   Instruct learner(s) on proper safety adaptation and remediation techniques during self care or transfers.   Instruct in proper use of assistive devices.                  Learning Progress Summary             Patient Acceptance, E, NR by  at 10/17/2023 0903    Acceptance, TB,E, VU,DU by  at 10/16/2023 1227    Acceptance, E, VU by  at 10/13/2023 0952   Family Acceptance, TB,E, VU,DU by  at 10/16/2023 1227    Acceptance, E, VU by  at 10/13/2023 0952                         Point: Home exercise program (Done)       Description:   Instruct learner(s) on appropriate technique for monitoring, assisting and/or progressing therapeutic exercises/activities.                  Learning Progress Summary             Patient Acceptance, TB,E, VU,DU by  at 10/16/2023 1227   Family Acceptance, TB,E, VU,DU by  at 10/16/2023 1227                         Point: Precautions (In Progress)       Description:   Instruct learner(s) on prescribed precautions during self-care and functional transfers.                  Learning Progress Summary             Patient Acceptance, E, NR by   at 10/17/2023 0903    Acceptance, TB,E, VU,DU by  at 10/16/2023 1227    Acceptance, E, VU by  at 10/13/2023 0952   Family Acceptance, TB,E, VU,DU by  at 10/16/2023 1227    Acceptance, E, VU by  at 10/13/2023 0952                         Point: Body mechanics (In Progress)       Description:   Instruct learner(s) on proper positioning and spine alignment during self-care, functional mobility activities and/or exercises.                  Learning Progress Summary             Patient Acceptance, E, NR by  at 10/17/2023 0903    Acceptance, TB,E, VU,DU by  at 10/16/2023 1227   Family Acceptance, TB,E, VU,DU by  at 10/16/2023 1227                                         User Key       Initials Effective Dates Name Provider Type Discipline     06/16/21 -  Marybeth Donovan RN Registered Nurse Nurse     09/02/21 -  Laurita Pandey OT Occupational Therapist OT     06/16/21 -  Alisa Weir OT Occupational Therapist OT                  OT Recommendation and Plan  Planned Therapy Interventions (OT): activity tolerance training, adaptive equipment training, BADL retraining, functional balance retraining, occupation/activity based interventions, ROM/therapeutic exercise, strengthening exercise, transfer/mobility retraining  Therapy Frequency (OT): daily  Plan of Care Review  Plan of Care Reviewed With: patient  Progress: improving  Outcome Evaluation: OT Re-eval complete. Pt presents w/ generalized weakness/fatigue, abd discomfort, and poor functional endurance limiting functional independence from baseline. Recommend cont skilled IPOT POC to promote return to PLOF. Recommend pt DC to IP rehab.     Time Calculation:         Time Calculation- OT       Row Name 10/17/23 0904             Time Calculation- OT    OT Start Time 0803  -      OT Received On 10/17/23  -      OT Goal Re-Cert Due Date 10/27/23  -         Timed Charges    95387 - OT Therapeutic Activity Minutes 18  -      09395 - OT Self  Care/Mgmt Minutes 6  -CS         Untimed Charges    OT Eval/Re-eval Minutes 20  -CS         Total Minutes    Timed Charges Total Minutes 24  -CS      Untimed Charges Total Minutes 20  -CS       Total Minutes 44  -CS                User Key  (r) = Recorded By, (t) = Taken By, (c) = Cosigned By      Initials Name Provider Type    CS Laurita Pandey OT Occupational Therapist                  Therapy Charges for Today       Code Description Service Date Service Provider Modifiers Qty    62779492212 HC OT THERAPEUTIC ACT EA 15 MIN 10/17/2023 Laurita Pandey OT GO 1    42224297293 HC OT SELF CARE/MGMT/TRAIN EA 15 MIN 10/17/2023 Laurita Pandey OT GO 1    03328801851 HC OT RE-EVAL 2 10/17/2023 Laurita Pandey OT GO 1                 Laurita Pandey OT  10/17/2023

## 2023-10-17 NOTE — PLAN OF CARE
Goal Outcome Evaluation:  Plan of Care Reviewed With: patient        Progress: improving  Outcome Evaluation: H&H remains stable. Ricardo gtt remains off. Pain controlled with PRN dilaudid x2.

## 2023-10-17 NOTE — PLAN OF CARE
Goal Outcome Evaluation:  Plan of Care Reviewed With: patient        Progress: improving  Outcome Evaluation: Pt showing improvement as she increased ambulation distance 10ft + 20ft with RWx and min A, limited by incisional pain and generalized weakness compared to baseline. d/c rec for IRF.      Anticipated Discharge Disposition (PT): inpatient rehabilitation facility

## 2023-10-17 NOTE — PLAN OF CARE
Goal Outcome Evaluation:  Plan of Care Reviewed With: patient        Progress: improving  Outcome Evaluation: OT Re-eval complete. Pt presents w/ generalized weakness/fatigue, abd discomfort, and poor functional endurance limiting functional independence from baseline. Recommend cont skilled IPOT POC to promote return to PLOF. Recommend pt DC to IP rehab.      Anticipated Discharge Disposition (OT): inpatient rehabilitation facility

## 2023-10-17 NOTE — PLAN OF CARE
Goal Outcome Evaluation:              Outcome Evaluation: VSS today. NG clamped this morning, tolerating CLD. Minimal output from ostomy today. Khan d/c'd. UOP~475 with x2 occurances. Tele orders placed.

## 2023-10-18 LAB
ANION GAP SERPL CALCULATED.3IONS-SCNC: 6 MMOL/L (ref 5–15)
BUN SERPL-MCNC: 27 MG/DL (ref 8–23)
BUN/CREAT SERPL: 32.5 (ref 7–25)
CALCIUM SPEC-SCNC: 7.5 MG/DL (ref 8.6–10.5)
CHLORIDE SERPL-SCNC: 113 MMOL/L (ref 98–107)
CO2 SERPL-SCNC: 23 MMOL/L (ref 22–29)
CREAT SERPL-MCNC: 0.83 MG/DL (ref 0.57–1)
CYTO UR: NORMAL
DEPRECATED RDW RBC AUTO: 49.8 FL (ref 37–54)
EGFRCR SERPLBLD CKD-EPI 2021: 75 ML/MIN/1.73
ERYTHROCYTE [DISTWIDTH] IN BLOOD BY AUTOMATED COUNT: 16.3 % (ref 12.3–15.4)
GLUCOSE BLDC GLUCOMTR-MCNC: 141 MG/DL (ref 70–130)
GLUCOSE BLDC GLUCOMTR-MCNC: 196 MG/DL (ref 70–130)
GLUCOSE BLDC GLUCOMTR-MCNC: 258 MG/DL (ref 70–130)
GLUCOSE BLDC GLUCOMTR-MCNC: 288 MG/DL (ref 70–130)
GLUCOSE SERPL-MCNC: 200 MG/DL (ref 65–99)
HCT VFR BLD AUTO: 28.4 % (ref 34–46.6)
HGB BLD-MCNC: 8.8 G/DL (ref 12–15.9)
LAB AP CASE REPORT: NORMAL
LAB AP CLINICAL INFORMATION: NORMAL
LAB AP DIAGNOSIS COMMENT: NORMAL
LAB AP SPECIAL STAINS: NORMAL
MAGNESIUM SERPL-MCNC: 2.7 MG/DL (ref 1.6–2.4)
MCH RBC QN AUTO: 26 PG (ref 26.6–33)
MCHC RBC AUTO-ENTMCNC: 31 G/DL (ref 31.5–35.7)
MCV RBC AUTO: 84 FL (ref 79–97)
PATH REPORT.FINAL DX SPEC: NORMAL
PATH REPORT.GROSS SPEC: NORMAL
PHOSPHATE SERPL-MCNC: 2.6 MG/DL (ref 2.5–4.5)
PLATELET # BLD AUTO: 172 10*3/MM3 (ref 140–450)
PMV BLD AUTO: 11.9 FL (ref 6–12)
POTASSIUM SERPL-SCNC: 4.4 MMOL/L (ref 3.5–5.2)
RBC # BLD AUTO: 3.38 10*6/MM3 (ref 3.77–5.28)
SODIUM SERPL-SCNC: 142 MMOL/L (ref 136–145)
WBC NRBC COR # BLD: 16.35 10*3/MM3 (ref 3.4–10.8)

## 2023-10-18 PROCEDURE — 84100 ASSAY OF PHOSPHORUS: CPT | Performed by: OBSTETRICS & GYNECOLOGY

## 2023-10-18 PROCEDURE — 25010000002 THIAMINE PER 100 MG

## 2023-10-18 PROCEDURE — 85027 COMPLETE CBC AUTOMATED: CPT | Performed by: INTERNAL MEDICINE

## 2023-10-18 PROCEDURE — 25010000002 HEPARIN (PORCINE) PER 1000 UNITS: Performed by: OBSTETRICS & GYNECOLOGY

## 2023-10-18 PROCEDURE — 25010000002 METRONIDAZOLE 500 MG/100ML SOLUTION: Performed by: NURSE PRACTITIONER

## 2023-10-18 PROCEDURE — 82948 REAGENT STRIP/BLOOD GLUCOSE: CPT

## 2023-10-18 PROCEDURE — 63710000001 INSULIN REGULAR HUMAN PER 5 UNITS: Performed by: OBSTETRICS & GYNECOLOGY

## 2023-10-18 PROCEDURE — 25010000002 CEFTRIAXONE PER 250 MG: Performed by: NURSE PRACTITIONER

## 2023-10-18 PROCEDURE — 99232 SBSQ HOSP IP/OBS MODERATE 35: CPT | Performed by: INTERNAL MEDICINE

## 2023-10-18 PROCEDURE — 80048 BASIC METABOLIC PNL TOTAL CA: CPT | Performed by: OBSTETRICS & GYNECOLOGY

## 2023-10-18 PROCEDURE — 63710000001 INSULIN REGULAR HUMAN PER 5 UNITS: Performed by: INTERNAL MEDICINE

## 2023-10-18 PROCEDURE — 63710000001 INSULIN DETEMIR PER 5 UNITS: Performed by: INTERNAL MEDICINE

## 2023-10-18 PROCEDURE — 83735 ASSAY OF MAGNESIUM: CPT | Performed by: OBSTETRICS & GYNECOLOGY

## 2023-10-18 PROCEDURE — 25010000002 METRONIDAZOLE 500 MG/100ML SOLUTION: Performed by: INTERNAL MEDICINE

## 2023-10-18 RX ORDER — DONEPEZIL HYDROCHLORIDE 5 MG/1
5 TABLET, FILM COATED ORAL NIGHTLY
Status: DISCONTINUED | OUTPATIENT
Start: 2023-10-18 | End: 2023-10-20 | Stop reason: HOSPADM

## 2023-10-18 RX ORDER — FOLIC ACID 1 MG/1
1 TABLET ORAL DAILY
Status: DISCONTINUED | OUTPATIENT
Start: 2023-10-19 | End: 2023-10-20 | Stop reason: HOSPADM

## 2023-10-18 RX ORDER — SPIRONOLACTONE 25 MG/1
25 TABLET ORAL DAILY
Status: DISCONTINUED | OUTPATIENT
Start: 2023-10-19 | End: 2023-10-20 | Stop reason: HOSPADM

## 2023-10-18 RX ORDER — CLONAZEPAM 0.5 MG/1
0.5 TABLET ORAL 2 TIMES DAILY PRN
Status: DISCONTINUED | OUTPATIENT
Start: 2023-10-18 | End: 2023-10-19

## 2023-10-18 RX ORDER — ACETAMINOPHEN 325 MG/1
650 TABLET ORAL EVERY 4 HOURS PRN
Status: DISCONTINUED | OUTPATIENT
Start: 2023-10-18 | End: 2023-10-20 | Stop reason: HOSPADM

## 2023-10-18 RX ADMIN — IVABRADINE 7.5 MG: 5 TABLET, FILM COATED ORAL at 08:19

## 2023-10-18 RX ADMIN — THIAMINE HYDROCHLORIDE 100 MG: 100 INJECTION, SOLUTION INTRAMUSCULAR; INTRAVENOUS at 08:18

## 2023-10-18 RX ADMIN — INSULIN DETEMIR 10 UNITS: 100 INJECTION, SOLUTION SUBCUTANEOUS at 08:19

## 2023-10-18 RX ADMIN — HEPARIN SODIUM 5000 UNITS: 5000 INJECTION INTRAVENOUS; SUBCUTANEOUS at 06:42

## 2023-10-18 RX ADMIN — Medication 10 ML: at 20:13

## 2023-10-18 RX ADMIN — INSULIN HUMAN 5 UNITS: 100 INJECTION, SOLUTION PARENTERAL at 11:55

## 2023-10-18 RX ADMIN — METRONIDAZOLE 500 MG: 500 INJECTION, SOLUTION INTRAVENOUS at 14:27

## 2023-10-18 RX ADMIN — INSULIN HUMAN 8 UNITS: 100 INJECTION, SOLUTION PARENTERAL at 11:53

## 2023-10-18 RX ADMIN — SACUBITRIL AND VALSARTAN 1 TABLET: 24; 26 TABLET, FILM COATED ORAL at 08:23

## 2023-10-18 RX ADMIN — DONEPEZIL HYDROCHLORIDE 5 MG: 5 TABLET, FILM COATED ORAL at 20:13

## 2023-10-18 RX ADMIN — INSULIN HUMAN 8 UNITS: 100 INJECTION, SOLUTION PARENTERAL at 17:29

## 2023-10-18 RX ADMIN — INSULIN HUMAN 3 UNITS: 100 INJECTION, SOLUTION PARENTERAL at 06:42

## 2023-10-18 RX ADMIN — SERTRALINE HYDROCHLORIDE 75 MG: 25 TABLET ORAL at 08:18

## 2023-10-18 RX ADMIN — IVABRADINE 7.5 MG: 5 TABLET, FILM COATED ORAL at 17:29

## 2023-10-18 RX ADMIN — HEPARIN SODIUM 5000 UNITS: 5000 INJECTION INTRAVENOUS; SUBCUTANEOUS at 22:03

## 2023-10-18 RX ADMIN — Medication 10 ML: at 08:20

## 2023-10-18 RX ADMIN — SODIUM CHLORIDE 1000 MG: 900 INJECTION INTRAVENOUS at 05:12

## 2023-10-18 RX ADMIN — METRONIDAZOLE 500 MG: 500 INJECTION, SOLUTION INTRAVENOUS at 22:03

## 2023-10-18 RX ADMIN — HEPARIN SODIUM 5000 UNITS: 5000 INJECTION INTRAVENOUS; SUBCUTANEOUS at 14:26

## 2023-10-18 RX ADMIN — INSULIN HUMAN 5 UNITS: 100 INJECTION, SOLUTION PARENTERAL at 06:42

## 2023-10-18 RX ADMIN — FAMOTIDINE 20 MG: 10 INJECTION, SOLUTION INTRAVENOUS at 08:19

## 2023-10-18 RX ADMIN — FOLIC ACID 1 MG: 5 INJECTION, SOLUTION INTRAMUSCULAR; INTRAVENOUS; SUBCUTANEOUS at 08:20

## 2023-10-18 RX ADMIN — METRONIDAZOLE 500 MG: 500 INJECTION, SOLUTION INTRAVENOUS at 06:41

## 2023-10-18 RX ADMIN — SACUBITRIL AND VALSARTAN 1 TABLET: 24; 26 TABLET, FILM COATED ORAL at 22:04

## 2023-10-18 RX ADMIN — INSULIN DETEMIR 10 UNITS: 100 INJECTION, SOLUTION SUBCUTANEOUS at 20:12

## 2023-10-18 RX ADMIN — CLONAZEPAM 0.5 MG: 0.5 TABLET ORAL at 22:03

## 2023-10-18 RX ADMIN — SPIRONOLACTONE 25 MG: 25 TABLET ORAL at 08:19

## 2023-10-18 RX ADMIN — INSULIN HUMAN 5 UNITS: 100 INJECTION, SOLUTION PARENTERAL at 17:29

## 2023-10-18 NOTE — NURSING NOTE
[facility] follow up for Stoma care and assessment     Surgery date: 10/10/2023        Surgeon: Bonny     [facility] Care Outcome: Patient seen for ostomy care, assessment and education. Patient awake. Patient seen in the Med/Surg floor.  Son present at beside.     Stoma Type: Descending Colostomy  Diameter/shape: Round, 51 mm  Location: Left upper quadrant  Protrusion: Budded  Stoma Characteristics: Round, Edematous, Moist, and Pink  Peristomal skin: Clean dry and intact  Effluent Characteristics: Brown, Light, thickening up     Current pouching system: [place], 8331, flat, drainable.  Changed to [place] new image flat, drainable  Accessory products used:   Goal wear time: 5 to 7 days  Emptying frequency of appliance per day: Recommend emptying when 1/3-1/2 full.   Last appliance change: 10/18/2023     Follow up visit summary:  -Stoma viable, having good stool output  -Ostomy education reviewed with patient and son.  -Removed ostomy appliance and provided appliance change lesson with both patient and son.  -At discharge, plan is to go to rehab [place].  Patient requesting home health after rehab.  -Reviewed importance of  hydration.,ambulation and fiber in the patient's diet to prevent constipation.     [facility] Nurse will continue to follow daily for ostomy education. Please contact with questions or if new needs arise.      Homero Maloney RN, BSN, CCRN, CWOCN  Wound, Ostomy and Continence (WOC) Department  [facility]

## 2023-10-18 NOTE — PROGRESS NOTES
[facility] Medicine Services  PROGRESS NOTE    Patient Name: Iqra Britt  : 1950  MRN: [id number]    Date of Admission: 10/11/2023  Primary Care Physician: Georgie Fernández,     Subjective   Subjective     CC: ICU follow-up for SBO and new pelvic mass    HPI: No acute events overnight, patient says she rested okay, pain is controlled, she is tolerating some CLD this morning      Objective   Objective     Vital Signs:   Temp:  [97.5 °F (36.4 °C)-98.5 °F (36.9 °C)] 97.9 °F (36.6 °C)  Heart Rate:  [] 93  Resp:  [16-20] 20  BP: (109-142)/(54-73) 137/69  Flow (L/min):  [2] 2     Physical Exam:  Constitutional: Elderly female, in no acute distress, awake, alert  HENT: NCAT, mucous membranes moist  Respiratory: Nonlabored  Cardiovascular: RRR, no murmurs, rubs, or gallops  Gastrointestinal: Soft, mildly distended, incision CDI, staples in place, colostomy with good output.  Musculoskeletal: Bilateral lower extremity edema  Psychiatric: Appropriate affect, cooperative  Neurologic: Nonfocal    Results Reviewed:  LAB RESULTS:      Lab 10/18/23  0637 10/17/23  0546 10/17/23  0028 10/16/23  1818 10/16/23  1246 10/16/23  0639 10/16/23  0413 10/16/23  0339 10/16/23  0223 10/15/23  1019 10/15/23  0405 10/14/23  1255 10/14/23  0430 10/12/23  0416 10/11/23  1340   WBC 16.35* 14.67*  --  14.16*  --   --   --   --  18.21*  --  9.65  --    < > 9.53 14.40*   HEMOGLOBIN 8.8* 9.0* 8.8* 8.9* 7.0*  --    < >  --  7.7*   < > 9.8*  --    < > 10.8* 12.0   HEMOGLOBIN, POC  --   --   --   --   --   --   --   --   --    < >  --   --   --   --   --    HEMATOCRIT 28.4* 28.9* 27.7* 29.0* 22.6*  --    < >  --  25.3*   < > 33.3*  --    < > 34.2 38.9   HEMATOCRIT POC  --   --   --   --   --   --   --   --   --    < >  --   --   --   --   --    PLATELETS 172 190  --  194  --   --   --   --  341  --  332  --    < > 450 585*   NEUTROS ABS  --   --   --  12.33*  --   --   --   --   --   --   --   --    --  8.67* 13.15*   IMMATURE GRANS (ABS)  --   --   --  0.22*  --   --   --   --   --   --   --   --   --   --  0.13*   LYMPHS ABS  --   --   --  0.50*  --   --   --   --   --   --   --   --   --   --  0.25*   MONOS ABS  --   --   --  1.08*  --   --   --   --   --   --   --   --   --   --  0.84   EOS ABS  --   --   --  0.01  --   --   --   --   --   --   --   --   --  0.00 0.00   MCV 84.0 84.8  --  85.8  --   --   --   --  87.2  --  87.6  --    < > 81.6 82.4   CRP  --   --   --   --  13.36*  --   --   --   --   --   --  16.83*  --   --   --    LACTATE  --   --   --   --   --  1.2  --  2.7*  --   --   --   --   --   --   --    PROTIME  --   --   --   --  18.0*  --   --   --   --   --   --   --   --   --  16.9*    < > = values in this interval not displayed.         Lab 10/17/23  0546 10/16/23  1246 10/16/23  0223 10/15/23  1551 10/15/23  0508 10/14/23  2207 10/14/23  1255 10/14/23  0430 10/12/23  0416   SODIUM 144 148* 144  --  144  --  146*   < > 130*   POTASSIUM 4.5 3.9 4.7 4.4 3.8   < > 3.5   < > 4.0   CHLORIDE 117* 123* 116*  --  111*  --  107   < > 95*   CO2 21.0* 16.0* 17.0*  --  25.0  --  19.0*   < > 23.0   ANION GAP 6.0 9.0 11.0  --  8.0  --  20.0*   < > 12.0   BUN 35* 34* 38*  --  32*  --  34*   < > 58*   CREATININE 1.03* 0.92 1.15*  --  0.92  --  0.99   < > 1.52*   EGFR 57.9* 66.3 50.7*  --  66.3  --  60.7   < > 36.3*   GLUCOSE 299* 211* 268*  --  217*  --  152*   < > 285*   CALCIUM 7.5* 6.2* 6.9*  --  8.3*  --  8.1*   < > 7.9*   IONIZED CALCIUM  --  1.07*  --   --   --   --  1.29  --   --    MAGNESIUM 2.1  --  2.0  --  2.4  --  2.3  --   --    PHOSPHORUS 3.0 2.3* 2.1*  --  2.5  --  4.0  --   --    HEMOGLOBIN A1C  --   --   --   --   --   --   --   --  8.80*    < > = values in this interval not displayed.         Lab 10/16/23  1246 10/16/23  0223 10/14/23  1255 10/12/23  0416 10/11/23  1340   TOTAL PROTEIN 4.3* 4.7* 5.6* 6.1 7.2   ALBUMIN 2.1* 2.0* 2.6* 2.5* 3.3*   GLOBULIN 2.2 2.7 3.0 3.6 3.9   ALT (SGPT)  16 9 12 10 13   AST (SGOT) 59* 22 47* 30 31   BILIRUBIN 0.6 0.2 0.5 0.4 0.4   BILIRUBIN DIRECT 0.2  --  0.3  --   --    ALK PHOS 45 30* 54 46 56   LIPASE  --   --   --   --  7*         Lab 10/16/23  1246 10/11/23  1340   PROBNP  --  1,889.0*   HSTROP T  --  28*   PROTIME 18.0* 16.9*   INR 1.48* 1.36*         Lab 10/16/23  1246 10/14/23  1255   CHOLESTEROL  --  135   TRIGLYCERIDES 157* 192*         Lab 10/14/23  1357   ABO TYPING A   RH TYPING Positive   ANTIBODY SCREEN Negative         Lab 10/16/23  0412 10/15/23  1049 10/15/23  1019   PH, ARTERIAL 7.371 7.39 7.41   PCO2, ARTERIAL 34.4*  --   --    PO2 ART 85.6  --   --    FIO2 28  --   --    HCO3 ART 19.9*  --   --    BASE EXCESS ART -4.9*  --   --    CARBOXYHEMOGLOBIN 1.9  --   --      Brief Urine Lab Results  (Last result in the past 365 days)        Color   Clarity   Blood   Leuk Est   Nitrite   Protein   CREAT   Urine HCG        10/12/23 0425 Yellow   Cloudy   Moderate (2+)   Small (1+)   Negative   30 mg/dL (1+)                   Microbiology Results Abnormal       Procedure Component Value - Date/Time    Blood Culture - Blood, Hand, Left [[id number]]  (Normal) Collected: 10/16/23 0402    Lab Status: Preliminary result Specimen: Blood from Hand, Left Updated: 10/18/23 0600     Blood Culture No growth at 2 days    Narrative:      Aerobic bottle only    Blood Culture - Blood, Arm, Left [[id number]]  (Normal) Collected: 10/16/23 0414    Lab Status: Preliminary result Specimen: Blood from Arm, Left Updated: 10/18/23 0600     Blood Culture No growth at 2 days    Fungus Culture - Peritoneal Fluid, Peritoneum [[id number]] Collected: 10/12/23 1139    Lab Status: Preliminary result Specimen: Peritoneal Fluid from Peritoneum Updated: 10/17/23 1230     Fungus Culture No fungus isolated at less than 1 week    Anaerobic Culture - Body Fluid, Peritoneum [[id number]]  (Normal) Collected: 10/12/23 7541    Lab Status: Final result Specimen: Body Fluid from Peritoneum Updated:  10/17/23 0802     Anaerobic Culture No anaerobes isolated at 5 days    Body Fluid Culture - Body Fluid, Peritoneum [[id number]] Collected: 10/12/23 1139    Lab Status: Preliminary result Specimen: Body Fluid from Peritoneum Updated: 10/15/23 0836     Body Fluid Culture No growth at 3 days     Gram Stain Moderate (3+) WBCs per low power field      No organisms seen    Fungus Smear - Peritoneal Fluid, Peritoneum [[id number]] Collected: 10/12/23 1139    Lab Status: Final result Specimen: Peritoneal Fluid from Peritoneum Updated: 10/13/23 1338     Fungal Stain No fungal elements seen            No radiology results from the last 24 hrs        Current medications:  Scheduled Meds:cefTRIAXone, 1,000 mg, Intravenous, Q24H  donepezil, 5 mg, Nasogastric, Nightly  famotidine, 20 mg, Intravenous, Daily  Fat Emul Fish Oil/Plant Based, 200 mL, Intravenous, Q24H (TPN)  folic acid 1 mg in sodium chloride 0.9 % 50 mL IVPB, 1 mg, Intravenous, Daily  heparin (porcine), 5,000 Units, Subcutaneous, Q8H  insulin detemir, 10 Units, Subcutaneous, BID  insulin regular, 3-14 Units, Subcutaneous, Q6H  insulin regular, 5 Units, Subcutaneous, Q6H  ivabradine HCl, 7.5 mg, Nasogastric, BID With Meals  metroNIDAZOLE, 500 mg, Intravenous, Q8H  sacubitril-valsartan, 1 tablet, Nasogastric, BID  sertraline, 75 mg, Nasogastric, Daily  sodium chloride, 10 mL, Intravenous, Q12H  spironolactone, 25 mg, Nasogastric, Daily  thiamine (B-1) IV, 100 mg, Intravenous, Daily      Continuous Infusions:Adult Central 2-in-1 TPN, , Last Rate: 60 mL/hr at 10/17/23 1745  Pharmacy to Dose TPN,       PRN Meds:.  acetaminophen    Calcium Replacement - Follow Nurse / BPA Driven Protocol    dextrose    dextrose    glucagon (human recombinant)    hydrALAZINE    HYDROmorphone    ipratropium-albuterol    LORazepam    Magnesium Standard Dose Replacement - Follow Nurse / BPA Driven Protocol    ondansetron    Pharmacy to Dose TPN    phenol    Phosphorus Replacement - Follow Nurse /  BPA Driven Protocol    Potassium Replacement - Follow Nurse / BPA Driven Protocol    [COMPLETED] Insert Peripheral IV **AND** sodium chloride    sodium chloride    sodium chloride    Assessment & Plan   Assessment & Plan     Active Hospital Problems    Diagnosis  POA    **SBO (small bowel obstruction) [K56.609]  Yes    Ovarian mass [N83.8]  Yes    COPD (chronic obstructive pulmonary disease) [J44.9]  Yes    Chronic HFrEF (heart failure with reduced ejection fraction) [I50.22]  Yes    CKD (chronic kidney disease), stage III [N18.30]  Yes    Type 2 diabetes mellitus [E11.9]  Yes      Resolved Hospital Problems   No resolved problems to display.        Brief Hospital Course to date:  Iqra Britt is a 72 y.o. female with history of type 2 diabetes, CKD stage III, NICM and current endocarditis, HFrEF with EF 26% who presented to [facility] 10/11/2023 with nausea and vomiting, found to have a pelvic mass with ascites and SBO.  She is s/p ex lap, ostomy and right salpingo oophorectomy and later required ICU care postoperatively.  Patient was on pressors that have since been weaned, she has been started on TPN, he was deemed stable for transfer to [facility] medicine assumed her care 10/18/2023    SBO secondary to Ovarian mass  -GYN following, she is s/p ex lap, ostomy and right salpingo-oophorectomy 10/15  -Continue postop care per gyn/onc  -Pathology report pending    Malnutrition  -NG tube i has been discontinued, started on CLD  -Currently on TPN, weaning    Chronic HFrEF  -Last echo with EF of 26%  -Cardiology following and managing meds, currently seems compensated    CKD stage III  -Renal functions currently stable    Poorly controlled type 2 diabetes with A1c 8.8%  -FSBG's reviewed and appropriate    COPD    Expected Discharge Location and Transportation: Rehab  Expected Discharge   Expected Discharge Date: 10/20/2023; Expected Discharge Time:      DVT prophylaxis:  Medical DVT prophylaxis orders are  present.     AM-PAC 6 Clicks Score (PT): 14 (10/17/23 1122)    CODE STATUS:   Code Status and Medical Interventions:   Ordered at: 10/11/23 0425     Level Of Support Discussed With:    Patient     Code Status (Patient has no pulse and is not breathing):    CPR (Attempt to Resuscitate)     Medical Interventions (Patient has pulse or is breathing):    Full Support       Carin Comer MD  10/18/23

## 2023-10-18 NOTE — CASE MANAGEMENT/SOCIAL WORK
Continued Stay Note   [facility]     Patient Name: Iqra Britt  MRN: [id number]  Today's Date: 10/18/2023    Admit Date: 10/11/2023    Plan: rehab   Discharge Plan       Row Name 10/18/23 0829       Plan    Plan rehab    Patient/Family in Agreement with Plan yes    Plan Comments I met with this patient at the bedside. She confirmed that she wanted a referral made to [facility] for rehab and that referral was placed on 10/17/23. She states that family can transport.  will continue to follow.    Final Discharge Disposition Code 03 - skilled nursing facility (SNF)                   Discharge Codes    No documentation.                 Expected Discharge Date and Time       Expected Discharge Date Expected Discharge Time    Oct 20, 2023               Francia Villareal RN

## 2023-10-18 NOTE — PROGRESS NOTES
[facility]  IP Progress Note      Chief Complaint/Reason for service: Nonischemic cardiomyopathy #2 tachycardia-resolved    Subjective   Subjective: The patient is awake and alert.  She denies chest pain or shortness of breath.  States she has had a bowel movement.    Past medical, surgical, social and family history reviewed in the patient's electronic medical record.    Objective     Vital Sign Min/Max for last 24 hours  Temp  Min: 97.5 °F (36.4 °C)  Max: 98.5 °F (36.9 °C)   BP  Min: 109/60  Max: 142/72   Pulse  Min: 89  Max: 105   Resp  Min: 16  Max: 20   SpO2  Min: 95 %  Max: 99 %   Flow (L/min)  Min: 2  Max: 2      Intake/Output Summary (Last 24 hours) at 10/18/2023 0707  Last data filed at 10/18/2023 0449  Gross per 24 hour   Intake 1276.6 ml   Output 1125 ml   Net 151.6 ml             Current Facility-Administered Medications:     acetaminophen (TYLENOL) tablet 650 mg, 650 mg, Nasogastric, Q4H PRN, Dana Draper MD, 650 mg at 10/17/23 2355    Adult Central 2-in-1 TPN, , Intravenous, Continuous, Aaron Valentine, PharmD, Last Rate: 60 mL/hr at 10/17/23 1745, New Bag at 10/17/23 1745    Calcium Replacement - Follow Nurse / BPA Driven Protocol, , Does not apply, PRN, Dana Draper MD    cefTRIAXone (ROCEPHIN) 1000 mg/100 mL 0.9% NS (MBP), 1,000 mg, Intravenous, Q24H, Staci Bales APRN, 1,000 mg at 10/18/23 0512    dextrose (D50W) (25 g/50 mL) IV injection 25 g, 25 g, Intravenous, Q15 Min PRN, Dana Draper MD    dextrose (GLUTOSE) oral gel 15 g, 15 g, Oral, Q15 Min PRN, Dana Draper MD    donepezil (ARICEPT) tablet 5 mg, 5 mg, Nasogastric, Nightly, Dana Draper MD, 5 mg at 10/17/23 2039    famotidine (PEPCID) injection 20 mg, 20 mg, Intravenous, Daily, Dana Draper MD, 20 mg at 10/17/23 0830    Fat Emul Fish Oil/Plant Based (SMOFLIPID) 20 % emulsion 200 mL, 200 mL, Intravenous, Q24H (TPN), Francisco Roldan, [facility], Last Rate:  16.67 mL/hr at 10/17/23 1750, 200 mL at 10/17/23 1750    folic acid 1 mg in sodium chloride 0.9 % 50 mL IVPB, 1 mg, Intravenous, Daily, Jg Grijalva, PharmD, Last Rate: 100 mL/hr at 10/17/23 0828, 1 mg at 10/17/23 0828    glucagon (GLUCAGEN) injection 1 mg, 1 mg, Intramuscular, Q15 Min PRN, Dana Draper MD    heparin (porcine) 5000 UNIT/ML injection 5,000 Units, 5,000 Units, Subcutaneous, Q8H, Dana Draper MD, 5,000 Units at 10/18/23 0642    hydrALAZINE (APRESOLINE) injection 10 mg, 10 mg, Intravenous, Q6H PRN, Dana Darper MD    HYDROmorphone (DILAUDID) injection 0.25 mg, 0.25 mg, Intravenous, Q4H PRN, Elizabeth Dietz MD, 0.25 mg at 10/17/23 1408    insulin detemir (LEVEMIR) injection 10 Units, 10 Units, Subcutaneous, BID, Case, Regla V., DO, 10 Units at 10/17/23 2039    insulin regular (humuLIN R,novoLIN R) injection 3-14 Units, 3-14 Units, Subcutaneous, Q6H, Dana Draper MD, 3 Units at 10/18/23 0642    insulin regular (humuLIN R,novoLIN R) injection 5 Units, 5 Units, Subcutaneous, Q6H, Case, Regla V., DO, 5 Units at 10/18/23 0642    ipratropium-albuterol (DUO-NEB) nebulizer solution 3 mL, 3 mL, Nebulization, Q4H PRN, Dana Draper MD    ivabradine HCl (CORLANOR) tablet 7.5 mg, 7.5 mg, Nasogastric, BID With Meals, Dana Draper MD, 7.5 mg at 10/17/23 1753    LORazepam (ATIVAN) injection 0.25 mg, 0.25 mg, Intravenous, Q6H PRN, Dana Draper MD    Magnesium Standard Dose Replacement - Follow Nurse / BPA Driven Protocol, , Does not apply, PRN, Dana Draper MD    metroNIDAZOLE (FLAGYL) IVPB 500 mg, 500 mg, Intravenous, Q8H, Staci Bales APRN, Last Rate: 100 mL/hr at 10/18/23 0641, 500 mg at 10/18/23 0641    ondansetron (ZOFRAN) injection 4 mg, 4 mg, Intravenous, Q6H PRN, Dana Draper MD, 4 mg at 10/16/23 0644    Pharmacy to Dose TPN, , Does not apply, Continuous PRN, Dana Draper MD    phenol (CHLORASEPTIC) 1.4 % liquid 1 spray,  1 spray, Mouth/Throat, Q2H PRN, Dana Draper MD, 1 spray at 10/17/23 1535    Phosphorus Replacement - Follow Nurse / BPA Driven Protocol, , Does not apply, Bonny ZENDEJAS Hope M., MD    Potassium Replacement - Follow Nurse / BPA Driven Protocol, , Does not apply, Bonny ZENDEJAS Hope M., MD    sacubitril-valsartan (ENTRESTO) 24-26 MG tablet 1 tablet, 1 tablet, Nasogastric, BID, Dana Draper MD, 1 tablet at 10/17/23 2039    sertraline (ZOLOFT) tablet 75 mg, 75 mg, Nasogastric, Daily, Dana Draper MD, 75 mg at 10/17/23 0843    [COMPLETED] Insert Peripheral IV, , , Once **AND** sodium chloride 0.9 % flush 10 mL, 10 mL, Intravenous, PRN, Dana Draper MD    sodium chloride 0.9 % flush 10 mL, 10 mL, Intravenous, Q12H, Dana Draper MD, 10 mL at 10/17/23 2039    sodium chloride 0.9 % flush 10 mL, 10 mL, Intravenous, PRN, Dana Draper MD    sodium chloride 0.9 % infusion 40 mL, 40 mL, Intravenous, PRN, Dana Draper MD    spironolactone (ALDACTONE) tablet 25 mg, 25 mg, Nasogastric, Daily, Dana Draper MD, 25 mg at 10/17/23 0843    thiamine (B-1) injection 100 mg, 100 mg, Intravenous, Daily, Jg Grijalva PharmD, 100 mg at 10/17/23 0830    Physical Exam: General pleasant frail female in bed 30 degree angle not dyspneic not tachypneic current heart rate 100        HEENT: No JVP.  No icterus.  NG tube present       Respiratory: Equal bilateral symmetrical expansion mostly clear on the left with decreased breath sounds on the right       Cardiovascular: Regular rate and rhythm without gallop or murmur and positive edema to palpation       GI: Distended       Lower Extremities: No lesions       Neuro: Facial expressions symmetrical moves all 4 extremities       Skin: Warm and dry       Psych: Pleasant affect    Results Review: Patient is a net +6.0 L.  Heart rates 90s to low 100.  Blood pressures 1 19-1 42    Radiology Results:  Imaging Results (Last 72 Hours)       ** No results  found for the last 72 hours. **            EKG: Paced atrial sensed ventricular rhythm    ECHO: Last known EF 26 to 28% at     Tele: Sinus rhythm    Assessment   Assessment/Plan: #1 nonischemic cardiomyopathy-the patient tolerated her surgery.  She is essentially lying flat without any JVP orthopnea.  She does have some edema which may be secondary to protein malnutrition.  We will check a BNP in the morning.  Patient is tolerating Entresto.  She is intolerant to beta-blockers  2 tachycardia-this is resolved on her Corlanor    Josue Romulo Olvera MD  10/18/23  07:07 EDT

## 2023-10-18 NOTE — PLAN OF CARE
Problem: Adult Inpatient Plan of Care  Goal: Plan of Care Review  Outcome: Ongoing, Progressing  Goal: Patient-Specific Goal (Individualized)  Outcome: Ongoing, Progressing  Goal: Absence of Hospital-Acquired Illness or Injury  Outcome: Ongoing, Progressing  Intervention: Identify and Manage Fall Risk  Recent Flowsheet Documentation  Taken 10/18/2023 1600 by Adriana Clemons RN  Safety Promotion/Fall Prevention:   activity supervised   assistive device/personal items within reach   clutter free environment maintained   toileting scheduled   room organization consistent   safety round/check completed  Taken 10/18/2023 1400 by Adriana Clemons RN  Safety Promotion/Fall Prevention:   activity supervised   assistive device/personal items within reach   clutter free environment maintained   toileting scheduled   safety round/check completed   room organization consistent  Taken 10/18/2023 1200 by Adriana Clemons RN  Safety Promotion/Fall Prevention:   activity supervised   assistive device/personal items within reach   clutter free environment maintained   toileting scheduled   safety round/check completed   room organization consistent  Taken 10/18/2023 1000 by Adriana Clemons RN  Safety Promotion/Fall Prevention:   activity supervised   assistive device/personal items within reach   clutter free environment maintained   toileting scheduled   safety round/check completed   room organization consistent  Taken 10/18/2023 0800 by Adriana Clemons RN  Safety Promotion/Fall Prevention:   activity supervised   assistive device/personal items within reach   clutter free environment maintained   toileting scheduled   safety round/check completed   room organization consistent  Intervention: Prevent Skin Injury  Recent Flowsheet Documentation  Taken 10/18/2023 1600 by Adriana Clemons RN  Body Position: (up in chair)   weight shifting   other (see comments)  Skin Protection:   adhesive use limited   tubing/devices free  from skin contact   transparent dressing maintained   skin-to-skin areas padded   skin-to-device areas padded  Taken 10/18/2023 1400 by Adraina Clemons RN  Body Position: weight shifting  Skin Protection:   adhesive use limited   tubing/devices free from skin contact   transparent dressing maintained   skin-to-skin areas padded   skin-to-device areas padded  Taken 10/18/2023 1200 by Adriana Clemons RN  Body Position: weight shifting  Skin Protection:   adhesive use limited   tubing/devices free from skin contact   transparent dressing maintained   skin-to-skin areas padded   skin-to-device areas padded  Taken 10/18/2023 1000 by Adriana Clemons RN  Body Position: weight shifting  Skin Protection:   adhesive use limited   tubing/devices free from skin contact   transparent dressing maintained   skin-to-device areas padded   skin-to-skin areas padded  Taken 10/18/2023 0800 by Adriana Clemons RN  Body Position: weight shifting  Skin Protection:   adhesive use limited   tubing/devices free from skin contact   transparent dressing maintained   skin-to-skin areas padded   skin-to-device areas padded  Intervention: Prevent and Manage VTE (Venous Thromboembolism) Risk  Recent Flowsheet Documentation  Taken 10/18/2023 1600 by Adriana Clemons RN  Activity Management:   ambulated in room   up in chair  Taken 10/18/2023 1400 by Adriana Clemons RN  Activity Management: activity encouraged  Taken 10/18/2023 1200 by Adriana Clemons RN  Activity Management: activity encouraged  Taken 10/18/2023 1000 by Adriana Clemons RN  Activity Management: activity encouraged  Taken 10/18/2023 0800 by Adriana Clemons RN  Activity Management: activity encouraged  Goal: Optimal Comfort and Wellbeing  Outcome: Ongoing, Progressing  Goal: Readiness for Transition of Care  Outcome: Ongoing, Progressing     Problem: Skin Injury Risk Increased  Goal: Skin Health and Integrity  Outcome: Ongoing, Progressing  Intervention: Optimize Skin  Protection  Recent Flowsheet Documentation  Taken 10/18/2023 1600 by Adriana Clemons RN  Pressure Reduction Techniques: frequent weight shift encouraged  Head of Bed ([facility]) Positioning: [facility] elevated  Pressure Reduction Devices: pressure-redistributing mattress utilized  Skin Protection:   adhesive use limited   tubing/devices free from skin contact   transparent dressing maintained   skin-to-skin areas padded   skin-to-device areas padded  Taken 10/18/2023 1400 by Adriana Clemons RN  Pressure Reduction Techniques: frequent weight shift encouraged  Head of Bed ([facility]) Positioning: [facility] elevated  Pressure Reduction Devices: positioning supports utilized  Skin Protection:   adhesive use limited   tubing/devices free from skin contact   transparent dressing maintained   skin-to-skin areas padded   skin-to-device areas padded  Taken 10/18/2023 1200 by Adriana Clemons RN  Pressure Reduction Techniques: frequent weight shift encouraged  Head of Bed ([facility]) Positioning: [facility] elevated  Pressure Reduction Devices: positioning supports utilized  Skin Protection:   adhesive use limited   tubing/devices free from skin contact   transparent dressing maintained   skin-to-skin areas padded   skin-to-device areas padded  Taken 10/18/2023 1000 by Adriana Clemons RN  Pressure Reduction Techniques: frequent weight shift encouraged  Head of Bed ([facility]) Positioning: [facility] elevated  Pressure Reduction Devices: positioning supports utilized  Skin Protection:   adhesive use limited   tubing/devices free from skin contact   transparent dressing maintained   skin-to-device areas padded   skin-to-skin areas padded  Taken 10/18/2023 0800 by Adriana Clemons RN  Pressure Reduction Techniques: frequent weight shift encouraged  Head of Bed ([facility]) Positioning: [facility] elevated  Pressure Reduction Devices: positioning supports utilized  Skin Protection:   adhesive use limited   tubing/devices free from skin contact   transparent dressing maintained    skin-to-skin areas padded   skin-to-device areas padded     Problem: Pain Acute  Goal: Acceptable Pain Control and Functional Ability  Outcome: Ongoing, Progressing     Problem: Fall Injury Risk  Goal: Absence of Fall and Fall-Related Injury  Outcome: Ongoing, Progressing  Intervention: Promote Injury-Free Environment  Recent Flowsheet Documentation  Taken 10/18/2023 1600 by Adriana Clemons RN  Safety Promotion/Fall Prevention:   activity supervised   assistive device/personal items within reach   clutter free environment maintained   toileting scheduled   room organization consistent   safety round/check completed  Taken 10/18/2023 1400 by Adriana Clemons RN  Safety Promotion/Fall Prevention:   activity supervised   assistive device/personal items within reach   clutter free environment maintained   toileting scheduled   safety round/check completed   room organization consistent  Taken 10/18/2023 1200 by Adriana Clemons RN  Safety Promotion/Fall Prevention:   activity supervised   assistive device/personal items within reach   clutter free environment maintained   toileting scheduled   safety round/check completed   room organization consistent  Taken 10/18/2023 1000 by Adriana Clemons RN  Safety Promotion/Fall Prevention:   activity supervised   assistive device/personal items within reach   clutter free environment maintained   toileting scheduled   safety round/check completed   room organization consistent  Taken 10/18/2023 0800 by Adriana Clemons RN  Safety Promotion/Fall Prevention:   activity supervised   assistive device/personal items within reach   clutter free environment maintained   toileting scheduled   safety round/check completed   room organization consistent     Problem: Adjustment to Illness (Sepsis/Septic Shock)  Goal: Optimal Coping  Outcome: Ongoing, Progressing     Problem: Bleeding (Sepsis/Septic Shock)  Goal: Absence of Bleeding  Outcome: Ongoing, Progressing     Problem:  Glycemic Control Impaired (Sepsis/Septic Shock)  Goal: Blood Glucose Level Within Desired Range  Outcome: Ongoing, Progressing     Problem: Infection Progression (Sepsis/Septic Shock)  Goal: Absence of Infection Signs and Symptoms  Outcome: Ongoing, Progressing  Intervention: Promote Recovery  Recent Flowsheet Documentation  Taken 10/18/2023 1600 by Adriana Clemons RN  Activity Management:   ambulated in room   up in chair  Taken 10/18/2023 1400 by Adriana Clemons RN  Activity Management: activity encouraged  Taken 10/18/2023 1200 by Adriana Clemons RN  Activity Management: activity encouraged  Taken 10/18/2023 1000 by Adriana Clemons RN  Activity Management: activity encouraged  Taken 10/18/2023 0800 by Adriana Clemons RN  Activity Management: activity encouraged     Problem: Nutrition Impaired (Sepsis/Septic Shock)  Goal: Optimal Nutrition Intake  Outcome: Ongoing, Progressing   Goal Outcome Evaluation:

## 2023-10-18 NOTE — PROGRESS NOTES
CPN Review Note    Patient Name: Iqra Britt  Date of Encounter: 10/18/23 12:01 EDT  MRN: [id number]  Admission date: 10/11/2023    Reason for visit: CPN review . RD to continue to follow per protocol.     Information Obtained: NG clamped and regular diet started, tolerated clears. TPN to wean per Dr. Comer, discussed with pharmacy. Visited with pt at bedside, has yet to try any solids but tolerating boost. Encouraged to slowly try some solids as tolerated continuing to utilize boost as needed. RD will monitor PO intake progress/tolerance.    EMR reviewed:    Medication reviewed:   Labs reviewed:     Current diet: Adult Central 2-in-1 TPN  Diet: Diabetic Diets, Regular/House Diet, Gastrointestinal Diets; Consistent Carbohydrate; Fiber-Restricted; Fluid Consistency: Thin (IDDSI 0)    PN Route: PICC  PN: D15% AA5% @60ml/hr                GIR: 2.6 mg/kg/min  Lipid: 200 ml 20% SMOF    PN@ Goal over 24hr to Supply:  Volume 1450 mL/day     Energy 1422 Kcal/day 98 % Est Need   Protein 72 g/day 100 % Est Need     ---------------------------------------------------------------------------  Formula/Rate verified at bedside: Yes  Infusing Rate at time of visit: 30 ml/hr      Intervention:  Follow treatment plan  Care plan reviewed    Wean PN per protocol - continue at 30 ml/hr(half goal rate) and let current bag run out  *BG checks while weaning    Education/encouragement provided, will send Boost all meals    LF added to diet order as is indicated with resolving SBO and new colostomy    Follow up:   Per protocol      Harmony Carcamo RD, [facility]  12:01 EDT  Time: 25 min

## 2023-10-19 LAB
ANION GAP SERPL CALCULATED.3IONS-SCNC: 7 MMOL/L (ref 5–15)
BACTERIA FLD CULT: NORMAL
BASOPHILS # BLD MANUAL: 0 10*3/MM3 (ref 0–0.2)
BASOPHILS NFR BLD MANUAL: 0 % (ref 0–1.5)
BUN SERPL-MCNC: 22 MG/DL (ref 8–23)
BUN/CREAT SERPL: 32.4 (ref 7–25)
CALCIUM SPEC-SCNC: 7.7 MG/DL (ref 8.6–10.5)
CEA SERPL-MCNC: 59.2 NG/ML
CHLORIDE SERPL-SCNC: 113 MMOL/L (ref 98–107)
CO2 SERPL-SCNC: 21 MMOL/L (ref 22–29)
CREAT SERPL-MCNC: 0.68 MG/DL (ref 0.57–1)
DEPRECATED RDW RBC AUTO: 48.1 FL (ref 37–54)
EGFRCR SERPLBLD CKD-EPI 2021: 92.7 ML/MIN/1.73
EOSINOPHIL # BLD MANUAL: 0 10*3/MM3 (ref 0–0.4)
EOSINOPHIL NFR BLD MANUAL: 0 % (ref 0.3–6.2)
ERYTHROCYTE [DISTWIDTH] IN BLOOD BY AUTOMATED COUNT: 16 % (ref 12.3–15.4)
FUNGUS WND CULT: NORMAL
GLUCOSE BLDC GLUCOMTR-MCNC: 144 MG/DL (ref 70–130)
GLUCOSE BLDC GLUCOMTR-MCNC: 145 MG/DL (ref 70–130)
GLUCOSE BLDC GLUCOMTR-MCNC: 159 MG/DL (ref 70–130)
GLUCOSE BLDC GLUCOMTR-MCNC: 167 MG/DL (ref 70–130)
GLUCOSE BLDC GLUCOMTR-MCNC: 57 MG/DL (ref 70–130)
GLUCOSE SERPL-MCNC: 60 MG/DL (ref 65–99)
GRAM STN SPEC: NORMAL
GRAM STN SPEC: NORMAL
HCT VFR BLD AUTO: 29.4 % (ref 34–46.6)
HGB BLD-MCNC: 9.4 G/DL (ref 12–15.9)
LYMPHOCYTES # BLD MANUAL: 0.48 10*3/MM3 (ref 0.7–3.1)
LYMPHOCYTES NFR BLD MANUAL: 2 % (ref 5–12)
MAGNESIUM SERPL-MCNC: 2 MG/DL (ref 1.6–2.4)
MCH RBC QN AUTO: 26.5 PG (ref 26.6–33)
MCHC RBC AUTO-ENTMCNC: 32 G/DL (ref 31.5–35.7)
MCV RBC AUTO: 82.8 FL (ref 79–97)
METAMYELOCYTES NFR BLD MANUAL: 4 % (ref 0–0)
MONOCYTES # BLD: 0.32 10*3/MM3 (ref 0.1–0.9)
MYELOCYTES NFR BLD MANUAL: 1 % (ref 0–0)
NEUTROPHILS # BLD AUTO: 14.52 10*3/MM3 (ref 1.7–7)
NEUTROPHILS NFR BLD MANUAL: 87 % (ref 42.7–76)
NEUTS BAND NFR BLD MANUAL: 3 % (ref 0–5)
NT-PROBNP SERPL-MCNC: 8129 PG/ML (ref 0–900)
PHOSPHATE SERPL-MCNC: 2.2 MG/DL (ref 2.5–4.5)
PHOSPHATE SERPL-MCNC: 2.6 MG/DL (ref 2.5–4.5)
PLAT MORPH BLD: NORMAL
PLATELET # BLD AUTO: 170 10*3/MM3 (ref 140–450)
PMV BLD AUTO: 11.5 FL (ref 6–12)
POTASSIUM SERPL-SCNC: 3.7 MMOL/L (ref 3.5–5.2)
RBC # BLD AUTO: 3.55 10*6/MM3 (ref 3.77–5.28)
RBC MORPH BLD: NORMAL
SODIUM SERPL-SCNC: 141 MMOL/L (ref 136–145)
VARIANT LYMPHS NFR BLD MANUAL: 3 % (ref 19.6–45.3)
WBC MORPH BLD: NORMAL
WBC NRBC COR # BLD: 16.13 10*3/MM3 (ref 3.4–10.8)

## 2023-10-19 PROCEDURE — 84100 ASSAY OF PHOSPHORUS: CPT | Performed by: OBSTETRICS & GYNECOLOGY

## 2023-10-19 PROCEDURE — 83735 ASSAY OF MAGNESIUM: CPT | Performed by: OBSTETRICS & GYNECOLOGY

## 2023-10-19 PROCEDURE — 97530 THERAPEUTIC ACTIVITIES: CPT

## 2023-10-19 PROCEDURE — 63710000001 INSULIN DETEMIR PER 5 UNITS: Performed by: INTERNAL MEDICINE

## 2023-10-19 PROCEDURE — 85007 BL SMEAR W/DIFF WBC COUNT: CPT | Performed by: INTERNAL MEDICINE

## 2023-10-19 PROCEDURE — 25010000002 HEPARIN (PORCINE) PER 1000 UNITS: Performed by: OBSTETRICS & GYNECOLOGY

## 2023-10-19 PROCEDURE — 25010000002 METRONIDAZOLE 500 MG/100ML SOLUTION: Performed by: INTERNAL MEDICINE

## 2023-10-19 PROCEDURE — 63710000001 INSULIN REGULAR HUMAN PER 5 UNITS: Performed by: INTERNAL MEDICINE

## 2023-10-19 PROCEDURE — 99222 1ST HOSP IP/OBS MODERATE 55: CPT | Performed by: INTERNAL MEDICINE

## 2023-10-19 PROCEDURE — 99232 SBSQ HOSP IP/OBS MODERATE 35: CPT | Performed by: INTERNAL MEDICINE

## 2023-10-19 PROCEDURE — 25010000002 CEFTRIAXONE PER 250 MG: Performed by: INTERNAL MEDICINE

## 2023-10-19 PROCEDURE — 63710000001 INSULIN LISPRO (HUMAN) PER 5 UNITS: Performed by: INTERNAL MEDICINE

## 2023-10-19 PROCEDURE — 84100 ASSAY OF PHOSPHORUS: CPT | Performed by: INTERNAL MEDICINE

## 2023-10-19 PROCEDURE — 82378 CARCINOEMBRYONIC ANTIGEN: CPT | Performed by: INTERNAL MEDICINE

## 2023-10-19 PROCEDURE — 82948 REAGENT STRIP/BLOOD GLUCOSE: CPT

## 2023-10-19 PROCEDURE — 83880 ASSAY OF NATRIURETIC PEPTIDE: CPT | Performed by: INTERNAL MEDICINE

## 2023-10-19 PROCEDURE — 25010000002 FUROSEMIDE PER 20 MG: Performed by: INTERNAL MEDICINE

## 2023-10-19 PROCEDURE — 80048 BASIC METABOLIC PNL TOTAL CA: CPT | Performed by: OBSTETRICS & GYNECOLOGY

## 2023-10-19 PROCEDURE — 85025 COMPLETE CBC W/AUTO DIFF WBC: CPT | Performed by: INTERNAL MEDICINE

## 2023-10-19 PROCEDURE — 97110 THERAPEUTIC EXERCISES: CPT

## 2023-10-19 PROCEDURE — 99024 POSTOP FOLLOW-UP VISIT: CPT | Performed by: OBSTETRICS & GYNECOLOGY

## 2023-10-19 RX ORDER — INSULIN LISPRO 100 [IU]/ML
2-7 INJECTION, SOLUTION INTRAVENOUS; SUBCUTANEOUS
Status: DISCONTINUED | OUTPATIENT
Start: 2023-10-19 | End: 2023-10-20 | Stop reason: HOSPADM

## 2023-10-19 RX ORDER — CLONAZEPAM 0.5 MG/1
0.5 TABLET ORAL EVERY 12 HOURS SCHEDULED
Status: DISCONTINUED | OUTPATIENT
Start: 2023-10-19 | End: 2023-10-20 | Stop reason: HOSPADM

## 2023-10-19 RX ORDER — NICOTINE POLACRILEX 4 MG
15 LOZENGE BUCCAL
Status: DISCONTINUED | OUTPATIENT
Start: 2023-10-19 | End: 2023-10-19 | Stop reason: SDUPTHER

## 2023-10-19 RX ORDER — DEXTROSE MONOHYDRATE 25 G/50ML
25 INJECTION, SOLUTION INTRAVENOUS
Status: DISCONTINUED | OUTPATIENT
Start: 2023-10-19 | End: 2023-10-19 | Stop reason: SDUPTHER

## 2023-10-19 RX ORDER — FUROSEMIDE 10 MG/ML
40 INJECTION INTRAMUSCULAR; INTRAVENOUS ONCE
Status: COMPLETED | OUTPATIENT
Start: 2023-10-19 | End: 2023-10-19

## 2023-10-19 RX ADMIN — POTASSIUM & SODIUM PHOSPHATES POWDER PACK 280-160-250 MG 2 PACKET: 280-160-250 PACK at 06:44

## 2023-10-19 RX ADMIN — SODIUM CHLORIDE 1000 MG: 900 INJECTION INTRAVENOUS at 05:25

## 2023-10-19 RX ADMIN — INSULIN LISPRO 2 UNITS: 100 INJECTION, SOLUTION INTRAVENOUS; SUBCUTANEOUS at 11:29

## 2023-10-19 RX ADMIN — SACUBITRIL AND VALSARTAN 1 TABLET: 24; 26 TABLET, FILM COATED ORAL at 08:07

## 2023-10-19 RX ADMIN — Medication 10 ML: at 21:02

## 2023-10-19 RX ADMIN — SPIRONOLACTONE 25 MG: 25 TABLET, FILM COATED ORAL at 08:05

## 2023-10-19 RX ADMIN — DEXTROSE MONOHYDRATE 25 G: 25 INJECTION, SOLUTION INTRAVENOUS at 05:34

## 2023-10-19 RX ADMIN — INSULIN HUMAN 3 UNITS: 100 INJECTION, SOLUTION PARENTERAL at 11:29

## 2023-10-19 RX ADMIN — THIAMINE HCL TAB 100 MG 100 MG: 100 TAB at 08:05

## 2023-10-19 RX ADMIN — CLONAZEPAM 0.5 MG: 0.5 TABLET ORAL at 09:57

## 2023-10-19 RX ADMIN — FAMOTIDINE 20 MG: 10 INJECTION, SOLUTION INTRAVENOUS at 08:06

## 2023-10-19 RX ADMIN — METRONIDAZOLE 500 MG: 500 INJECTION, SOLUTION INTRAVENOUS at 06:43

## 2023-10-19 RX ADMIN — HEPARIN SODIUM 5000 UNITS: 5000 INJECTION INTRAVENOUS; SUBCUTANEOUS at 05:25

## 2023-10-19 RX ADMIN — IVABRADINE 7.5 MG: 5 TABLET, FILM COATED ORAL at 17:16

## 2023-10-19 RX ADMIN — HEPARIN SODIUM 5000 UNITS: 5000 INJECTION INTRAVENOUS; SUBCUTANEOUS at 20:59

## 2023-10-19 RX ADMIN — DONEPEZIL HYDROCHLORIDE 5 MG: 5 TABLET, FILM COATED ORAL at 20:59

## 2023-10-19 RX ADMIN — SERTRALINE HYDROCHLORIDE 75 MG: 25 TABLET ORAL at 08:06

## 2023-10-19 RX ADMIN — INSULIN DETEMIR 5 UNITS: 100 INJECTION, SOLUTION SUBCUTANEOUS at 20:59

## 2023-10-19 RX ADMIN — METRONIDAZOLE 500 MG: 500 INJECTION, SOLUTION INTRAVENOUS at 14:51

## 2023-10-19 RX ADMIN — FUROSEMIDE 40 MG: 10 INJECTION, SOLUTION INTRAMUSCULAR; INTRAVENOUS at 08:04

## 2023-10-19 RX ADMIN — IVABRADINE 7.5 MG: 5 TABLET, FILM COATED ORAL at 08:06

## 2023-10-19 RX ADMIN — INSULIN HUMAN 5 UNITS: 100 INJECTION, SOLUTION PARENTERAL at 00:03

## 2023-10-19 RX ADMIN — INSULIN LISPRO 2 UNITS: 100 INJECTION, SOLUTION INTRAVENOUS; SUBCUTANEOUS at 17:15

## 2023-10-19 RX ADMIN — CLONAZEPAM 0.5 MG: 0.5 TABLET ORAL at 20:59

## 2023-10-19 RX ADMIN — EMPAGLIFLOZIN 10 MG: 10 TABLET, FILM COATED ORAL at 08:06

## 2023-10-19 RX ADMIN — FOLIC ACID 1 MG: 1 TABLET ORAL at 08:05

## 2023-10-19 RX ADMIN — INSULIN HUMAN 3 UNITS: 100 INJECTION, SOLUTION PARENTERAL at 17:15

## 2023-10-19 RX ADMIN — SACUBITRIL AND VALSARTAN 1 TABLET: 24; 26 TABLET, FILM COATED ORAL at 21:03

## 2023-10-19 RX ADMIN — HEPARIN SODIUM 5000 UNITS: 5000 INJECTION INTRAVENOUS; SUBCUTANEOUS at 14:51

## 2023-10-19 RX ADMIN — METRONIDAZOLE 500 MG: 500 INJECTION, SOLUTION INTRAVENOUS at 21:02

## 2023-10-19 NOTE — NURSING NOTE
[facility] follow up for Stoma care and assessment     Surgery date: 10/10/2023        Surgeon: Bonny     [facility] Care Outcome: Patient seen for ostomy care, assessment and education. Patient awake. Patient seen in the Med/Surg floor.  Son present at beside.     Stoma Type: Descending Colostomy  Diameter/shape: Round, 51 mm  Location: Left upper quadrant  Protrusion: Budded  Stoma Characteristics: Round, Edematous, Moist, and Pink  Peristomal skin: Clean dry and intact  Effluent Characteristics: Brown, Light, thickening      Current pouching system: Changed to [employer] new image flat, drainable  Accessory products used: Stoma paste  Goal wear time: 5 to 7 days  Emptying frequency of appliance per day: Recommend emptying when 1/3-1/2 full.   Last appliance change: 10/18/2023     Follow up visit summary:  -Stoma viable, having good stool output  -No questions from an ostomy management standpoint this time.  -Waiting for visit from oncology MD.    -Patient and family have elected to return home at discharge.  Both patient and family would benefit from home health assistance in regards to ostomy care..      [facility] Nurse will continue to follow daily for ostomy education. Please contact with questions or if new needs arise.      Homero Maloney RN, BSN, CCRN, CWOCN  Wound, Ostomy and Continence (WOC) Department  [facility]

## 2023-10-19 NOTE — PLAN OF CARE
Problem: Adult Inpatient Plan of Care  Goal: Plan of Care Review  Outcome: Ongoing, Progressing  Goal: Patient-Specific Goal (Individualized)  Outcome: Ongoing, Progressing  Goal: Absence of Hospital-Acquired Illness or Injury  Outcome: Ongoing, Progressing  Intervention: Identify and Manage Fall Risk  Recent Flowsheet Documentation  Taken 10/19/2023 1400 by Adriana Clemons RN  Safety Promotion/Fall Prevention:   activity supervised   assistive device/personal items within reach   clutter free environment maintained   toileting scheduled   safety round/check completed   room organization consistent  Taken 10/19/2023 1200 by Adriana Clemons RN  Safety Promotion/Fall Prevention:   activity supervised   assistive device/personal items within reach   clutter free environment maintained   toileting scheduled   safety round/check completed   room organization consistent  Taken 10/19/2023 1000 by Adriana Clemons RN  Safety Promotion/Fall Prevention:   activity supervised   assistive device/personal items within reach   clutter free environment maintained   toileting scheduled   safety round/check completed   room organization consistent  Taken 10/19/2023 0800 by Adriana Clemons RN  Safety Promotion/Fall Prevention:   activity supervised   assistive device/personal items within reach   clutter free environment maintained   toileting scheduled   safety round/check completed   room organization consistent  Intervention: Prevent Skin Injury  Recent Flowsheet Documentation  Taken 10/19/2023 1400 by Adriana Clemons RN  Body Position: position changed independently  Skin Protection:   adhesive use limited   tubing/devices free from skin contact   transparent dressing maintained   skin-to-skin areas padded   skin-to-device areas padded  Taken 10/19/2023 1200 by Adriana Clemons RN  Body Position: position changed independently  Skin Protection:   adhesive use limited   tubing/devices free from skin contact    transparent dressing maintained   skin-to-skin areas padded   skin-to-device areas padded  Taken 10/19/2023 1000 by Adriana Clemons RN  Body Position: position changed independently  Skin Protection:   adhesive use limited   tubing/devices free from skin contact   transparent dressing maintained   skin-to-skin areas padded   skin-to-device areas padded  Taken 10/19/2023 0800 by Adriana Clemons RN  Body Position: position changed independently  Skin Protection:   adhesive use limited   tubing/devices free from skin contact   transparent dressing maintained   skin-to-skin areas padded   skin-to-device areas padded  Intervention: Prevent and Manage VTE (Venous Thromboembolism) Risk  Recent Flowsheet Documentation  Taken 10/19/2023 1400 by Adriana Clemons RN  Activity Management:   up to bedside commode   back to bed  Taken 10/19/2023 1200 by Adriana Clemons RN  Activity Management:   activity encouraged   up to bedside commode  Taken 10/19/2023 1000 by Adriana Clemons RN  Activity Management: activity encouraged  Taken 10/19/2023 0800 by Adriana Clemons RN  Activity Management: activity encouraged  Goal: Optimal Comfort and Wellbeing  Outcome: Ongoing, Progressing  Goal: Readiness for Transition of Care  Outcome: Ongoing, Progressing     Problem: Skin Injury Risk Increased  Goal: Skin Health and Integrity  Outcome: Ongoing, Progressing  Intervention: Optimize Skin Protection  Recent Flowsheet Documentation  Taken 10/19/2023 1400 by Adriana Clemons RN  Pressure Reduction Techniques: frequent weight shift encouraged  Head of Bed ([facility]) Positioning: [facility] elevated  Pressure Reduction Devices: pressure-redistributing mattress utilized  Skin Protection:   adhesive use limited   tubing/devices free from skin contact   transparent dressing maintained   skin-to-skin areas padded   skin-to-device areas padded  Taken 10/19/2023 1200 by Adriana Clemons RN  Pressure Reduction Techniques: frequent weight shift  encouraged  Head of Bed ([facility]) Positioning: [facility] elevated  Pressure Reduction Devices: pressure-redistributing mattress utilized  Skin Protection:   adhesive use limited   tubing/devices free from skin contact   transparent dressing maintained   skin-to-skin areas padded   skin-to-device areas padded  Taken 10/19/2023 1000 by Adriana Clemons RN  Pressure Reduction Techniques: frequent weight shift encouraged  Head of Bed ([facility]) Positioning: [facility] elevated  Pressure Reduction Devices: pressure-redistributing mattress utilized  Skin Protection:   adhesive use limited   tubing/devices free from skin contact   transparent dressing maintained   skin-to-skin areas padded   skin-to-device areas padded  Taken 10/19/2023 0800 by Adriana Clemons RN  Pressure Reduction Techniques: frequent weight shift encouraged  Head of Bed ([facility]) Positioning: [facility] elevated  Pressure Reduction Devices: pressure-redistributing mattress utilized  Skin Protection:   adhesive use limited   tubing/devices free from skin contact   transparent dressing maintained   skin-to-skin areas padded   skin-to-device areas padded     Problem: Pain Acute  Goal: Acceptable Pain Control and Functional Ability  Outcome: Ongoing, Progressing     Problem: Fall Injury Risk  Goal: Absence of Fall and Fall-Related Injury  Outcome: Ongoing, Progressing  Intervention: Promote Injury-Free Environment  Recent Flowsheet Documentation  Taken 10/19/2023 1400 by Adriana Clemons RN  Safety Promotion/Fall Prevention:   activity supervised   assistive device/personal items within reach   clutter free environment maintained   toileting scheduled   safety round/check completed   room organization consistent  Taken 10/19/2023 1200 by Adriana Clemons RN  Safety Promotion/Fall Prevention:   activity supervised   assistive device/personal items within reach   clutter free environment maintained   toileting scheduled   safety round/check completed   room organization  consistent  Taken 10/19/2023 1000 by Adriana Clemons RN  Safety Promotion/Fall Prevention:   activity supervised   assistive device/personal items within reach   clutter free environment maintained   toileting scheduled   safety round/check completed   room organization consistent  Taken 10/19/2023 0800 by Adriana Clemons RN  Safety Promotion/Fall Prevention:   activity supervised   assistive device/personal items within reach   clutter free environment maintained   toileting scheduled   safety round/check completed   room organization consistent     Problem: Adjustment to Illness (Sepsis/Septic Shock)  Goal: Optimal Coping  Outcome: Ongoing, Progressing     Problem: Bleeding (Sepsis/Septic Shock)  Goal: Absence of Bleeding  Outcome: Ongoing, Progressing     Problem: Glycemic Control Impaired (Sepsis/Septic Shock)  Goal: Blood Glucose Level Within Desired Range  Outcome: Ongoing, Progressing     Problem: Infection Progression (Sepsis/Septic Shock)  Goal: Absence of Infection Signs and Symptoms  Outcome: Ongoing, Progressing  Intervention: Promote Recovery  Recent Flowsheet Documentation  Taken 10/19/2023 1400 by Adriana Clemons RN  Activity Management:   up to bedside commode   back to bed  Taken 10/19/2023 1200 by Adriana Clemons RN  Activity Management:   activity encouraged   up to bedside commode  Taken 10/19/2023 1000 by Adriana Clemons RN  Activity Management: activity encouraged  Taken 10/19/2023 0800 by Adriana Clemons RN  Activity Management: activity encouraged     Problem: Nutrition Impaired (Sepsis/Septic Shock)  Goal: Optimal Nutrition Intake  Outcome: Ongoing, Progressing   Goal Outcome Evaluation:

## 2023-10-19 NOTE — PROGRESS NOTES
[facility] Medicine Services  PROGRESS NOTE    Patient Name: Iqra Britt  : 1950  MRN: [id number]    Date of Admission: 10/11/2023  Primary Care Physician: Georgie Fernández DO    Subjective   Subjective     CC: ICU follow-up for SBO and new pelvic mass    HPI: No acute events overnight, patient that she rested well, tolerating diet well      Objective   Objective     Vital Signs:   Temp:  [97.2 °F (36.2 °C)-98.2 °F (36.8 °C)] 97.4 °F (36.3 °C)  Heart Rate:  [88-97] 97  Resp:  [16-18] 18  BP: (130-140)/(63-76) 136/63     Physical Exam:  Constitutional: Elderly female, in no acute distress, awake, alert  HENT: NCAT, mucous membranes moist  Respiratory: Clear to auscultation bilaterally, respiratory effort normal   Cardiovascular: RRR, no murmurs, rubs, or gallops  Gastrointestinal: Soft, mild distention, incision CDI, staples in place, colostomy in place  Musculoskeletal: No bilateral ankle edema  Psychiatric: Appropriate affect, cooperative  Neurologic: Nonfocal  Skin: No rashes    Results Reviewed:  LAB RESULTS:      Lab 10/19/23  0401 10/18/23  0637 10/17/23  0546 10/17/23  0028 10/16/23  1818 10/16/23  1246 10/16/23  0639 10/16/23  0413 10/16/23  0339 10/16/23  0223 10/15/23  0405 10/14/23  1255   WBC 16.13* 16.35* 14.67*  --  14.16*  --   --   --   --  18.21*   < >  --    HEMOGLOBIN 9.4* 8.8* 9.0* 8.8* 8.9* 7.0*  --    < >  --  7.7*   < >  --    HEMOGLOBIN, POC  --   --   --   --   --   --   --   --   --   --    < >  --    HEMATOCRIT 29.4* 28.4* 28.9* 27.7* 29.0* 22.6*  --    < >  --  25.3*   < >  --    HEMATOCRIT POC  --   --   --   --   --   --   --   --   --   --    < >  --    PLATELETS 170 172 190  --  194  --   --   --   --  341   < >  --    NEUTROS ABS 14.52*  --   --   --  12.33*  --   --   --   --   --   --   --    IMMATURE GRANS (ABS)  --   --   --   --  0.22*  --   --   --   --   --   --   --    LYMPHS ABS  --   --   --   --  0.50*  --   --   --   --   --    --   --    MONOS ABS  --   --   --   --  1.08*  --   --   --   --   --   --   --    EOS ABS 0.00  --   --   --  0.01  --   --   --   --   --   --   --    MCV 82.8 84.0 84.8  --  85.8  --   --   --   --  87.2   < >  --    CRP  --   --   --   --   --  13.36*  --   --   --   --   --  16.83*   LACTATE  --   --   --   --   --   --  1.2  --  2.7*  --   --   --    PROTIME  --   --   --   --   --  18.0*  --   --   --   --   --   --     < > = values in this interval not displayed.         Lab 10/19/23  0401 10/18/23  0637 10/17/23  0546 10/16/23  1246 10/16/23  0223 10/15/23  1551 10/15/23  0508 10/14/23  2207 10/14/23  1255   SODIUM 141 142 144 148* 144  --  144  --  146*   POTASSIUM 3.7 4.4 4.5 3.9 4.7   < > 3.8   < > 3.5   CHLORIDE 113* 113* 117* 123* 116*  --  111*  --  107   CO2 21.0* 23.0 21.0* 16.0* 17.0*  --  25.0  --  19.0*   ANION GAP 7.0 6.0 6.0 9.0 11.0  --  8.0  --  20.0*   BUN 22 27* 35* 34* 38*  --  32*  --  34*   CREATININE 0.68 0.83 1.03* 0.92 1.15*  --  0.92  --  0.99   EGFR 92.7 75.0 57.9* 66.3 50.7*  --  66.3  --  60.7   GLUCOSE 60* 200* 299* 211* 268*  --  217*  --  152*   CALCIUM 7.7* 7.5* 7.5* 6.2* 6.9*  --  8.3*  --  8.1*   IONIZED CALCIUM  --   --   --  1.07*  --   --   --   --  1.29   MAGNESIUM 2.0 2.7* 2.1  --  2.0  --  2.4  --  2.3   PHOSPHORUS 2.2* 2.6 3.0 2.3* 2.1*  --  2.5  --  4.0    < > = values in this interval not displayed.         Lab 10/16/23  1246 10/16/23  0223 10/14/23  1255   TOTAL PROTEIN 4.3* 4.7* 5.6*   ALBUMIN 2.1* 2.0* 2.6*   GLOBULIN 2.2 2.7 3.0   ALT (SGPT) 16 9 12   AST (SGOT) 59* 22 47*   BILIRUBIN 0.6 0.2 0.5   BILIRUBIN DIRECT 0.2  --  0.3   ALK PHOS 45 30* 54         Lab 10/16/23  1246   PROTIME 18.0*   INR 1.48*         Lab 10/16/23  1246 10/14/23  1255   CHOLESTEROL  --  135   TRIGLYCERIDES 157* 192*         Lab 10/14/23  1357   ABO TYPING A   RH TYPING Positive   ANTIBODY SCREEN Negative         Lab 10/16/23  0412 10/15/23  1049 10/15/23  1019   PH, ARTERIAL 7.371  7.39 7.41   PCO2, ARTERIAL 34.4*  --   --    PO2 ART 85.6  --   --    FIO2 28  --   --    HCO3 ART 19.9*  --   --    BASE EXCESS ART -4.9*  --   --    CARBOXYHEMOGLOBIN 1.9  --   --      Brief Urine Lab Results  (Last result in the past 365 days)        Color   Clarity   Blood   Leuk Est   Nitrite   Protein   CREAT   Urine HCG        10/12/23 0425 Yellow   Cloudy   Moderate (2+)   Small (1+)   Negative   30 mg/dL (1+)                   Microbiology Results Abnormal       Procedure Component Value - Date/Time    Blood Culture - Blood, Hand, Left [[id number]]  (Normal) Collected: 10/16/23 0402    Lab Status: Preliminary result Specimen: Blood from Hand, Left Updated: 10/19/23 0600     Blood Culture No growth at 3 days    Narrative:      Aerobic bottle only    Blood Culture - Blood, Arm, Left [[id number]]  (Normal) Collected: 10/16/23 0414    Lab Status: Preliminary result Specimen: Blood from Arm, Left Updated: 10/19/23 0600     Blood Culture No growth at 3 days    Fungus Culture - Peritoneal Fluid, Peritoneum [[id number]] Collected: 10/12/23 1139    Lab Status: Preliminary result Specimen: Peritoneal Fluid from Peritoneum Updated: 10/17/23 1230     Fungus Culture No fungus isolated at less than 1 week    Anaerobic Culture - Body Fluid, Peritoneum [[id number]]  (Normal) Collected: 10/12/23 1139    Lab Status: Final result Specimen: Body Fluid from Peritoneum Updated: 10/17/23 0802     Anaerobic Culture No anaerobes isolated at 5 days    Body Fluid Culture - Body Fluid, Peritoneum [[id number]] Collected: 10/12/23 1139    Lab Status: Preliminary result Specimen: Body Fluid from Peritoneum Updated: 10/15/23 0836     Body Fluid Culture No growth at 3 days     Gram Stain Moderate (3+) WBCs per low power field      No organisms seen    Fungus Smear - Peritoneal Fluid, Peritoneum [[id number]] Collected: 10/12/23 1139    Lab Status: Final result Specimen: Peritoneal Fluid from Peritoneum Updated: 10/13/23 1338     Fungal Stain No  fungal elements seen            No radiology results from the last 24 hrs        Current medications:  Scheduled Meds:cefTRIAXone, 1,000 mg, Intravenous, Q24H  donepezil, 5 mg, Oral, Nightly  empagliflozin, 10 mg, Oral, Daily  famotidine, 20 mg, Intravenous, Daily  folic acid, 1 mg, Oral, Daily  furosemide, 40 mg, Intravenous, Once  heparin (porcine), 5,000 Units, Subcutaneous, Q8H  insulin detemir, 10 Units, Subcutaneous, BID  insulin regular, 3-14 Units, Subcutaneous, Q6H  insulin regular, 5 Units, Subcutaneous, Q6H  ivabradine HCl, 7.5 mg, Oral, BID With Meals  metroNIDAZOLE, 500 mg, Intravenous, Q8H  sacubitril-valsartan, 1 tablet, Oral, BID  sertraline, 75 mg, Oral, Daily  sodium chloride, 10 mL, Intravenous, Q12H  spironolactone, 25 mg, Oral, Daily  thiamine, 100 mg, Oral, Daily      Continuous Infusions:   PRN Meds:.  acetaminophen    Calcium Replacement - Follow Nurse / BPA Driven Protocol    clonazePAM    dextrose    dextrose    glucagon (human recombinant)    hydrALAZINE    HYDROmorphone    ipratropium-albuterol    Magnesium Standard Dose Replacement - Follow Nurse / BPA Driven Protocol    ondansetron    phenol    Phosphorus Replacement - Follow Nurse / BPA Driven Protocol    Potassium Replacement - Follow Nurse / BPA Driven Protocol    [COMPLETED] Insert Peripheral IV **AND** sodium chloride    sodium chloride    sodium chloride    Assessment & Plan   Assessment & Plan     Active Hospital Problems    Diagnosis  POA    **SBO (small bowel obstruction) [K56.609]  Yes    Ovarian mass [N83.8]  Yes    COPD (chronic obstructive pulmonary disease) [J44.9]  Yes    Chronic HFrEF (heart failure with reduced ejection fraction) [I50.22]  Yes    CKD (chronic kidney disease), stage III [N18.30]  Yes    Type 2 diabetes mellitus [E11.9]  Yes      Resolved Hospital Problems   No resolved problems to display.        Brief Hospital Course to date:  Iqra Britt is a 72 y.o. female with history of type 2 diabetes,  CKD stage III, NICM and current endocarditis, HFrEF with EF 26% who presented to [facility]/11/2023 with nausea and vomiting, found to have a pelvic mass with ascites and SBO.  She is s/p ex lap, ostomy and right salpingo oophorectomy and later required ICU care postoperatively.  Patient was on pressors that have since been weaned, she has been started on TPN, he was deemed stable for transfer to [facility], [facility] medicine assumed her care 10/18/2023     SBO secondary to Ovarian mass  Moderate differentiated adenocarcinoma  -GYN following, she is s/p ex lap, ostomy and right salpingo-oophorectomy 10/15  -Continue postop care per gyn/onc, staples out POD #10-14  -Pathology reveals moderately differentiated adenocarcinoma, favor GI as primary  - oncology consulted to assist     Malnutrition  -s/p NG tube, diet being advanced as tolerated, currently on regular  -Discontinue TPN     Chronic HFrEF  -Last echo with EF of 26%  -Cardiology following and managing meds, started on Entresto, Jardiance tolerating this okay  -Plan for 40 mg of IV Lasix x1 today   -Continue strict I&O's    CKD stage III  -Renal functions currently stable     Poorly controlled type 2 diabetes with A1c 8.8%  -FSBG's reviewed and appropriate     Anxiety  -Continue home Klonopin    COPD     Expected Discharge Location and Transportation: rehab  Expected Discharge   Expected Discharge Date: 10/23/2023; Expected Discharge Time:      DVT prophylaxis:  Medical DVT prophylaxis orders are present.     AM-PAC 6 Clicks Score (PT): 14 (10/18/23 0800)    CODE STATUS:   Code Status and Medical Interventions:   Ordered at: 10/11/23 9074     Level Of Support Discussed With:    Patient     Code Status (Patient has no pulse and is not breathing):    CPR (Attempt to Resuscitate)     Medical Interventions (Patient has pulse or is breathing):    Full Support       Carin Comer MD  10/19/23

## 2023-10-19 NOTE — PROGRESS NOTES
Gynecologic Oncology   Daily Progress Note    Chief Complaint: s/p ex lap, colostomy, RSO    Subjective   Patient did well overnight. Off pressors. Pain is tolerable.  She is not having nausea currently, but had a little overnight. Denies vomiting.  She it tolerating regular diet. Khan out and pt is voiding    Tolerated CLD yesterday    Inpatient Medications:     Current Facility-Administered Medications:     acetaminophen (TYLENOL) tablet 650 mg, 650 mg, Oral, Q4H PRN, Ajay Steele PharmD    Calcium Replacement - Follow Nurse / BPA Driven Protocol, , Does not apply, PRN, Dana Draper MD    cefTRIAXone (ROCEPHIN) 1000 mg/100 mL 0.9% NS (MBP), 1,000 mg, Intravenous, Q24H, Carin Comer MD, 1,000 mg at 10/19/23 0525    clonazePAM (KlonoPIN) tablet 0.5 mg, 0.5 mg, Oral, BID PRN, Tiffany Cristina, APRN, 0.5 mg at 10/18/23 2203    dextrose (D50W) (25 g/50 mL) IV injection 25 g, 25 g, Intravenous, Q15 Min PRN, Dana Draper MD, 25 g at 10/19/23 0534    dextrose (GLUTOSE) oral gel 15 g, 15 g, Oral, Q15 Min PRN, Dana Draper MD    donepezil (ARICEPT) tablet 5 mg, 5 mg, Oral, Nightly, Ajay Steele PharmD, 5 mg at 10/18/23 2013    famotidine (PEPCID) injection 20 mg, 20 mg, Intravenous, Daily, Dana Draper MD, 20 mg at 10/18/23 0819    folic acid (FOLVITE) tablet 1 mg, 1 mg, Oral, Daily, Ajay Steele PharmD    glucagon (GLUCAGEN) injection 1 mg, 1 mg, Intramuscular, Q15 Min PRN, Dana Draper MD    heparin (porcine) 5000 UNIT/ML injection 5,000 Units, 5,000 Units, Subcutaneous, Q8H, Dana Draper MD, 5,000 Units at 10/19/23 0525    hydrALAZINE (APRESOLINE) injection 10 mg, 10 mg, Intravenous, Q6H PRN, Dana Draper MD    HYDROmorphone (DILAUDID) injection 0.25 mg, 0.25 mg, Intravenous, Q4H PRN, Elizabeth Dietz MD, 0.25 mg at 10/17/23 1408    insulin detemir (LEVEMIR) injection 10 Units, 10 Units, Subcutaneous, BID, Regla Bullard DO, 10 Units at 10/18/23 2012     insulin regular (humuLIN R,novoLIN R) injection 3-14 Units, 3-14 Units, Subcutaneous, Q6H, Dana Draper MD, 8 Units at 10/18/23 1729    insulin regular (humuLIN R,novoLIN R) injection 5 Units, 5 Units, Subcutaneous, Q6H, Case, Regla V., DO, 5 Units at 10/19/23 0003    ipratropium-albuterol (DUO-NEB) nebulizer solution 3 mL, 3 mL, Nebulization, Q4H PRN, Dana Draper MD    ivabradine HCl (CORLANOR) tablet 7.5 mg, 7.5 mg, Oral, BID With Meals, Ajay Steele, PharmD, 7.5 mg at 10/18/23 1729    Magnesium Standard Dose Replacement - Follow Nurse / BPA Driven Protocol, , Does not apply, PRN, Dana Draper MD    metroNIDAZOLE (FLAGYL) IVPB 500 mg, 500 mg, Intravenous, Q8H, Carin Comer MD, Last Rate: 100 mL/hr at 10/18/23 2203, 500 mg at 10/18/23 2203    ondansetron (ZOFRAN) injection 4 mg, 4 mg, Intravenous, Q6H PRN, Dana Draper MD, 4 mg at 10/16/23 0644    phenol (CHLORASEPTIC) 1.4 % liquid 1 spray, 1 spray, Mouth/Throat, Q2H PRN, Dana Draper MD, 1 spray at 10/17/23 1535    Phosphorus Replacement - Follow Nurse / BPA Driven Protocol, , Does not apply, PRBonny PARDO Hope M., MD    potassium & sodium phosphates (PHOS-NAK) oral packet, 2 packet, Oral, Once, Carin Comer MD    Potassium Replacement - Follow Nurse / BPA Driven Protocol, , Does not apply, Bonny ZENDEJAS Hope M., MD    sacubitril-valsartan (ENTRESTO) 24-26 MG tablet 1 tablet, 1 tablet, Oral, BID, Ajay Steele, PharmD, 1 tablet at 10/18/23 2204    sertraline (ZOLOFT) tablet 75 mg, 75 mg, Oral, Daily, Ajay Steele, PharmD    [COMPLETED] Insert Peripheral IV, , , Once **AND** sodium chloride 0.9 % flush 10 mL, 10 mL, Intravenous, PRN, Dana Draper MD    sodium chloride 0.9 % flush 10 mL, 10 mL, Intravenous, Q12H, Dana Draper MD, 10 mL at 10/18/23 2013    sodium chloride 0.9 % flush 10 mL, 10 mL, Intravenous, PRN, Dana Draper MD    sodium chloride 0.9 % infusion 40 mL, 40 mL, Intravenous, PRN,  Dana Draper MD    spironolactone (ALDACTONE) tablet 25 mg, 25 mg, Oral, Daily, Ajay Steele, PharmD    thiamine (VITAMIN B-1) tablet 100 mg, 100 mg, Oral, Daily, Ajay Steele, PharmD     Objective   Temp:  [97.2 °F (36.2 °C)-98.2 °F (36.8 °C)] 97.4 °F (36.3 °C)  Heart Rate:  [88-97] 97  Resp:  [16-18] 18  BP: (130-140)/(63-76) 136/63  Vitals:    10/19/23 0523   BP: 136/63   Pulse: 97   Resp: 18   Temp: 97.4 °F (36.3 °C)   SpO2: 97%     I/O last 3 completed shifts:  In: 1396.6 [P.O.:120; I.V.:110.8; NG/GT:80; IV Piggyback:50]  Out: 2425 [Urine:2175; Stool:250]                 GENERAL: Alert, well-appearing female in no apparent distress.    CARDIOVASCULAR: Normal rate, regular rhythm, no murmurs, rubs, or gallops.    RESPIRATORY: Clear to auscultation bilaterally, normal respiratory effort  GASTROINTESTINAL:  tender throughout, distended,. hypoactive bowel sounds.  Incision(s) CDI staples in place. Colostomy bag with 15cc. Ostomy site pink and viable  GENITOURINARY: Khan in place  SKIN:  Warm, dry, well-perfused.    PSYCHIATRIC: AO x3, with appropriate affect, normal thought processes  EXREMITIES: Symmetric. No peripheral edema.    Lab Results   Component Value Date    WBC 16.13 (H) 10/19/2023    HGB 9.4 (L) 10/19/2023    HCT 29.4 (L) 10/19/2023    MCV 82.8 10/19/2023     10/19/2023    NEUTROABS 14.52 (H) 10/19/2023    GLUCOSE 60 (L) 10/19/2023    BUN 22 10/19/2023    CREATININE 0.68 10/19/2023    EGFRIFNONA 34 (L) 05/02/2022    EGFRIFAFRI 41 (L) 05/02/2022     10/19/2023    K 3.7 10/19/2023     (H) 10/19/2023    CO2 21.0 (L) 10/19/2023    MG 2.0 10/19/2023    CALCIUM 7.7 (L) 10/19/2023         Assessment & Plan   Iqra Britt is a 72 y.o. female POD4 s/p ex lap, RSO, lysis of adhesions, descending colostomy on 10/15 in setting of SBO and new pelvic mass    1.  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 Patient will benefit from rehab, otherwise from a postop standpoint is OK to discharge     2. Malnutrition  -s/p NGT  TPN  -PICC in place    3.  Significant comorbidities  -FIORDALIZA in the context of CKD stage III; Cr 0.68this AM  -CHF, seen by cardiology.  Moderate risk for surgical intervention    -Type 2 diabetes mellitus  -COPD    4.   Disposition  - Postoperatively stable to discharge. Rest of care per primary team       Alexandria Puente MD  10/19/23  06:40 EDT    I saw and evaluated the patient. I agree with the findings and the plan of care as documented in the note.  Anticipate discharge home 10/20/2023  Family to remove staples 10-14 days postop.  Nurse to place supplies at bedside.  We will do video visit for follow-up.  Patient/family decline rehabilitation facility.  Discussed case with Dr. Damico.    Dana Draper MD  10/19/23  15:08 EDT

## 2023-10-19 NOTE — THERAPY TREATMENT NOTE
Patient Name: Iqra Britt  : 1950    MRN: [id number]                              Today's Date: 10/19/2023       Admit Date: 10/11/2023    Visit Dx:     ICD-10-CM ICD-9-CM   1. Pelvic mass  R19.00 789.30   2. Partial intestinal obstruction, unspecified cause  K56.600 560.9   3. Nausea and vomiting, unspecified vomiting type  R11.2 787.01   4. Other ascites  R18.8 789.59   5. Hyperglycemia  R73.9 790.29   6. Renal insufficiency  N28.9 593.9   7. SBO (small bowel obstruction)  K56.609 560.9     Patient Active Problem List   Diagnosis    Ovarian mass    COPD (chronic obstructive pulmonary disease)    Chronic HFrEF (heart failure with reduced ejection fraction)    CKD (chronic kidney disease), stage III    Type 2 diabetes mellitus    SBO (small bowel obstruction)     Past Medical History:   Diagnosis Date    Arthritis     CHF (congestive heart failure)     COPD (chronic obstructive pulmonary disease)     Diabetes mellitus     Hyperlipidemia     Renal disorder      Past Surgical History:   Procedure Laterality Date    CHOLECYSTECTOMY      DILATATION AND CURETTAGE      EXPLORATORY LAPAROTOMY N/A 10/15/2023    Procedure: LAPAROTOMY EXPLORATORY, RIGHT SALPINGO OOPHORECTOMY, LYSIS OF ADHESIONS, DESCENDING COLOSOTOMY;  Surgeon: Dana Draper MD;  Location: [facility];  Service: Gynecology Oncology;  Laterality: N/A;      General Information       Row Name 10/19/23 1049          Physical Therapy Time and Intention    Document Type therapy note (daily note)  -     Mode of Treatment physical therapy  -       Row Name 10/19/23 1049          General Information    Patient Profile Reviewed yes  -     Existing Precautions/Restrictions fall;other (see comments)  abd incision w/ binder, colostomy  -     Barriers to Rehab medically complex  -       Row Name 10/19/23 1049          Cognition    Orientation Status (Cognition) oriented x 3  -       Row Name 10/19/23 1049          Safety Issues, Functional  Mobility    Safety Issues Affecting Function (Mobility) awareness of need for assistance;insight into deficits/self-awareness;safety precaution awareness;safety precautions follow-through/compliance;sequencing abilities  -     Impairments Affecting Function (Mobility) balance;endurance/activity tolerance;shortness of breath;strength;pain  -               User Key  (r) = Recorded By, (t) = Taken By, (c) = Cosigned By      Initials Name Provider Type     Diana Reis PT Physical Therapist                   Mobility       Row Name 10/19/23 1050          Bed Mobility    Bed Mobility supine-sit  -     Supine-Sit Bond (Bed Mobility) moderate assist (50% patient effort);1 person assist;verbal cues  -     Assistive Device (Bed Mobility) bed rails;head of bed elevated  -     Comment, (Bed Mobility) VCs for hand placement and sequencing using log-roll technique w/ splinting pillow. Required assist to advance B LEs off EOB and to upright trunk.  -[facility] Name 10/19/23 1050          Transfers    Comment, (Transfers) VCs for hand placement and sequencing. Increased knee flexion noted in standing  -[facility] Name 10/19/23 1050          Bed-Chair Transfer    Bed-Chair Bond (Transfers) minimum assist (75% patient effort);verbal cues  -     Assistive Device (Bed-Chair Transfers) other (see comments)  B UE support  -     Comment, (Bed-Chair Transfer) Stand pivot t/f bed>BSC and chair>bed after ambulation  -[facility] Name 10/19/23 1050          Sit-Stand Transfer    Sit-Stand Bond (Transfers) contact guard;verbal cues  -     Assistive Device (Sit-Stand Transfers) walker, front-wheeled  -     Comment, (Sit-Stand Transfer) STS x2 from EOB, x1 from BSC, x1 from recliner  -[facility] Name 10/19/23 1050          Gait/Stairs (Locomotion)    Bond Level (Gait) minimum assist (75% patient effort);verbal cues;other (see comments)  close chair follow  -     Assistive Device  (Gait) walker, front-wheeled  -     Distance in Feet (Gait) 45  -     Deviations/Abnormal Patterns (Gait) bilateral deviations;base of support, narrow;bhavana decreased;gait speed decreased;stride length decreased  -     Bilateral Gait Deviations heel strike decreased;forward flexed posture  -     Comment, (Gait/Stairs) Pt demo'd step to, shuffling gait pattern with decreased speed. VCs for sequencing, upright posture, and to increase step length. Followed closely with chair and pt required seated break and to be rolled back into room in recliner. Further distance limited d/t weakness and fatigue.  -               User Key  (r) = Recorded By, (t) = Taken By, (c) = Cosigned By      Initials Name Provider Type     Diana Reis PT Physical Therapist                   Obj/Interventions       Row Name 10/19/23 1058          Motor Skills    Therapeutic Exercise hip;knee;ankle  -[facility] Name 10/19/23 1058          Hip (Therapeutic Exercise)    Hip (Therapeutic Exercise) strengthening exercise;isometric exercises  -     Hip Isometrics (Therapeutic Exercise) bilateral;gluteal sets;10 repetitions;3 second hold  -     Hip Strengthening (Therapeutic Exercise) bilateral;aBduction;aDduction;heel slides;10 repetitions  -       Row Name 10/19/23 1058          Knee (Therapeutic Exercise)    Knee (Therapeutic Exercise) isometric exercises  -     Knee Isometrics (Therapeutic Exercise) bilateral;quad sets;10 repetitions;3 second hold  -       Row Name 10/19/23 1058          Ankle (Therapeutic Exercise)    Ankle (Therapeutic Exercise) AROM (active range of motion)  -     Ankle AROM (Therapeutic Exercise) bilateral;dorsiflexion;plantarflexion;10 repetitions  -       Row Name 10/19/23 1058          Balance    Balance Assessment sitting static balance;sitting dynamic balance;sit to stand dynamic balance;standing static balance;standing dynamic balance  -     Static Sitting Balance standby assist  -      Dynamic Sitting Balance contact guard  -     Position, Sitting Balance unsupported;sitting edge of bed;sitting in chair  -     Sit to Stand Dynamic Balance contact guard;verbal cues  -     Static Standing Balance contact guard;verbal cues  -     Dynamic Standing Balance minimal assist;verbal cues  -     Position/Device Used, Standing Balance supported;walker, front-wheeled  -     Balance Interventions sitting;standing;sit to stand;supported;static;dynamic  -     Comment, Balance No LOB noted  -               User Key  (r) = Recorded By, (t) = Taken By, (c) = Cosigned By      Initials Name Provider Type     Diana Reis, KRISH Physical Therapist                   Goals/Plan    No documentation.                  Clinical Impression       Row Name 10/19/23 1101          Pain    Pretreatment Pain Rating 0/10 - no pain  -     Posttreatment Pain Rating 0/10 - no pain  -     Pre/Posttreatment Pain Comment c/o mild abdominal discomfort w/ movement. tolerated  -     Pain Intervention(s) Repositioned;Ambulation/increased activity  -       Row Name 10/19/23 1101          Plan of Care Review    Plan of Care Reviewed With patient;son  -     Progress improving  -     Outcome Evaluation Pt demonstrating improvements thhis date by ambulating 45 ft w/ Ruthann and using FWW. Followed closely w/ chair d/t fatigue. Pt continues to present below baseline d/t abd pain and deficits in strength, balance, and activity tolerance. Pt would continue to benefit from skilled IP PT. Recommend IRF at d/c.  -       Row Name 10/19/23 1101          Vital Signs    Pre Systolic BP Rehab 122  -LH     Pre Treatment Diastolic BP 73  -     Post Systolic BP Rehab 115  -LH     Post Treatment Diastolic BP 65  -     Pretreatment Heart Rate (beats/min) 95  -     Posttreatment Heart Rate (beats/min) 105  -     Pre SpO2 (%) 98  -     O2 Delivery Pre Treatment room air  -     O2 Delivery Intra Treatment room air  -      Post SpO2 (%) 98  -     O2 Delivery Post Treatment room air  -     Pre Patient Position Supine  -     Intra Patient Position Standing  -     Post Patient Position Supine  -       Row Name 10/19/23 1101          Positioning and Restraints    Pre-Treatment Position in bed  -     Post Treatment Position bed  -     In Bed supine;call light within reach;encouraged to call for assist;exit alarm on;with family/caregiver;heels elevated;notified nsg  -               User Key  (r) = Recorded By, (t) = Taken By, (c) = Cosigned By      Initials Name Provider Type     Diana Reis, PT Physical Therapist                   Outcome Measures       Row Name 10/19/23 1104 10/19/23 0800       How much help from another person do you currently need...    Turning from your back to your side while in flat bed without using bedrails? 3  - 3  -SW    Moving from lying on back to sitting on the side of a flat bed without bedrails? 2  - 3  -SW    Moving to and from a bed to a chair (including a wheelchair)? 3  - 3  -SW    Standing up from a chair using your arms (e.g., wheelchair, bedside chair)? 3  - 3  -SW    Climbing 3-5 steps with a railing? 2  - 2  -SW    To walk in hospital room? 3  - 3  -SW    AM-PAC 6 Clicks Score (PT) 16  - 17  -SW    Highest level of mobility 5 --> Static standing  - 5 --> Static standing  -SW      Row Name 10/19/23 1104          Functional Assessment    Outcome Measure Options AM-PAC 6 Clicks Basic Mobility (PT)  -               User Key  (r) = Recorded By, (t) = Taken By, (c) = Cosigned By      Initials Name Provider Type    Adriana Medel RN Registered Nurse     Diana Reis, PT Physical Therapist                                 Physical Therapy Education       Title: PT OT SLP Therapies (In Progress)       Topic: Physical Therapy (Done)       Point: Mobility training (Done)       Learning Progress Summary             Patient Acceptance, E, VU,NR by  at  10/19/2023 1104    Acceptance, E,TB, NR by AY at 10/17/2023 1122    Acceptance, E,TB, NR by AY at 10/16/2023 1457    Acceptance, TB,E, VU,DU by  at 10/16/2023 1227    Acceptance, E, VU,NR by  at 10/12/2023 1357   Family Acceptance, E, VU,NR by  at 10/19/2023 1104    Acceptance, E,TB, NR by AY at 10/16/2023 1457    Acceptance, TB,E, VU,DU by  at 10/16/2023 1227    Acceptance, E, VU,NR by  at 10/12/2023 1357                         Point: Home exercise program (Done)       Learning Progress Summary             Patient Acceptance, E, VU,NR by  at 10/19/2023 1104    Acceptance, E,TB, NR by AY at 10/17/2023 1122    Acceptance, TB,E, VU,DU by  at 10/16/2023 1227   Family Acceptance, E, VU,NR by  at 10/19/2023 1104    Acceptance, TB,E, VU,DU by  at 10/16/2023 1227                         Point: Body mechanics (Done)       Learning Progress Summary             Patient Acceptance, E, VU,NR by  at 10/19/2023 1104    Acceptance, E,TB, NR by AY at 10/17/2023 1122    Acceptance, E,TB, NR by AY at 10/16/2023 1457    Acceptance, TB,E, VU,DU by  at 10/16/2023 1227    Acceptance, E, VU,NR by  at 10/12/2023 1357   Family Acceptance, E, VU,NR by  at 10/19/2023 1104    Acceptance, E,TB, NR by AY at 10/16/2023 1457    Acceptance, TB,E, VU,DU by  at 10/16/2023 1227    Acceptance, E, VU,NR by  at 10/12/2023 1357                         Point: Precautions (Done)       Learning Progress Summary             Patient Acceptance, E, VU,NR by  at 10/19/2023 1104    Acceptance, E,TB, NR by AY at 10/17/2023 1122    Acceptance, E,TB, NR by AY at 10/16/2023 1457    Acceptance, TB,E, VU,DU by  at 10/16/2023 1227    Acceptance, E, VU,NR by  at 10/12/2023 1357   Family Acceptance, E, VU,NR by  at 10/19/2023 1104    Acceptance, E,TB, NR by  at 10/16/2023 1457    Acceptance, TB,E, VU,DU by  at 10/16/2023 1227    Acceptance, E, VU,NR by  at 10/12/2023 1357                                         User Key        Initials Effective Dates Name Provider Type Discipline     06/16/21 -  Marybeth Donovan RN Registered Nurse Nurse     11/10/20 -  Saundra Benitez PT Physical Therapist PT     09/21/23 -  Diana Reis PT Physical Therapist PT                  PT Recommendation and Plan  Planned Therapy Interventions (PT): balance training, bed mobility training, gait training, home exercise program, neuromuscular re-education, patient/family education, postural re-education, ROM (range of motion), strengthening, stretching, transfer training  Plan of Care Reviewed With: patient, son  Progress: improving  Outcome Evaluation: Pt demonstrating improvements thhis date by ambulating 45 ft w/ Ruthann and using FWW. Followed closely w/ chair d/t fatigue. Pt continues to present below baseline d/t abd pain and deficits in strength, balance, and activity tolerance. Pt would continue to benefit from skilled IP PT. Recommend IRF at d/c.     Time Calculation:   PT Evaluation Complexity  History, PT Evaluation Complexity: 3 or more personal factors and/or comorbidities  Examination of Body Systems (PT Eval Complexity): total of 3 or more elements  Clinical Presentation (PT Evaluation Complexity): stable  Clinical Decision Making (PT Evaluation Complexity): low complexity  Overall Complexity (PT Evaluation Complexity): low complexity     PT Charges       Row Name 10/19/23 1104             Time Calculation    Start Time 1016  -      PT Received On 10/19/23  -      PT Goal Re-Cert Due Date 10/26/23  -         Timed Charges    70955 - PT Therapeutic Exercise Minutes 14  -LH      54900 - PT Therapeutic Activity Minutes 15  -LH         Total Minutes    Timed Charges Total Minutes 29  -       Total Minutes 29  -LH                User Key  (r) = Recorded By, (t) = Taken By, (c) = Cosigned By      Initials Name Provider Type     Diana Reis, KRISH Physical Therapist                  Therapy Charges for Today       Code Description  Service Date Service Provider Modifiers Qty    49538189635 HC PT THER PROC EA 15 MIN 10/19/2023 Diana Reis, PT GP 1    39703713115 HC PT THERAPEUTIC ACT EA 15 MIN 10/19/2023 Diana Reis, PT GP 1            PT G-Codes  Outcome Measure Options: AM-PAC 6 Clicks Basic Mobility (PT)  AM-PAC 6 Clicks Score (PT): 16  AM-PAC 6 Clicks Score (OT): 10  PT Discharge Summary  Anticipated Discharge Disposition (PT): inpatient rehabilitation facility    Diana Reis, KRISH  10/19/2023

## 2023-10-19 NOTE — PLAN OF CARE
Goal Outcome Evaluation:    A/O x4, RA, V-paced, VSS, no c/o pain or nausea. Son at bedside. IS at bedside and education reinforced. Adequate ostomy output. Turned and offloaded. TPN complete. Pt requested restart of Klonopin 0.5 mg- was taking at home for anxiety and helps rest at night. DASHAWN Matute re-ordered home dose BID PRN.     Plan of Care Reviewed With: patient        Progress: no change       Problem: Adult Inpatient Plan of Care  Goal: Plan of Care Review  Outcome: Ongoing, Progressing  Flowsheets (Taken 10/19/2023 0331)  Progress: no change  Plan of Care Reviewed With: patient  Goal: Patient-Specific Goal (Individualized)  Outcome: Ongoing, Progressing  Goal: Absence of Hospital-Acquired Illness or Injury  Outcome: Ongoing, Progressing  Intervention: Identify and Manage Fall Risk  Recent Flowsheet Documentation  Taken 10/19/2023 0215 by Marybeth Hudson, RN  Safety Promotion/Fall Prevention:   activity supervised   assistive device/personal items within reach   clutter free environment maintained   nonskid shoes/slippers when out of bed   room organization consistent   safety round/check completed  Taken 10/19/2023 0005 by Marybeth Hudson, RN  Safety Promotion/Fall Prevention:   activity supervised   assistive device/personal items within reach   clutter free environment maintained   nonskid shoes/slippers when out of bed   room organization consistent   safety round/check completed  Taken 10/18/2023 2211 by Marybeth Hudson, RN  Safety Promotion/Fall Prevention:   activity supervised   assistive device/personal items within reach   clutter free environment maintained   nonskid shoes/slippers when out of bed   safety round/check completed   room organization consistent  Taken 10/18/2023 2018 by Marybeth Hudson, RN  Safety Promotion/Fall Prevention:   activity supervised   assistive device/personal items within reach   clutter free environment maintained   nonskid shoes/slippers when out  of bed   safety round/check completed   room organization consistent  Intervention: Prevent Skin Injury  Recent Flowsheet Documentation  Taken 10/19/2023 0215 by Marybeth Hudson RN  Body Position: weight shifting  Skin Protection:   adhesive use limited   transparent dressing maintained   tubing/devices free from skin contact  Taken 10/19/2023 0005 by Marybeth Hudson RN  Body Position:   left   turned   weight shifting  Skin Protection:   adhesive use limited   transparent dressing maintained   tubing/devices free from skin contact  Taken 10/18/2023 2211 by Marybeth Hudson RN  Body Position:   turned   right   weight shifting  Skin Protection:   adhesive use limited   transparent dressing maintained   tubing/devices free from skin contact  Taken 10/18/2023 2018 by Marybeth Hudson RN  Body Position: weight shifting  Skin Protection:   adhesive use limited   transparent dressing maintained   tubing/devices free from skin contact  Intervention: Prevent and Manage VTE (Venous Thromboembolism) Risk  Recent Flowsheet Documentation  Taken 10/19/2023 0215 by Marybeth Hudson RN  Activity Management: activity encouraged  Taken 10/19/2023 0005 by Marybeth Hudson RN  Activity Management: activity encouraged  Taken 10/18/2023 2211 by Marybeth Hudson RN  Activity Management: activity encouraged  Taken 10/18/2023 2018 by Marybeth Hudson RN  Activity Management: activity encouraged  Range of Motion: active ROM (range of motion) encouraged  Intervention: Prevent Infection  Recent Flowsheet Documentation  Taken 10/18/2023 2018 by Marybeth Hudson RN  Infection Prevention:   cohorting utilized   environmental surveillance performed   equipment surfaces disinfected   hand hygiene promoted   rest/sleep promoted  Goal: Optimal Comfort and Wellbeing  Outcome: Ongoing, Progressing  Intervention: Provide Person-Centered Care  Recent Flowsheet Documentation  Taken 10/18/2023 2018 by Marybeth Hudson  RN  Trust Relationship/Rapport:   care explained   questions answered  Goal: Readiness for Transition of Care  Outcome: Ongoing, Progressing     Problem: Skin Injury Risk Increased  Goal: Skin Health and Integrity  Outcome: Ongoing, Progressing  Intervention: Optimize Skin Protection  Recent Flowsheet Documentation  Taken 10/19/2023 0215 by Marybeth Hudson RN  Pressure Reduction Techniques: frequent weight shift encouraged  Head of Bed (HOB) Positioning: HOB elevated  Pressure Reduction Devices:   pressure-redistributing mattress utilized   positioning supports utilized  Skin Protection:   adhesive use limited   transparent dressing maintained   tubing/devices free from skin contact  Taken 10/19/2023 0005 by Marybeth Hudson RN  Pressure Reduction Techniques:   frequent weight shift encouraged   weight shift assistance provided  Head of Bed (HOB) Positioning: HOB at 20-30 degrees  Pressure Reduction Devices: pressure-redistributing mattress utilized  Skin Protection:   adhesive use limited   transparent dressing maintained   tubing/devices free from skin contact  Taken 10/18/2023 2211 by Marybeth Hudson RN  Pressure Reduction Techniques:   frequent weight shift encouraged   pressure points protected   weight shift assistance provided  Head of Bed (HOB) Positioning: HOB elevated  Pressure Reduction Devices:   pressure-redistributing mattress utilized   positioning supports utilized  Skin Protection:   adhesive use limited   transparent dressing maintained   tubing/devices free from skin contact  Taken 10/18/2023 2018 by Marybeth Hudson RN  Pressure Reduction Techniques: frequent weight shift encouraged  Head of Bed (HOB) Positioning: HOB elevated  Pressure Reduction Devices: pressure-redistributing mattress utilized  Skin Protection:   adhesive use limited   transparent dressing maintained   tubing/devices free from skin contact     Problem: Pain Acute  Goal: Acceptable Pain Control and Functional  Ability  Outcome: Ongoing, Progressing  Intervention: Prevent or Manage Pain  Recent Flowsheet Documentation  Taken 10/19/2023 0215 by Marybeth Hudson RN  Medication Review/Management: medications reviewed  Taken 10/19/2023 0005 by Marybeth Hudson RN  Medication Review/Management: medications reviewed  Taken 10/18/2023 2211 by Marybeth Hudson RN  Medication Review/Management: medications reviewed  Taken 10/18/2023 2018 by Marybeth Hudson RN  Medication Review/Management: medications reviewed  Intervention: Optimize Psychosocial Wellbeing  Recent Flowsheet Documentation  Taken 10/18/2023 2018 by Marybeth Hudson RN  Supportive Measures:   active listening utilized   self-care encouraged  Diversional Activities:   television   smartphone     Problem: Fall Injury Risk  Goal: Absence of Fall and Fall-Related Injury  Outcome: Ongoing, Progressing  Intervention: Identify and Manage Contributors  Recent Flowsheet Documentation  Taken 10/19/2023 0215 by Marybeth Hudson RN  Medication Review/Management: medications reviewed  Self-Care Promotion: BADL personal objects within reach  Taken 10/19/2023 0005 by Marybeth Hudson RN  Medication Review/Management: medications reviewed  Self-Care Promotion: BADL personal objects within reach  Taken 10/18/2023 2211 by Marybeth Hudson RN  Medication Review/Management: medications reviewed  Self-Care Promotion:   BADL personal objects within reach   independence encouraged  Taken 10/18/2023 2018 by Marybeth Hudson RN  Medication Review/Management: medications reviewed  Self-Care Promotion: BADL personal objects within reach  Intervention: Promote Injury-Free Environment  Recent Flowsheet Documentation  Taken 10/19/2023 0215 by Marybeth Hudson RN  Safety Promotion/Fall Prevention:   activity supervised   assistive device/personal items within reach   clutter free environment maintained   nonskid shoes/slippers when out of bed   room organization  consistent   safety round/check completed  Taken 10/19/2023 0005 by Marybeth Hudson, RN  Safety Promotion/Fall Prevention:   activity supervised   assistive device/personal items within reach   clutter free environment maintained   nonskid shoes/slippers when out of bed   room organization consistent   safety round/check completed  Taken 10/18/2023 2211 by Marybeth Hudson, RN  Safety Promotion/Fall Prevention:   activity supervised   assistive device/personal items within reach   clutter free environment maintained   nonskid shoes/slippers when out of bed   safety round/check completed   room organization consistent  Taken 10/18/2023 2018 by Marybeth Hudson, RN  Safety Promotion/Fall Prevention:   activity supervised   assistive device/personal items within reach   clutter free environment maintained   nonskid shoes/slippers when out of bed   safety round/check completed   room organization consistent     Problem: Adjustment to Illness (Sepsis/Septic Shock)  Goal: Optimal Coping  Outcome: Ongoing, Progressing  Intervention: Optimize Psychosocial Adjustment to Illness  Recent Flowsheet Documentation  Taken 10/18/2023 2018 by Marybeth Hudson, RN  Supportive Measures:   active listening utilized   self-care encouraged  Family/Support System Care: self-care encouraged     Problem: Bleeding (Sepsis/Septic Shock)  Goal: Absence of Bleeding  Outcome: Ongoing, Progressing     Problem: Glycemic Control Impaired (Sepsis/Septic Shock)  Goal: Blood Glucose Level Within Desired Range  Outcome: Ongoing, Progressing  Intervention: Optimize Glycemic Control  Recent Flowsheet Documentation  Taken 10/18/2023 2018 by Marybeth Hudson, RN  Glycemic Management: blood glucose monitored     Problem: Infection Progression (Sepsis/Septic Shock)  Goal: Absence of Infection Signs and Symptoms  Outcome: Ongoing, Progressing  Intervention: Initiate Sepsis Management  Recent Flowsheet Documentation  Taken 10/18/2023 2018 by  Marybeth Hudson, RN  Infection Prevention:   cohorting utilized   environmental surveillance performed   equipment surfaces disinfected   hand hygiene promoted   rest/sleep promoted  Intervention: Promote Recovery  Recent Flowsheet Documentation  Taken 10/19/2023 0215 by Marybeth Hudson, RN  Activity Management: activity encouraged  Taken 10/19/2023 0005 by Marybeth Hudson, RN  Activity Management: activity encouraged  Taken 10/18/2023 2211 by Marybeth Hudson, RN  Activity Management: activity encouraged  Taken 10/18/2023 2018 by Marybeth Hudson, RN  Activity Management: activity encouraged     Problem: Nutrition Impaired (Sepsis/Septic Shock)  Goal: Optimal Nutrition Intake  Outcome: Ongoing, Progressing

## 2023-10-19 NOTE — CONSULTS
HEMATOLOGY/ONCOLOGY INPATIENT CONSULTATION      REFERRING PHYSICIAN: Carin Comer MD    PRIMARY CARE PROVIDER: Georgie Fernández DO    REASON FOR CONSULTATION: Metastatic colon cancer      HISTORY OF PRESENT ILLNESS: 72-year-old lady who presented with obstructive symptoms with a large right-sided ovarian mass.  It was initially felt that she had ovarian cancer.  Dr. Draper of GYN oncology saw the patient and and took her to the OR for possible deep bulking and diversion.  She was able to remove the right ovary which measured almost 15 cm.  She was able to divert her also.  Postoperatively she was in the ICU with hypotension and shock but since then she is done well and has improved.  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  Speaking with Dr. Draper, she felt the tumor was likely in the left upper quadrant.  Though there was disease involving the left ovary also and several other areas of disease within the pelvis and abdomen.  There was no significant carcinomatosis.      Allergies   Allergen Reactions    Paxil [Paroxetine] Anxiety       Past Medical History:   Diagnosis Date    Arthritis     CHF (congestive heart failure)     COPD (chronic obstructive pulmonary disease)     Diabetes mellitus     Hyperlipidemia     Renal disorder          Current Facility-Administered Medications:     acetaminophen (TYLENOL) tablet 650 mg, 650 mg, Oral, Q4H PRN, Ajay Steele, PharmD    Calcium Replacement - Follow Nurse / BPA Driven Protocol, , Does not apply, PRN, Dana Draper MD    cefTRIAXone (ROCEPHIN) 1000 mg/100 mL 0.9% NS (MBP), 1,000 mg, Intravenous, Q24H, Carin Comer MD, 1,000 mg at 10/19/23 0525    clonazePAM (KlonoPIN) tablet 0.5 mg, 0.5 mg, Oral, Q12H, Carin Comer MD, 0.5 mg at 10/19/23 0957    dextrose (D50W) (25 g/50 mL) IV injection 25 g, 25 g, Intravenous, Q15 Min PRN, Dana Draper MD, 25 g at 10/19/23 0534    dextrose (GLUTOSE) oral gel 15 g, 15 g,  Oral, Q15 Min PRN, Dana Draper MD    donepezil (ARICEPT) tablet 5 mg, 5 mg, Oral, Nightly, Ajay Steele, PharmD, 5 mg at 10/18/23 2013    empagliflozin (JARDIANCE) tablet 10 mg, 10 mg, Oral, Daily, Josue Olvera MD, 10 mg at 10/19/23 0806    famotidine (PEPCID) injection 20 mg, 20 mg, Intravenous, Daily, Dana Draper MD, 20 mg at 10/19/23 0806    folic acid (FOLVITE) tablet 1 mg, 1 mg, Oral, Daily, Ajay Steele, PharmD, 1 mg at 10/19/23 0805    glucagon (GLUCAGEN) injection 1 mg, 1 mg, Intramuscular, Q15 Min PRN, Dana Draper MD    heparin (porcine) 5000 UNIT/ML injection 5,000 Units, 5,000 Units, Subcutaneous, Q8H, Dana Draper MD, 5,000 Units at 10/19/23 0525    hydrALAZINE (APRESOLINE) injection 10 mg, 10 mg, Intravenous, Q6H PRN, Dana Draper MD    HYDROmorphone (DILAUDID) injection 0.25 mg, 0.25 mg, Intravenous, Q4H PRN, Elizabeth Dietz MD, 0.25 mg at 10/17/23 1408    insulin detemir (LEVEMIR) injection 5 Units, 5 Units, Subcutaneous, BID, Carin Comer MD    Insulin Lispro (humaLOG) injection 2-7 Units, 2-7 Units, Subcutaneous, 4x Daily AC & at Bedtime, Carin Comer MD, 2 Units at 10/19/23 1129    insulin regular (humuLIN R,novoLIN R) injection 3 Units, 3 Units, Subcutaneous, TID With Meals, Carin Comer MD, 3 Units at 10/19/23 1129    ipratropium-albuterol (DUO-NEB) nebulizer solution 3 mL, 3 mL, Nebulization, Q4H PRN, Dana Draper MD    ivabradine HCl (CORLANOR) tablet 7.5 mg, 7.5 mg, Oral, BID With Meals, Ajay Steele, PharmD, 7.5 mg at 10/19/23 0806    Magnesium Standard Dose Replacement - Follow Nurse / BPA Driven Protocol, , Does not apply, PRN, Dana Draper MD    metroNIDAZOLE (FLAGYL) IVPB 500 mg, 500 mg, Intravenous, Q8H, Carin Comer MD, Last Rate: 100 mL/hr at 10/19/23 0643, 500 mg at 10/19/23 0643    ondansetron (ZOFRAN) injection 4 mg, 4 mg, Intravenous, Q6H PRN, Dana Draper MD, 4 mg at 10/16/23 0644    phenol  (CHLORASEPTIC) 1.4 % liquid 1 spray, 1 spray, Mouth/Throat, Q2H PRN, Dana Draper MD, 1 spray at 10/17/23 1535    Phosphorus Replacement - Follow Nurse / BPA Driven Protocol, , Does not apply, Bonny ZENDEJAS Hope M., MD    Potassium Replacement - Follow Nurse / BPA Driven Protocol, , Does not apply, Bonny ZENDEJAS Hope M., MD    sacubitril-valsartan (ENTRESTO) 24-26 MG tablet 1 tablet, 1 tablet, Oral, BID, Ajay Steele PharmD, 1 tablet at 10/19/23 0807    sertraline (ZOLOFT) tablet 75 mg, 75 mg, Oral, Daily, Ajay Steele PharmD, 75 mg at 10/19/23 0806    [COMPLETED] Insert Peripheral IV, , , Once **AND** sodium chloride 0.9 % flush 10 mL, 10 mL, Intravenous, PRN, Dana Draper MD    sodium chloride 0.9 % flush 10 mL, 10 mL, Intravenous, Q12H, Dana Draper MD, 10 mL at 10/18/23 2013    sodium chloride 0.9 % flush 10 mL, 10 mL, Intravenous, PRN, Dana Draper MD    sodium chloride 0.9 % infusion 40 mL, 40 mL, Intravenous, PRN, Dana Draper MD    spironolactone (ALDACTONE) tablet 25 mg, 25 mg, Oral, Daily, Ajay Steele PharmD, 25 mg at 10/19/23 0805    thiamine (VITAMIN B-1) tablet 100 mg, 100 mg, Oral, Daily, Ajay Steele PharmD, 100 mg at 10/19/23 0805    Past Surgical History:   Procedure Laterality Date    CHOLECYSTECTOMY      DILATATION AND CURETTAGE      EXPLORATORY LAPAROTOMY N/A 10/15/2023    Procedure: LAPAROTOMY EXPLORATORY, RIGHT SALPINGO OOPHORECTOMY, LYSIS OF ADHESIONS, DESCENDING COLOSOTOMY;  Surgeon: Dana Draper MD;  Location: [facility];  Service: Gynecology Oncology;  Laterality: N/A;       Social History     Socioeconomic History    Marital status:    Tobacco Use    Smoking status: Former     Types: Cigarettes     Quit date:      Years since quittin.8    Smokeless tobacco: Never   Vaping Use    Vaping Use: Never used   Substance and Sexual Activity    Alcohol use: Not Currently    Drug use: Never    Sexual activity: Defer       History  reviewed. No pertinent family history.    Oncology/Hematology History    No history exists.         REVIEW OF SYSTEMS:  A 14 point review of systems was performed and is negative except as noted below.    Review of Systems   Constitutional:  Positive for appetite change, fatigue and unexpected weight change.   HENT:  Negative.     Eyes: Negative.    Respiratory: Negative.     Cardiovascular: Negative.    Gastrointestinal:  Positive for abdominal distention.   Endocrine: Negative.    Genitourinary: Negative.     Skin: Negative.    Neurological:  Positive for extremity weakness.   Hematological: Negative.    Psychiatric/Behavioral: Negative.           Objective     Vitals:    10/19/23 0523 10/19/23 0530 10/19/23 0802 10/19/23 1040   BP: 136/63  122/73 115/71   BP Location: Left arm  Left arm    Patient Position: Lying  Lying    Pulse: 97  92 100   Resp: 18  16 16   Temp: 97.4 °F (36.3 °C)  97.4 °F (36.3 °C)    TempSrc: Oral  Oral    SpO2: 97%   98%   Weight:  65 kg (143 lb 4.8 oz)     Height:                       Temp:  [97.2 °F (36.2 °C)-98.2 °F (36.8 °C)] 97.4 °F (36.3 °C)     Performance Status: 3    Physical Exam    General: Frail appearing female in no acute distress  HEENT: sclerae anicteric, oropharynx clear  Lymphatics: no cervical, supraclavicular, or axillary adenopathy  Cardiovascular: regular rate and rhythm, no murmurs  Lungs: clear to auscultation bilaterally  Abdomen: soft, nontender, nondistended.  Ostomy  Extremities: no lower extremity edema  Skin: no rashes, lesions, bruising, or petechiae      LABS:    Lab Results   Component Value Date    HGB 9.4 (L) 10/19/2023    HCT 29.4 (L) 10/19/2023    MCV 82.8 10/19/2023     10/19/2023    WBC 16.13 (H) 10/19/2023    NEUTROABS 14.52 (H) 10/19/2023    LYMPHSABS 0.50 (L) 10/16/2023    MONOSABS 1.08 (H) 10/16/2023    EOSABS 0.00 10/19/2023    BASOSABS 0.00 10/19/2023     Lab Results   Component Value Date    GLUCOSE 60 (L) 10/19/2023    BUN 22 10/19/2023     CREATININE 0.68 10/19/2023     10/19/2023    K 3.7 10/19/2023     (H) 10/19/2023    CO2 21.0 (L) 10/19/2023    CALCIUM 7.7 (L) 10/19/2023    PROTEINTOT 4.3 (L) 10/16/2023    ALBUMIN 2.1 (L) 10/16/2023    BILITOT 0.6 10/16/2023    ALKPHOS 45 10/16/2023    AST 59 (H) 10/16/2023    ALT 16 10/16/2023         IMAGING    Peripheral Block    Result Date: 10/15/2023  Clay Macdonald CRNA     10/15/2023 10:24 AM Peripheral Block Patient reassessed immediately prior to procedure Patient location during procedure: OR Reason for block: at surgeon's request and post-op pain management Performed by Anesthesiologist: Clay Bowen MD Preanesthetic Checklist Completed: patient identified, IV checked, site marked, risks and benefits discussed, surgical consent, monitors and equipment checked, pre-op evaluation and timeout performed Prep: Pt Position: supine Sterile barriers:cap, gloves, mask and washed/disinfected hands Prep: ChloraPrep Patient monitoring: blood pressure monitoring, continuous pulse oximetry and EKG Procedure Sedation: yes Performed under: general Guidance:ultrasound guided Images:still images obtained, printed/placed on chart Laterality:Bilateral Block Type:TAP Injection Technique:single-shot Needle Type:short-bevel and echogenic Needle Gauge:20 G Resistance on Injection: none Medications Used: dexamethasone sodium phosphate injection - Injection  4 mg - 10/15/2023 9:43:00 AM bupivacaine PF (MARCAINE) 0.25 % injection - Injection  55 mL - 10/15/2023 9:43:00 AM Medications Comment:Block Injection:  LA dose divided between Right and Left block Post Assessment Injection Assessment: negative aspiration for heme, incremental injection and no paresthesia on injection Patient Tolerance:comfortable throughout block Complications:no Additional Notes Mid-Axillary/Lateral TAPs A high-frequency linear transducer, with sterile cover, was placed in the midaxillary line between the ASIS and costal margin. The  "External Oblique Muscle (EOM), Internal Oblique Muscle (IOM), Transverse Abdominus Muscle (MONTENEGRO), and Peritoneum were identified. The insertion site was prepped in sterile fashion and then localized with 2-5 ml of 1% Lidocaine. Using ultrasound-guidance, a 20-gauge B-Rodríguez 4\" Ultraplex 360 non-stimulating echogenic needle was advanced in plane, from medial to lateral, until the tip of the needle was in the fascial plane between the IOM and MONTENEGRO. 1-3ml of preservative free normal saline was used to hydro-dissect the fascial planes. After the fascial plane was verified, the local anesthetic (LA) was injected. The procedure was repeated on the opposite side for bilateral coverage. Aspiration every 5 ml to prevent intravascular injection. Injection was completed with negative aspiration of blood and negative intravascular injection. Injection pressures were normal with minimal resistance. Midaxillary TAPs should reach intercostal nerves T10- T11 and the subcostal nerve T12.      XR Abdomen KUB    Result Date: 10/14/2023  XR ABDOMEN KUB Date of Exam: 10/14/2023 7:25 PM EDT Indication: New NG placement check Comparison: KUB dated 10/13/2023 Findings: There is a gastric suction tube which terminates in the proximal gastric body. There is persistent abnormal small bowel distention representing either obstruction or ileus. The transverse colon appears relatively collapsed. There are right upper quadrant  cholecystectomy clips.     Impression: 1. Gastric suction tube terminates in the proximal gastric body. 2. Abnormal gaseous distention of the small bowel likely representing small bowel obstruction or possible ileus. Electronically Signed: Prath Rai  10/14/2023 7:53 PM EDT  Workstation ID: [id number]    XR Abdomen 2+ VW with Chest 1 VW    Addendum Date: 10/14/2023    ADDENDUM #1 Right PICC tip overlies the superior vena cava. Electronically Signed: Aldair Zhou MD  10/14/2023 4:54 PM EDT  Workstation ID: [id number] ORIGINAL " REPORT: XR ABDOMEN 2+ VIEWS W CHEST 1 VW Date of Exam: 10/14/2023 2:26 PM EDT Indication: bowel obstruction Comparison: CT abdomen pelvis 10/11/2023. Findings: Nasogastric tube tip and sidehole overlie the stomach. Bowel gas pattern is similar to prior radiographs and CT with multiple loops of dilated small bowel. Lung bases are clear with pacemaker/defibrillator in place. Impression: Unchanged bowel gas pattern suggestive of persistent obstruction. Nasogastric tube tip and sidehole overlie the stomach. Electronically Signed: Aldair Zhou MD  10/14/2023 4:19 PM EDT  Workstation ID: [id number]    Result Date: 10/14/2023  XR ABDOMEN 2+ VIEWS W CHEST 1 VW Date of Exam: 10/14/2023 2:26 PM EDT Indication: bowel obstruction Comparison: CT abdomen pelvis 10/11/2023. Findings: Nasogastric tube tip and sidehole overlie the stomach. Bowel gas pattern is similar to prior radiographs and CT with multiple loops of dilated small bowel. Lung bases are clear with pacemaker/defibrillator in place.     Impression: Unchanged bowel gas pattern suggestive of persistent obstruction. Nasogastric tube tip and sidehole overlie the stomach. Electronically Signed: Aldair Zhou MD  10/14/2023 4:19 PM EDT  Workstation ID: [id number]    XR Abdomen KUB    Result Date: 10/13/2023  XR ABDOMEN KUB Date of Exam: 10/13/2023 4:32 AM EDT Indication: replacement of NG tube Comparison: 10/13/2023 at 0252 hours. Findings: Gastric suction tube is repositioned with the tip in the mid stomach. There are distended bowel loops in the upper abdomen. There is mild bibasilar atelectasis. No pneumoperitoneum.     Impression: Gastric suction tube tip is in the mid stomach. Electronically Signed: Faraz Roberts MD  10/13/2023 4:54 AM EDT  Workstation ID: [id number]    XR Abdomen KUB    Result Date: 10/13/2023  XR ABDOMEN KUB Date of Exam: 10/13/2023 3:09 AM EDT Indication: NG placement Comparison: 10/11/2023 and CT abdomen and pelvis same date. Findings: Gastric suction  tube is folded in the distal esophagus. There are distended bowel loops in the abdomen measuring up to 41 mm consistent with ongoing obstruction. No evidence of pneumoperitoneum.     Impression: Gastric suction tube is folded in the distal esophagus. Electronically Signed: Faraz Roberts MD  10/13/2023 3:50 AM EDT  Workstation ID: [id number]    US Paracentesis    Result Date: 10/12/2023  US PARACENTESIS  History: concern for ovarian malignancy, malignant ascites  : Eron Aguilar MD.  Modality: Ultrasonography                   Sedation: None. Anesthesia: Lidocaine 1% local infiltration. Estimated blood loss:  0 cc.        Technique: A thorough discussion of the risks, benefits, and alternatives of the procedure, and if applicable, moderate sedation, was carried out with the patient. They were encouraged to ask any questions. Any questions were answered. They verbalized understanding. A written informed consent was then signed.  A multi-component timeout was performed prior to starting the procedure using the departmental protocol. The procedure room personnel used personal protective equipment. The operators used sterile gloves and if indicated, sterile gowns. The surgical site was prepped with chlorhexidine gluconate  and draped in the maximal applicable sterile fashion. A preliminary ultrasonography was performed to assess the target and determine a safe access site. It showed a small pocket ascites in the right side of the abdomen and a backdrop severely distended fluid and fluid and air-filled loops of bowel in the vicinity. Pertinent ultrasound images were stored to the PACS for documentation. Discussion was carried out with the hospitalist. They indicated that the fluid was needed for diagnostic purposes. The patient understood the risks and agreed to proceed. The access site was sterilely prepped and draped. Local anesthesia was administered. A dermatotomy was performed if needed. A catheter  over the needle system was advanced into the peritoneal cavity under real-time ultrasound guidance. Straw colored fluid  was aspirated and the plastic catheter advanced into the peritoneum. The catheter was then connected to a fluid recovery system. At the end of the procedure, the catheter was withdrawn and an aseptic dressing applied. The patient tolerated the procedure  well. After uneventful recovery recovery, the patient was discharged from the department in stable condition. The total amount of fluid recovered is given below. Albumin was infused intravenously if ordered by the referring doctor. Complications: None immediate. Specimen: The specimen was labeled and sent to the lab in appropriate carriers & containers if such was requested by the ordering provider.     Impression:                                                              Successful ultrasound-guided paracentesis using a Right upper quadrant access with recovery of 0.7 liters of fluid as described above. Thank you for the opportunity to assist in the care of your patient. Electronically Signed: Eron Aguialr MD  10/12/2023 4:09 PM EDT  Workstation ID: [id number]    XR Abdomen KUB    Result Date: 10/11/2023  XR ABDOMEN KUB Date of Exam: 10/11/2023 5:58 PM CDT Indication: ng tube placement Comparison: None available. Findings: There is nasogastric tube with tip in the body of the stomach. There are dilated loops of small intestine.     Impression: There is nasogastric tube with tip in the body of the stomach Electronically Signed: Tres Bauer MD  10/11/2023 6:26 PM CDT  Workstation ID: [id number]    CT Abdomen Pelvis Without Contrast    Result Date: 10/11/2023  CT ABDOMEN PELVIS WO CONTRAST Date of Exam: 10/11/2023 2:43 PM CDT Indication: abd pain, distension, recent dx of uterine mass, Cr 2.1. Comparison: None available. Technique: Axial CT images were obtained of the abdomen and pelvis without the administration of contrast. Reconstructed  coronal and sagittal images were also obtained. Automated exposure control and iterative construction methods were used. Findings: LUNG BASES:  Unremarkable without mass or infiltrate. LIVER:  Unremarkable parenchyma without focal lesion. BILIARY/GALLBLADDER: Cholecystectomy SPLEEN:  Unremarkable PANCREAS:  Unremarkable ADRENAL:  Unremarkable KIDNEYS:  Unremarkable parenchyma with no solid mass identified. No obstruction.  No calculus identified. GASTROINTESTINAL/MESENTERY: Dilated small intestine measuring up to 4 cm in diameter with somewhat gradual tapering in the right central lower abdomen in the region of abdominopelvic mass. There is likely an element of mass effect/partial obstruction related to the mass. Distal small intestine is decompressed. There is a moderate proximal and mid fecal load. AORTA/IVC:  Normal caliber. RETROPERITONEUM/LYMPH NODES: There are enlarged retroperitoneal lymph nodes including: *2.2 x 2.0 cm left para-aortic mid abdominal node (image 64, series 2) *1.5 x 1.4 cm left para-aortic lower abdominal node (image 70, series 2) REPRODUCTIVE: There is a heterogeneous 14.9 x 11.7 cm mass within the central upper pelvis/lower abdomen. While this is adjacent to the uterine fundus it does not appear to be in direct communication and is most suggestive of an ovarian neoplasm likely arising from the right ovary. There is a heterogeneously enlarged left ovary measuring 5.0 x 4.6 cm. BLADDER:  Unremarkable OSSEUS STRUCTURES:  Typical for age with no acute process identified. There is small to moderate volume ascites. There are areas of mesenteric spider webbing/infiltration with some suggestion of nodularity worrisome for carcinomatosis. However, this is difficult to assess given lack of intravenous contrast.     Impression: 1.Large central abdominopelvic mass, likely arising from the right ovary. 2.Dilated small intestine with somewhat gradual transition in the right anterior lower abdomen near  the after mentioned mass suggestive of at least partial mass effect/obstruction. 3.Small to moderate volume ascites with imaging features concerning for carcinomatosis. 4.Enlarged retroperitoneal lymph nodes, likely metastatic. Electronically Signed: Tres Bauer MD  10/11/2023 3:09 PM CDT  Workstation ID: [id number]    XR Chest 1 View    Result Date: 10/11/2023  XR CHEST 1 VW Date of Exam: 10/11/2023 2:10 PM EDT Indication: abd pain, distension, recent dx of uterine cancer, ascites Comparison: None available. Findings: A left subclavian ICD is in place. The lungs are clear. The heart and mediastinal contours appear normal. There is no pleural effusion. The pulmonary vasculature appears normal. The osseous structures appear intact.     Impression: No acute cardiopulmonary process. Electronically Signed: Albert Rangel MD  10/11/2023 2:46 PM EDT  Workstation ID: [id number]    Remote Device Check    Result Date: 9/20/2023  This result has an attachment that is not available. Biventricular Implantable cardiac defibrillator (ICD) remote interrogation.  Device successfully sensing intrinsic cardiac events and marking appropriately.   Available impedance values demonstrate stable trend within normal limits.  Available lead capture thresholds measured without significant change.  Adequate safety margin programmed in device permanent outputs.  Battery discharge trend stable, appropriate given total time in service.  BIV pacing percentage = 92%.  EGMs reviewed against implant indication and diagnosis. It is worth noting that the vendor auto-threshold algorithm is not turned enabled or available on one or more leads.  Reported thresholds of these leads with auto-threshold disabled are from an earlier dated in-clinic evaluation. Presenting rhythm is atrial sensed with biventricular paced response.   Recent increase in respiratory rate trend.      ASSESSMENT/PLAN:    1.  Metastatic colon cancer.  Patient at this time is not a  candidate for palliative chemotherapy though she may be if she improves over the next couple of weeks.  It appears that couple of months ago she was doing reasonably well.  She is now able to eat now that obstruction has been resolved.  She lives in [facility].  I think a good plan would be for her to see Dr. Nino in [place] in about couple weeks for him to evaluate her for chemotherapy.  The family is interested in colorectal consultation for possible colonoscopy down the road.  They know of Dr. Stephens and would like to talk to him.  I will put in a consult for him.  We will check a CEA level today.  I had a long discussion with them about prognosis if she is not a candidate for further chemotherapy.  She has multiple comorbidities which makes chemotherapy very difficult.  Especially with a 5-FU regimen in a patient with significant decrease in EF.  Her baseline echo from September 2023 is 25%.  She can be discharged once she is ready from a surgical standpoint.        Tushar Damico MD    10/19/2023

## 2023-10-19 NOTE — PROGRESS NOTES
Gynecologic Oncology   Daily Progress Note    Chief Complaint: s/p ex lap, colostomy, RSO    Subjective   Patient did well overnight. Off pressors. Pain is tolerable.  She is not having nausea currently, but had a little overnight. Denies vomiting.  She it tolerating regular diet. Khan out and pt is voiding    Inpatient Medications:     Current Facility-Administered Medications:     acetaminophen (TYLENOL) tablet 650 mg, 650 mg, Oral, Q4H PRN, Ajay Steele, PharmD    Calcium Replacement - Follow Nurse / BPA Driven Protocol, , Does not apply, PRN, Dana Draper MD    cefTRIAXone (ROCEPHIN) 1000 mg/100 mL 0.9% NS (MBP), 1,000 mg, Intravenous, Q24H, Carin Comer MD, 1,000 mg at 10/19/23 0525    clonazePAM (KlonoPIN) tablet 0.5 mg, 0.5 mg, Oral, Q12H, Carin Comer MD, 0.5 mg at 10/19/23 0957    dextrose (D50W) (25 g/50 mL) IV injection 25 g, 25 g, Intravenous, Q15 Min PRN, Dana Draper MD, 25 g at 10/19/23 0534    dextrose (GLUTOSE) oral gel 15 g, 15 g, Oral, Q15 Min PRN, Dana Draper MD    donepezil (ARICEPT) tablet 5 mg, 5 mg, Oral, Nightly, Ajay Steele PharmD, 5 mg at 10/18/23 2013    empagliflozin (JARDIANCE) tablet 10 mg, 10 mg, Oral, Daily, Josue Olvera MD, 10 mg at 10/19/23 0806    famotidine (PEPCID) injection 20 mg, 20 mg, Intravenous, Daily, Dana Draper MD, 20 mg at 10/19/23 0806    folic acid (FOLVITE) tablet 1 mg, 1 mg, Oral, Daily, Ajay Steele PharmD, 1 mg at 10/19/23 0805    glucagon (GLUCAGEN) injection 1 mg, 1 mg, Intramuscular, Q15 Min PRN, Dana Draper MD    heparin (porcine) 5000 UNIT/ML injection 5,000 Units, 5,000 Units, Subcutaneous, Q8H, Dana Draper MD, 5,000 Units at 10/19/23 1451    hydrALAZINE (APRESOLINE) injection 10 mg, 10 mg, Intravenous, Q6H PRN, Dana Draper MD    HYDROmorphone (DILAUDID) injection 0.25 mg, 0.25 mg, Intravenous, Q4H PRN, Elizabeth Dietz MD, 0.25 mg at 10/17/23 1408    insulin detemir (LEVEMIR)  injection 5 Units, 5 Units, Subcutaneous, BID, Carin Comer MD    Insulin Lispro (humaLOG) injection 2-7 Units, 2-7 Units, Subcutaneous, 4x Daily AC & at Bedtime, Carin Comer MD, 2 Units at 10/19/23 1715    insulin regular (humuLIN R,novoLIN R) injection 3 Units, 3 Units, Subcutaneous, TID With Meals, Carin Comer MD, 3 Units at 10/19/23 1715    ipratropium-albuterol (DUO-NEB) nebulizer solution 3 mL, 3 mL, Nebulization, Q4H PRN, Dana Draper MD    ivabradine HCl (CORLANOR) tablet 7.5 mg, 7.5 mg, Oral, BID With Meals, Ajay Steele, PharmD, 7.5 mg at 10/19/23 1716    Magnesium Standard Dose Replacement - Follow Nurse / BPA Driven Protocol, , Does not apply, PRN, Dana Draper MD    metroNIDAZOLE (FLAGYL) IVPB 500 mg, 500 mg, Intravenous, Q8H, Carin Comer MD, Last Rate: 100 mL/hr at 10/19/23 1451, 500 mg at 10/19/23 1451    ondansetron (ZOFRAN) injection 4 mg, 4 mg, Intravenous, Q6H PRN, Dana Draper MD, 4 mg at 10/16/23 0644    phenol (CHLORASEPTIC) 1.4 % liquid 1 spray, 1 spray, Mouth/Throat, Q2H PRN, Dana Draper MD, 1 spray at 10/17/23 1535    Phosphorus Replacement - Follow Nurse / BPA Driven Protocol, , Does not apply, PRBonny PARDO Hope M., MD    Potassium Replacement - Follow Nurse / BPA Driven Protocol, , Does not apply, Bonny ZENDEJAS Hope M., MD    sacubitril-valsartan (ENTRESTO) 24-26 MG tablet 1 tablet, 1 tablet, Oral, BID, Ajay Steele, PharmD, 1 tablet at 10/19/23 0807    sertraline (ZOLOFT) tablet 75 mg, 75 mg, Oral, Daily, Ajay Steele, PharmD, 75 mg at 10/19/23 0806    [COMPLETED] Insert Peripheral IV, , , Once **AND** sodium chloride 0.9 % flush 10 mL, 10 mL, Intravenous, PRN, Dana Draper MD    sodium chloride 0.9 % flush 10 mL, 10 mL, Intravenous, Q12H, Dana Draper MD, 10 mL at 10/18/23 2013    sodium chloride 0.9 % flush 10 mL, 10 mL, Intravenous, PRN, Dana Draper MD    sodium chloride 0.9 % infusion 40 mL, 40 mL, Intravenous,  Bonny ZENDEJAS Hope M., MD    spironolactone (ALDACTONE) tablet 25 mg, 25 mg, Oral, Daily, Ajay Steele, PharmD, 25 mg at 10/19/23 0805    thiamine (VITAMIN B-1) tablet 100 mg, 100 mg, Oral, Daily, Ajay Steele, PharmD, 100 mg at 10/19/23 0805     Objective   Temp:  [97.4 °F (36.3 °C)-98.2 °F (36.8 °C)] 97.4 °F (36.3 °C)  Heart Rate:  [] 97  Resp:  [16-18] 16  BP: (109-140)/(55-76) 109/55  Vitals:    10/19/23 1455   BP: 109/55   Pulse: 97   Resp: 16   Temp:    SpO2:      I/O last 3 completed shifts:  In: 371.6 [P.O.:120; I.V.:29.8]  Out: 3675 [Urine:2200; Stool:1475]                 GENERAL: Alert, well-appearing female in no apparent distress.    CARDIOVASCULAR: Normal rate, regular rhythm, no murmurs, rubs, or gallops.    RESPIRATORY: Clear to auscultation bilaterally, normal respiratory effort  GASTROINTESTINAL:  tender throughout, distended,+ bowel sounds.  Incision(s) CDI staples in place. Colostomy bag with 100cc thin liquid stool. Ostomy site pink and viable  GENITOURINARY: Khan in place  SKIN:  Warm, dry, well-perfused.    PSYCHIATRIC: AO x3, with appropriate affect, normal thought processes  EXREMITIES: Symmetric. No peripheral edema.    Lab Results   Component Value Date    WBC 16.13 (H) 10/19/2023    HGB 9.4 (L) 10/19/2023    HCT 29.4 (L) 10/19/2023    MCV 82.8 10/19/2023     10/19/2023    NEUTROABS 14.52 (H) 10/19/2023    GLUCOSE 60 (L) 10/19/2023    BUN 22 10/19/2023    CREATININE 0.68 10/19/2023    EGFRIFNONA 34 (L) 05/02/2022    EGFRIFAFRI 41 (L) 05/02/2022     10/19/2023    K 3.7 10/19/2023     (H) 10/19/2023    CO2 21.0 (L) 10/19/2023    MG 2.0 10/19/2023    CALCIUM 7.7 (L) 10/19/2023         Assessment & Plan   Iqra Britt is a 72 y.o. female POD4 s/p ex lap, RSO, lysis of adhesions, descending colostomy on 10/15 in setting of SBO and new pelvic mass    1.  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 Patient will benefit from rehab, otherwise from a postop standpoint is OK to discharge     2. Malnutrition  -s/p NGT / TPN  -PICC in place    3.  Significant comorbidities  -FIORDALIZA in the context of CKD stage III; Cr 0.68this AM  -CHF, seen by cardiology.  Moderate risk for surgical intervention    -Type 2 diabetes mellitus  -COPD    4.   Disposition  - Postoperatively stable to discharge. Rest of care per primary team       Alexandria Puente MD  10/19/23  17:47 EDT    I saw and evaluated the patient. I agree with the findings and the plan of care as documented in the note.  Family desires PICC removal at D/C home    Dana Draper MD  10/20/23  08:13 EDT

## 2023-10-19 NOTE — CASE MANAGEMENT/SOCIAL WORK
Continued Stay Note  [facility]     Patient Name: Iqra Britt  MRN: [id number]  Today's Date: 10/19/2023    Admit Date: 10/11/2023    Plan: rehab   Discharge Plan       Row Name 10/19/23 0856       Plan    Plan rehab    Patient/Family in Agreement with Plan other (see comments)    Plan Comments CM spoke with Pau, from [facility] swing bed unit. She stated that she needs me to fax another referral, which I did. She will review referral and update me. CM will continue to follow.    1200   CM found out in MDRs this morning that the patient and her family have decided to have her be discharged home and not to rehab. Family can transport. CM notified [facility] of her decision. CM will follow.    1435   Bedside RN messaged CM to see if the patient could receive a bedside commode prior to discharge. A referral was made to [facility] and the order was placed in [employer]. CM will follow.    Final Discharge Disposition Code 61 - hospital-based swing bed                   Discharge Codes    No documentation.                 Expected Discharge Date and Time       Expected Discharge Date Expected Discharge Time    Oct 23, 2023               Francia Villareal, RN

## 2023-10-19 NOTE — DISCHARGE PLACEMENT REQUEST
"Ophelia Britt (72 y.o. Female)   To [facility] at [facility]  From [place]        Date of Birth   1950    Social Security Number       Address   [address] [place] [address]    Home Phone   [phone]    MRN   [id number]       Buddhist   Mormon    Marital Status                               Admission Date   10/11/23    Admission Type   Emergency    Admitting Provider   Carin Comer MD    Attending Provider   Carin Comer MD    Department, Room/Bed   [address], S580/1       Discharge Date       Discharge Disposition       Discharge Destination                                 Attending Provider: Carin Comer MD    Allergies: Paxil [Paroxetine]    Isolation: None   Infection: None   Code Status: CPR    Ht: 154.9 cm (61\")   Wt: 65 kg (143 lb 4.8 oz)    Admission Cmt: None   Principal Problem: SBO (small bowel obstruction) [K56.609]                   Active Insurance as of 10/11/2023       Primary Coverage       Payor Plan Insurance Group Employer/Plan Group    MEDICARE MEDICARE A & B        Payor Plan Address Payor Plan Phone Number Payor Plan Fax Number Effective Dates    [address] [phone]  12/1/2015 - None Entered    [facility] [id number]         Subscriber Name Subscriber Birth Date Member ID       OPHELIA BRITT 1950 [id number]               Secondary Coverage       Payor Plan Insurance Group Employer/Plan Group             Payor Plan Address Payor Plan Phone Number Payor Plan Fax Number Effective Dates    [address] [address] [phone]  11/10/1999 - None Entered    [facility] [address]         Subscriber Name Subscriber Birth Date Member ID       OPHLEIA BRITT 1950 [id number]                     Emergency Contacts        (Rel.) Home Phone Work Phone Mobile Phone    Chidi Britt (Son) [phone] -- [phone]    BalwinderWill (Son) [phone] -- [phone]      "         Insurance Information                  MEDICARE/MEDICARE A & B Phone: [phone]    Subscriber: Iqra Britt Subscriber#: [id number]    Group#: -- Precert#: --        / Phone: [phone]    Subscriber: Iqra Britt Subscriber#: [id number]    Group#: -- Precert#: --             History & Physical        Eron Aguilar MD at 10/12/23 1230            H&P reviewed. The patient was examined and there are no changes to the H&P.          Electronically signed by Eron Aguilar MD at 10/12/23 1230   Source Note              [facility] Medicine Services  HISTORY AND PHYSICAL    Patient Name: Iqra Britt  : 1950  MRN: [id number]  Primary Care Physician: Georgie Fernández DO  Date of admission: 10/11/2023      Subjective  Subjective   Chief Complaint:  Nausea/vomiting, abdominal distention    HPI:  Iqra Britt is a 72 y.o. female with PMHx significant for HFrEF s/p ICD (LVEF 26%), HTN, CKDIII, Arthritis, DMII, COPD who presents to [facility] with complaints of abdominal distention and nausea/vomiting, abdominal pain. Patient reports in May she developed worsening vaginal bleeding, she underwent endometrial biopsy that was negative for cancer. She continued to have on/off symptoms and underwent D&C on Monday that reportedly showed abnormal fluid in her uterus, pathology is still pending. About three weeks ago she developed progressive abdominal swelling and distention and over the course of the last few days she has developed increasing pain and nausea. Son notes significant weight loss and poor appetite over the last several months. CT in the ED with SBO and large ovarian mass w/ascites.      Personal History     Past Medical History:   Diagnosis Date    Arthritis     CHF (congestive heart failure)     COPD (chronic obstructive pulmonary disease)     Diabetes mellitus     Hyperlipidemia     Renal disorder             Past Surgical History:   Procedure Laterality Date    CHOLECYSTECTOMY      DILATATION AND CURETTAGE       Family History: family history is not on file.     Social History:  reports that she quit smoking about 35 years ago. Her smoking use included cigarettes. She has never used smokeless tobacco. She reports that she does not currently use alcohol. She reports that she does not use drugs.  Social History     Social History Narrative    Not on file     Medications:  Available home medication information reviewed.  (Not in a hospital admission)      Allergies   Allergen Reactions    Paxil [Paroxetine] Anxiety     Objective  Objective   Vital Signs:   Temp:  [97.7 °F (36.5 °C)-98.3 °F (36.8 °C)] 98.3 °F (36.8 °C)  Heart Rate:  [103-125] 109  Resp:  [20] 20  BP: (119-133)/(73-91) 119/74       Physical Exam   Constitutional: Awake, alert  Eyes: PERRLA, sclerae anicteric, no conjunctival injection  HENT: NCAT, mucous membranes moist  Neck: Supple, no thyromegaly, no lymphadenopathy, trachea midline  Respiratory: Clear to auscultation bilaterally, nonlabored respirations   Cardiovascular: RRR, no murmurs, rubs, or gallops, palpable pedal pulses bilaterally  Gastrointestinal: significantly distended abdomen with ascites present, mostly non-tender  Musculoskeletal: No bilateral ankle edema, no clubbing or cyanosis to extremities  Psychiatric: Appropriate affect, cooperative  Neurologic: Oriented x 3, strength symmetric in all extremities, Cranial Nerves grossly intact to confrontation, speech clear  Skin: No rashes      Result Review:  I have personally reviewed the results from the time of this admission to 10/11/2023 17:22 EDT and agree with these findings:  [x]  Laboratory list / accordion  [x]  Microbiology  [x]  Radiology  [x]  EKG/Telemetry   [x]  Cardiology/Vascular   [x]  Pathology  [x]  Old records  []  Other:  Most notable findings include:         LAB RESULTS:      Lab 10/11/23  1340   WBC 14.40*    HEMOGLOBIN 12.0   HEMATOCRIT 38.9   PLATELETS 585*   NEUTROS ABS 13.15*   IMMATURE GRANS (ABS) 0.13*   LYMPHS ABS 0.25*   MONOS ABS 0.84   EOS ABS 0.00   MCV 82.4   PROTIME 16.9*   INR 1.36*         Lab 10/11/23  1354 10/11/23  1347 10/11/23  1340   SODIUM  --   --  133*   POTASSIUM  --   --  4.5   CHLORIDE  --   --  91*   CO2  --   --  22.0   ANION GAP  --   --  20.0*   BUN  --   --  58*   CREATININE 2.10 2.10* 1.72*   EGFR  --   --  31.3*   GLUCOSE  --   --  358*   CALCIUM  --   --  8.5*         Lab 10/11/23  1340   TOTAL PROTEIN 7.2   ALBUMIN 3.3*   GLOBULIN 3.9   ALT (SGPT) 13   AST (SGOT) 31   BILIRUBIN 0.4   ALK PHOS 56   LIPASE 7*         Lab 10/11/23  1340   PROBNP 1,889.0*   HSTROP T 28*               UA          10/11/2023    13:44   Urinalysis   Squamous Epithelial Cells, UA 21-30    Specific Gravity, UA 1.023    Ketones, UA Trace    Blood, UA Trace    Leukocytes, UA Moderate (2+)    Nitrite, UA Negative    RBC, UA 3-6    WBC, UA Too Numerous to Count    Bacteria, UA 1+      Microbiology Results (last 10 days)       ** No results found for the last 240 hours. **          CT Abdomen Pelvis Without Contrast    Result Date: 10/11/2023  CT ABDOMEN PELVIS WO CONTRAST Date of Exam: 10/11/2023 2:43 PM CDT Indication: abd pain, distension, recent dx of uterine mass, Cr 2.1. Comparison: None available. Technique: Axial CT images were obtained of the abdomen and pelvis without the administration of contrast. Reconstructed coronal and sagittal images were also obtained. Automated exposure control and iterative construction methods were used. Findings: LUNG BASES:  Unremarkable without mass or infiltrate. LIVER:  Unremarkable parenchyma without focal lesion. BILIARY/GALLBLADDER: Cholecystectomy SPLEEN:  Unremarkable PANCREAS:  Unremarkable ADRENAL:  Unremarkable KIDNEYS:  Unremarkable parenchyma with no solid mass identified. No obstruction.  No calculus identified. GASTROINTESTINAL/MESENTERY: Dilated small  intestine measuring up to 4 cm in diameter with somewhat gradual tapering in the right central lower abdomen in the region of abdominopelvic mass. There is likely an element of mass effect/partial obstruction related to the mass. Distal small intestine is decompressed. There is a moderate proximal and mid fecal load. AORTA/IVC:  Normal caliber. RETROPERITONEUM/LYMPH NODES: There are enlarged retroperitoneal lymph nodes including: *2.2 x 2.0 cm left para-aortic mid abdominal node (image 64, series 2) *1.5 x 1.4 cm left para-aortic lower abdominal node (image 70, series 2) REPRODUCTIVE: There is a heterogeneous 14.9 x 11.7 cm mass within the central upper pelvis/lower abdomen. While this is adjacent to the uterine fundus it does not appear to be in direct communication and is most suggestive of an ovarian neoplasm likely arising from the right ovary. There is a heterogeneously enlarged left ovary measuring 5.0 x 4.6 cm. BLADDER:  Unremarkable OSSEUS STRUCTURES:  Typical for age with no acute process identified. There is small to moderate volume ascites. There are areas of mesenteric spider webbing/infiltration with some suggestion of nodularity worrisome for carcinomatosis. However, this is difficult to assess given lack of intravenous contrast.     Impression: Impression: 1.Large central abdominopelvic mass, likely arising from the right ovary. 2.Dilated small intestine with somewhat gradual transition in the right anterior lower abdomen near the after mentioned mass suggestive of at least partial mass effect/obstruction. 3.Small to moderate volume ascites with imaging features concerning for carcinomatosis. 4.Enlarged retroperitoneal lymph nodes, likely metastatic. Electronically Signed: Tres Bauer MD  10/11/2023 3:09 PM CDT  Workstation ID: [id number]    XR Chest 1 View    Result Date: 10/11/2023  XR CHEST 1 VW Date of Exam: 10/11/2023 2:10 PM EDT Indication: abd pain, distension, recent dx of uterine cancer,  ascites Comparison: None available. Findings: A left subclavian ICD is in place. The lungs are clear. The heart and mediastinal contours appear normal. There is no pleural effusion. The pulmonary vasculature appears normal. The osseous structures appear intact.     Impression: Impression: No acute cardiopulmonary process. Electronically Signed: Albert Rangel MD  10/11/2023 2:46 PM EDT  Workstation ID: [id number]         Assessment & Plan  Assessment & Plan     Active Hospital Problems    Diagnosis  POA    **Ovarian mass [N83.8]  Yes    COPD (chronic obstructive pulmonary disease) [J44.9]  Yes    Acute HFrEF (heart failure with reduced ejection fraction) [I50.21]  Yes    CKD (chronic kidney disease), stage III [N18.30]  Yes   Iqra Britt is a 72 y.o. female with PMHx significant for HFrEF/NICM s/p BiV ICD LVEF 26%), HTN, CKDIII, Arthritis, DMII, COPD who presents to [facility] with complaints of abdominal distention and nausea/vomiting, abdominal pain.    SBO:  - secondary to large ovarian mass  - Dr. Draper to evaluate, recommends placing NGT in ED  - NPO, gentle IVF for now  - PRN pain control    Large Pelvic Mass w/Ascites and concern for Carcinomatosis and metastatic LAD  - , CEA, CA-125 all significantly elevated per review of OSH records, recheck CA-125  - concern for primary Ovarian Malignancy w/Malignant Ascites  - Dr. Draper to evaluate, may need surgical debulking?   - recent negative endometrial bx 5/2023 and recent D&C Monday w/path still pending  - given her comorbidities, she is not a great surgical candidate    CKDIII  - baseline appears to be around 1.5?  - monitor closely  - avoid nephrotoxins, hold lasix while NPO    HFrEF/NICM s/p BiV ICD (LVEF 26% 9/2023)  - followed by Cardiology at   - does not appear to be on GDMT, will hold lasix for now due to NPO/SBO, need to have pharmacy clarify med rec, looks like she may have recently been started on Entresto, Ibravadine?  - monitor for  volume overload, appears euvolemic currently    Possible UTI:  - sample contaminated with significant squamous cells, will repeat and treat if indicated    COPD:  - not in exacerbation  - PRN duo-nebs    DMII:  - SSI coverage while NPO    Total time spent: 80 minutes  Time spent includes time reviewing chart, face-to-face time, counseling patient/family/caregiver, ordering medications/tests/procedures, communicating with other health care professionals, documenting clinical information in the electronic health record, and coordination of care.       DVT prophylaxis:  Heparin      CODE STATUS:  we discussed this extensively and she chooses to be full code at this time  Code Status and Medical Interventions:   Ordered at: 10/11/23 1739     Level Of Support Discussed With:    Patient     Code Status (Patient has no pulse and is not breathing):    CPR (Attempt to Resuscitate)     Medical Interventions (Patient has pulse or is breathing):    Full Support     Expected Discharge   Expected discharge date/ time has not been documented.     Judy Lujan DO  10/11/23      Electronically signed by Judy Lujan DO at 10/1950                 Judy Lujan DO at 10/11/23 172              [facility] Medicine Services  HISTORY AND PHYSICAL    Patient Name: Iqra Britt  : 1950  MRN: [id number]  Primary Care Physician: Georgie Fernández DO  Date of admission: 10/11/2023      Subjective  Subjective     Chief Complaint:  Nausea/vomiting, abdominal distention    HPI:  Iqra Britt is a 72 y.o. female with PMHx significant for HFrEF s/p ICD (LVEF 26%), HTN, CKDIII, Arthritis, DMII, COPD who presents to [facility] with complaints of abdominal distention and nausea/vomiting, abdominal pain. Patient reports in May she developed worsening vaginal bleeding, she underwent endometrial biopsy that was negative for cancer. She continued to have on/off symptoms and underwent D&C  on Monday that reportedly showed abnormal fluid in her uterus, pathology is still pending. About three weeks ago she developed progressive abdominal swelling and distention and over the course of the last few days she has developed increasing pain and nausea. Son notes significant weight loss and poor appetite over the last several months. CT in the ED with SBO and large ovarian mass w/ascites.      Personal History     Past Medical History:   Diagnosis Date    Arthritis     CHF (congestive heart failure)     COPD (chronic obstructive pulmonary disease)     Diabetes mellitus     Hyperlipidemia     Renal disorder              Past Surgical History:   Procedure Laterality Date    CHOLECYSTECTOMY      DILATATION AND CURETTAGE         Family History: family history is not on file.     Social History:  reports that she quit smoking about 35 years ago. Her smoking use included cigarettes. She has never used smokeless tobacco. She reports that she does not currently use alcohol. She reports that she does not use drugs.  Social History     Social History Narrative    Not on file       Medications:  Available home medication information reviewed.  (Not in a hospital admission)      Allergies   Allergen Reactions    Paxil [Paroxetine] Anxiety       Objective  Objective     Vital Signs:   Temp:  [97.7 °F (36.5 °C)-98.3 °F (36.8 °C)] 98.3 °F (36.8 °C)  Heart Rate:  [103-125] 109  Resp:  [20] 20  BP: (119-133)/(73-91) 119/74       Physical Exam   Constitutional: Awake, alert  Eyes: PERRLA, sclerae anicteric, no conjunctival injection  HENT: NCAT, mucous membranes moist  Neck: Supple, no thyromegaly, no lymphadenopathy, trachea midline  Respiratory: Clear to auscultation bilaterally, nonlabored respirations   Cardiovascular: RRR, no murmurs, rubs, or gallops, palpable pedal pulses bilaterally  Gastrointestinal: significantly distended abdomen with ascites present, mostly non-tender  Musculoskeletal: No bilateral ankle edema, no  clubbing or cyanosis to extremities  Psychiatric: Appropriate affect, cooperative  Neurologic: Oriented x 3, strength symmetric in all extremities, Cranial Nerves grossly intact to confrontation, speech clear  Skin: No rashes      Result Review:  I have personally reviewed the results from the time of this admission to 10/11/2023 17:22 EDT and agree with these findings:  [x]  Laboratory list / accordion  [x]  Microbiology  [x]  Radiology  [x]  EKG/Telemetry   [x]  Cardiology/Vascular   [x]  Pathology  [x]  Old records  []  Other:  Most notable findings include:         LAB RESULTS:      Lab 10/11/23  1340   WBC 14.40*   HEMOGLOBIN 12.0   HEMATOCRIT 38.9   PLATELETS 585*   NEUTROS ABS 13.15*   IMMATURE GRANS (ABS) 0.13*   LYMPHS ABS 0.25*   MONOS ABS 0.84   EOS ABS 0.00   MCV 82.4   PROTIME 16.9*   INR 1.36*         Lab 10/11/23  1354 10/11/23  1347 10/11/23  1340   SODIUM  --   --  133*   POTASSIUM  --   --  4.5   CHLORIDE  --   --  91*   CO2  --   --  22.0   ANION GAP  --   --  20.0*   BUN  --   --  58*   CREATININE 2.10 2.10* 1.72*   EGFR  --   --  31.3*   GLUCOSE  --   --  358*   CALCIUM  --   --  8.5*         Lab 10/11/23  1340   TOTAL PROTEIN 7.2   ALBUMIN 3.3*   GLOBULIN 3.9   ALT (SGPT) 13   AST (SGOT) 31   BILIRUBIN 0.4   ALK PHOS 56   LIPASE 7*         Lab 10/11/23  1340   PROBNP 1,889.0*   HSTROP T 28*                 UA          10/11/2023    13:44   Urinalysis   Squamous Epithelial Cells, UA 21-30    Specific Gravity, UA 1.023    Ketones, UA Trace    Blood, UA Trace    Leukocytes, UA Moderate (2+)    Nitrite, UA Negative    RBC, UA 3-6    WBC, UA Too Numerous to Count    Bacteria, UA 1+        Microbiology Results (last 10 days)       ** No results found for the last 240 hours. **            CT Abdomen Pelvis Without Contrast    Result Date: 10/11/2023  CT ABDOMEN PELVIS WO CONTRAST Date of Exam: 10/11/2023 2:43 PM CDT Indication: abd pain, distension, recent dx of uterine mass, Cr 2.1. Comparison: None  available. Technique: Axial CT images were obtained of the abdomen and pelvis without the administration of contrast. Reconstructed coronal and sagittal images were also obtained. Automated exposure control and iterative construction methods were used. Findings: LUNG BASES:  Unremarkable without mass or infiltrate. LIVER:  Unremarkable parenchyma without focal lesion. BILIARY/GALLBLADDER: Cholecystectomy SPLEEN:  Unremarkable PANCREAS:  Unremarkable ADRENAL:  Unremarkable KIDNEYS:  Unremarkable parenchyma with no solid mass identified. No obstruction.  No calculus identified. GASTROINTESTINAL/MESENTERY: Dilated small intestine measuring up to 4 cm in diameter with somewhat gradual tapering in the right central lower abdomen in the region of abdominopelvic mass. There is likely an element of mass effect/partial obstruction related to the mass. Distal small intestine is decompressed. There is a moderate proximal and mid fecal load. AORTA/IVC:  Normal caliber. RETROPERITONEUM/LYMPH NODES: There are enlarged retroperitoneal lymph nodes including: *2.2 x 2.0 cm left para-aortic mid abdominal node (image 64, series 2) *1.5 x 1.4 cm left para-aortic lower abdominal node (image 70, series 2) REPRODUCTIVE: There is a heterogeneous 14.9 x 11.7 cm mass within the central upper pelvis/lower abdomen. While this is adjacent to the uterine fundus it does not appear to be in direct communication and is most suggestive of an ovarian neoplasm likely arising from the right ovary. There is a heterogeneously enlarged left ovary measuring 5.0 x 4.6 cm. BLADDER:  Unremarkable OSSEUS STRUCTURES:  Typical for age with no acute process identified. There is small to moderate volume ascites. There are areas of mesenteric spider webbing/infiltration with some suggestion of nodularity worrisome for carcinomatosis. However, this is difficult to assess given lack of intravenous contrast.     Impression: Impression: 1.Large central abdominopelvic  mass, likely arising from the right ovary. 2.Dilated small intestine with somewhat gradual transition in the right anterior lower abdomen near the after mentioned mass suggestive of at least partial mass effect/obstruction. 3.Small to moderate volume ascites with imaging features concerning for carcinomatosis. 4.Enlarged retroperitoneal lymph nodes, likely metastatic. Electronically Signed: Tres Bauer MD  10/11/2023 3:09 PM CDT  Workstation ID: [id number]    XR Chest 1 View    Result Date: 10/11/2023  XR CHEST 1 VW Date of Exam: 10/11/2023 2:10 PM EDT Indication: abd pain, distension, recent dx of uterine cancer, ascites Comparison: None available. Findings: A left subclavian ICD is in place. The lungs are clear. The heart and mediastinal contours appear normal. There is no pleural effusion. The pulmonary vasculature appears normal. The osseous structures appear intact.     Impression: Impression: No acute cardiopulmonary process. Electronically Signed: Albert Rangel MD  10/11/2023 2:46 PM EDT  Workstation ID: [id number]         Assessment & Plan  Assessment & Plan     Active Hospital Problems    Diagnosis  POA    **Ovarian mass [N83.8]  Yes    COPD (chronic obstructive pulmonary disease) [J44.9]  Yes    Acute HFrEF (heart failure with reduced ejection fraction) [I50.21]  Yes    CKD (chronic kidney disease), stage III [N18.30]  Yes     Iqra Britt is a 72 y.o. female with PMHx significant for HFrEF/NICM s/p BiV ICD LVEF 26%), HTN, CKDIII, Arthritis, DMII, COPD who presents to [facility] with complaints of abdominal distention and nausea/vomiting, abdominal pain.    SBO:  - secondary to large ovarian mass  - Dr. Draper to evaluate, recommends placing NGT in ED  - NPO, gentle IVF for now  - PRN pain control    Large Pelvic Mass w/Ascites and concern for Carcinomatosis and metastatic LAD  - , CEA, CA-125 all significantly elevated per review of OSH records, recheck CA-125  - concern for primary Ovarian  Malignancy w/Malignant Ascites  - Dr. Draper to evaluate, may need surgical debulking?   - recent negative endometrial bx 5/2023 and recent D&C Monday w/path still pending  - given her comorbidities, she is not a great surgical candidate    CKDIII  - baseline appears to be around 1.5?  - monitor closely  - avoid nephrotoxins, hold lasix while NPO    HFrEF/NICM s/p BiV ICD (LVEF 26% 9/2023)  - followed by Cardiology at   - does not appear to be on GDMT, will hold lasix for now due to NPO/SBO, need to have pharmacy clarify med rec, looks like she may have recently been started on Entresto, Ibravadine?  - monitor for volume overload, appears euvolemic currently    Possible UTI:  - sample contaminated with significant squamous cells, will repeat and treat if indicated    COPD:  - not in exacerbation  - PRN duo-nebs    DMII:  - SSI coverage while NPO    Total time spent: 80 minutes  Time spent includes time reviewing chart, face-to-face time, counseling patient/family/caregiver, ordering medications/tests/procedures, communicating with other health care professionals, documenting clinical information in the electronic health record, and coordination of care.       DVT prophylaxis:  Heparin      CODE STATUS:  we discussed this extensively and she chooses to be full code at this time  Code Status and Medical Interventions:   Ordered at: 10/11/23 9330     Level Of Support Discussed With:    Patient     Code Status (Patient has no pulse and is not breathing):    CPR (Attempt to Resuscitate)     Medical Interventions (Patient has pulse or is breathing):    Full Support       Expected Discharge   Expected discharge date/ time has not been documented.     Judy Lujan DO  10/11/23      Electronically signed by Judy Lujan DO at 10/1950          Physician Progress Notes (most recent note)        Josue Olvera MD at 10/19/23 0701          [facility]  IP Progress  Note      Chief Complaint/Reason for service: #1 LV systolic dysfunction    Subjective   Subjective: Patient is awake and alert.  She states she feels better.  She acknowledges that she wheezes some at home.  She denies chest pain.  She has never been on Jardiance    Past medical, surgical, social and family history reviewed in the patient's electronic medical record.    Objective     Vital Sign Min/Max for last 24 hours  Temp  Min: 97.2 °F (36.2 °C)  Max: 98.2 °F (36.8 °C)   BP  Min: 130/70  Max: 140/76   Pulse  Min: 88  Max: 97   Resp  Min: 16  Max: 18   SpO2  Min: 97 %  Max: 99 %   No data recorded      Intake/Output Summary (Last 24 hours) at 10/19/2023 0701  Last data filed at 10/19/2023 0530  Gross per 24 hour   Intake 120 ml   Output 3025 ml   Net -2905 ml             Current Facility-Administered Medications:     acetaminophen (TYLENOL) tablet 650 mg, 650 mg, Oral, Q4H PRN, Ajay Steele, PharmD    Calcium Replacement - Follow Nurse / BPA Driven Protocol, , Does not apply, PRN, Dana Draper MD    cefTRIAXone (ROCEPHIN) 1000 mg/100 mL 0.9% NS (MBP), 1,000 mg, Intravenous, Q24H, Carin Comer MD, 1,000 mg at 10/19/23 0525    clonazePAM (KlonoPIN) tablet 0.5 mg, 0.5 mg, Oral, BID PRN, Tiffany Cristina APRN, 0.5 mg at 10/18/23 2203    dextrose (D50W) (25 g/50 mL) IV injection 25 g, 25 g, Intravenous, Q15 Min PRN, Dana Draper MD, 25 g at 10/19/23 0534    dextrose (GLUTOSE) oral gel 15 g, 15 g, Oral, Q15 Min PRN, Dana Draper MD    donepezil (ARICEPT) tablet 5 mg, 5 mg, Oral, Nightly, Ajay Steele, PharmD, 5 mg at 10/18/23 2013    famotidine (PEPCID) injection 20 mg, 20 mg, Intravenous, Daily, Dana Draper MD, 20 mg at 10/18/23 0819    folic acid (FOLVITE) tablet 1 mg, 1 mg, Oral, Daily, Ajay Steele, PharmD    glucagon (GLUCAGEN) injection 1 mg, 1 mg, Intramuscular, Q15 Min PRN, Dana Draper MD    heparin (porcine) 5000 UNIT/ML injection 5,000 Units, 5,000 Units,  Subcutaneous, Q8H, Dana Draper MD, 5,000 Units at 10/19/23 0525    hydrALAZINE (APRESOLINE) injection 10 mg, 10 mg, Intravenous, Q6H PRN, Dana Draper MD    HYDROmorphone (DILAUDID) injection 0.25 mg, 0.25 mg, Intravenous, Q4H PRN, Elizabeth Dietz MD, 0.25 mg at 10/17/23 1408    insulin detemir (LEVEMIR) injection 10 Units, 10 Units, Subcutaneous, BID, Case, Regla V., DO, 10 Units at 10/18/23 2012    insulin regular (humuLIN R,novoLIN R) injection 3-14 Units, 3-14 Units, Subcutaneous, Q6H, Dana Draper MD, 8 Units at 10/18/23 1729    insulin regular (humuLIN R,novoLIN R) injection 5 Units, 5 Units, Subcutaneous, Q6H, Case, Regla V., DO, 5 Units at 10/19/23 0003    ipratropium-albuterol (DUO-NEB) nebulizer solution 3 mL, 3 mL, Nebulization, Q4H PRN, Dana Draper MD    ivabradine HCl (CORLANOR) tablet 7.5 mg, 7.5 mg, Oral, BID With Meals, Ajay Steele, PharmD, 7.5 mg at 10/18/23 1729    Magnesium Standard Dose Replacement - Follow Nurse / BPA Driven Protocol, , Does not apply, PRN, Dana Draper MD    metroNIDAZOLE (FLAGYL) IVPB 500 mg, 500 mg, Intravenous, Q8H, Carin Comer MD, Last Rate: 100 mL/hr at 10/19/23 0643, 500 mg at 10/19/23 0643    ondansetron (ZOFRAN) injection 4 mg, 4 mg, Intravenous, Q6H PRN, Dana Draper MD, 4 mg at 10/16/23 0644    phenol (CHLORASEPTIC) 1.4 % liquid 1 spray, 1 spray, Mouth/Throat, Q2H PRN, Dana Draper MD, 1 spray at 10/17/23 1535    Phosphorus Replacement - Follow Nurse / BPA Driven Protocol, , Does not apply, Bonny ZENDEJAS Hope M., MD    Potassium Replacement - Follow Nurse / BPA Driven Protocol, , Does not apply, Bonny ZENDEJAS Hope M., MD    sacubitril-valsartan (ENTRESTO) 24-26 MG tablet 1 tablet, 1 tablet, Oral, BID, Ajay Steele, PharmD, 1 tablet at 10/18/23 2204    sertraline (ZOLOFT) tablet 75 mg, 75 mg, Oral, Daily, Ajay Steele, PharmD    [COMPLETED] Insert Peripheral IV, , , Once **AND** sodium chloride 0.9  % flush 10 mL, 10 mL, Intravenous, PRN, Dana Draper MD    sodium chloride 0.9 % flush 10 mL, 10 mL, Intravenous, Q12H, Dana Draper MD, 10 mL at 10/18/23 2013    sodium chloride 0.9 % flush 10 mL, 10 mL, Intravenous, PRN, Dana Draper MD    sodium chloride 0.9 % infusion 40 mL, 40 mL, Intravenous, PRN, Dana Draper MD    spironolactone (ALDACTONE) tablet 25 mg, 25 mg, Oral, Daily, Ajay Steele, PharmESTER    thiamine (VITAMIN B-1) tablet 100 mg, 100 mg, Oral, Daily, Ajay Steele, PharmD    Physical Exam: General pleasant short female in bed 75 degree angle with minimal resting tachypnea no overt dyspnea        HEENT: No overt JVP.  No icterus       Respiratory: Equal bilateral symmetrical expansion with expiratory wheezes       Cardiovascular: Regular rate and rhythm with no gallop or murmur and 1+ edema to palpation       GI: Distended       Lower Extremities: No lesions       Neuro: Facial expressions symmetrical moves all 4 extremities       Skin: Warm and dry with pitting edema to palpation       Psych: Pleasant affect    Results Review: Overnight output exceeds intake by 2.7 L.  She is still a net +2.9 L.  Hemoglobin 9.4.  White blood count remains elevated at 16,000.  Potassium 3.7 hemoglobin 9.4 GFR 92.7    Radiology Results:  Imaging Results (Last 72 Hours)       ** No results found for the last 72 hours. **            EKG: Sinus rhythm    ECHO: Last known EF 2628%    Tele: Sinus rhythm    Assessment   Assessment/Plan: This patient has LV systolic dysfunction-she was started on Entresto yesterday and is tolerating it nicely.  She does have some resting tachypnea today and she is wheezing.  I will give her a dose of Lasix 40 mg IV x1.  We will start Jardiance 10 mg daily.  We will titrate Entresto over the next couple of days    Josue Olvera MD  10/19/23  07:01 EDT      Electronically signed by Josue Olvera MD at 10/19/23 0768          Physical Therapy Notes (most  recent note)        Saundra Benitez, PT at 10/17/23 0940  Version 1 of 1         Patient Name: Iqra Britt  : 1950    MRN: [id number]                              Today's Date: 10/17/2023       Admit Date: 10/11/2023    Visit Dx:     ICD-10-CM ICD-9-CM   1. Pelvic mass  R19.00 789.30   2. Partial intestinal obstruction, unspecified cause  K56.600 560.9   3. Nausea and vomiting, unspecified vomiting type  R11.2 787.01   4. Other ascites  R18.8 789.59   5. Hyperglycemia  R73.9 790.29   6. Renal insufficiency  N28.9 593.9   7. SBO (small bowel obstruction)  K56.609 560.9     Patient Active Problem List   Diagnosis    Ovarian mass    COPD (chronic obstructive pulmonary disease)    Chronic HFrEF (heart failure with reduced ejection fraction)    CKD (chronic kidney disease), stage III    Type 2 diabetes mellitus    SBO (small bowel obstruction)     Past Medical History:   Diagnosis Date    Arthritis     CHF (congestive heart failure)     COPD (chronic obstructive pulmonary disease)     Diabetes mellitus     Hyperlipidemia     Renal disorder      Past Surgical History:   Procedure Laterality Date    CHOLECYSTECTOMY      DILATATION AND CURETTAGE      EXPLORATORY LAPAROTOMY N/A 10/15/2023    Procedure: LAPAROTOMY EXPLORATORY, RIGHT SALPINGO OOPHORECTOMY, LYSIS OF ADHESIONS, DESCENDING COLOSOTOMY;  Surgeon: Dana Draper MD;  Location: [facility];  Service: Gynecology Oncology;  Laterality: N/A;      General Information       Row Name 10/17/23 1118          Physical Therapy Time and Intention    Document Type therapy note (daily note)  -AY     Mode of Treatment physical therapy  -AY       Row Name 10/17/23 1118          General Information    Patient Profile Reviewed yes  -AY     Existing Precautions/Restrictions fall;other (see comments);oxygen therapy device and L/min  NG, abd incision w/ binder, colostomy  -AY     Barriers to Rehab medically complex  -AY       Row Name 10/17/23 1118          Cognition     Orientation Status (Cognition) oriented x 3  -AY       Row Name 10/17/23 1118          Safety Issues, Functional Mobility    Safety Issues Affecting Function (Mobility) insight into deficits/self-awareness;sequencing abilities  -AY     Impairments Affecting Function (Mobility) balance;endurance/activity tolerance;shortness of breath;strength;pain  -AY               User Key  (r) = Recorded By, (t) = Taken By, (c) = Cosigned By      Initials Name Provider Type    Saundra Georges PT Physical Therapist                   Mobility       Row Name 10/17/23 1119          Sit-Stand Transfer    Sit-Stand [place] (Transfers) 1 person assist;minimum assist (75% patient effort);verbal cues  -AY     Assistive Device (Sit-Stand Transfers) walker, front-wheeled  -AY     Comment, (Sit-Stand Transfer) performed x3 reps.  -AY       Row Name 10/17/23 1119          Gait/Stairs (Locomotion)    [place] Level (Gait) minimum assist (75% patient effort);1 person assist;verbal cues;nonverbal cues (demo/gesture)  +chair follow  -AY     Assistive Device (Gait) walker, front-wheeled  -AY     Distance in Feet (Gait) 10+20  -AY     Deviations/Abnormal Patterns (Gait) bilateral deviations;base of support, narrow;bhaavna decreased;gait speed decreased;stride length decreased  -AY     Bilateral Gait Deviations heel strike decreased;forward flexed posture  -AY     Comment, (Gait/Stairs) decreased stride length noted with shuffling gait pattern. one seated rest break required. close chair follow required d/t decreased endurance and increased incisional pain.  -AY               User Key  (r) = Recorded By, (t) = Taken By, (c) = Cosigned By      Initials Name Provider Type    Saundra Georges PT Physical Therapist                   Obj/Interventions       Row Name 10/17/23 1120          Motor Skills    Therapeutic Exercise knee;ankle;hip  -AY       Row Name 10/17/23 1120          Hip (Therapeutic Exercise)    Hip (Therapeutic Exercise)  strengthening exercise  -AY     Hip Strengthening (Therapeutic Exercise) bilateral;10 repetitions;sitting;marching while seated  -AY       Row Name 10/17/23 1120          Knee (Therapeutic Exercise)    Knee (Therapeutic Exercise) strengthening exercise  -AY     Knee Strengthening (Therapeutic Exercise) bilateral;LAQ (long arc quad);10 repetitions;sitting  -AY       Row Name 10/17/23 1120          Ankle (Therapeutic Exercise)    Ankle (Therapeutic Exercise) AROM (active range of motion)  -AY     Ankle AROM (Therapeutic Exercise) bilateral;dorsiflexion;plantarflexion;10 repetitions;sitting  -AY       Row Name 10/17/23 1120          Balance    Balance Assessment sitting static balance;sitting dynamic balance;sit to stand dynamic balance;standing static balance;standing dynamic balance  -AY     Static Sitting Balance contact guard  -AY     Dynamic Sitting Balance contact guard  -AY     Position, Sitting Balance unsupported;sitting in chair  -AY     Sit to Stand Dynamic Balance minimal assist  -AY     Static Standing Balance minimal assist  -AY     Dynamic Standing Balance minimal assist  -AY     Position/Device Used, Standing Balance supported;walker, rolling  -AY               User Key  (r) = Recorded By, (t) = Taken By, (c) = Cosigned By      Initials Name Provider Type    AY Saundra Benitez, PT Physical Therapist                   Goals/Plan    No documentation.                  Clinical Impression       Row Name 10/17/23 1121          Pain    Pretreatment Pain Rating 3/10  -AY     Posttreatment Pain Rating 5/10  -AY     Pain Location incisional  -AY     Pain Location - abdomen  -AY     Pain Intervention(s) Repositioned;Ambulation/increased activity  -AY     Additional Documentation Pain Scale: Numbers Pre/Post-Treatment (Group)  -AY       Row Name 10/17/23 1121          Plan of Care Review    Plan of Care Reviewed With patient  -AY     Progress improving  -AY     Outcome Evaluation Pt showing improvement as she  increased ambulation distance 10ft + 20ft with RWx and min A, limited by incisional pain and generalized weakness compared to baseline. d/c rec for IRF.  -AY       Row Name 10/17/23 1121          Vital Signs    Pre Systolic BP Rehab 120  -AY     Pre Treatment Diastolic BP 56  -AY     Post Systolic BP Rehab 122  -AY     Post Treatment Diastolic BP 59  -AY     Pretreatment Heart Rate (beats/min) 101  -AY     Posttreatment Heart Rate (beats/min) 105  -AY     Pre SpO2 (%) 95  -AY     O2 Delivery Pre Treatment nasal cannula  -AY     O2 Delivery Intra Treatment nasal cannula  -AY     Post SpO2 (%) 96  -AY     O2 Delivery Post Treatment nasal cannula  -AY     Pre Patient Position Sitting  -AY     Intra Patient Position Standing  -AY     Post Patient Position Sitting  -AY       Row Name 10/17/23 1121          Positioning and Restraints    Pre-Treatment Position sitting in chair/recliner  -AY     Post Treatment Position chair  -AY     In Chair notified nsg;reclined;sitting;call light within reach;encouraged to call for assist;exit alarm on;legs elevated;LUE elevated;RUE elevated;waffle cushion;on mechanical lift sling  -AY               User Key  (r) = Recorded By, (t) = Taken By, (c) = Cosigned By      Initials Name Provider Type    AY Saundra Benitez, PT Physical Therapist                   Outcome Measures       Row Name 10/17/23 1122          How much help from another person do you currently need...    Turning from your back to your side while in flat bed without using bedrails? 2  -AY     Moving from lying on back to sitting on the side of a flat bed without bedrails? 2  -AY     Moving to and from a bed to a chair (including a wheelchair)? 2  -AY     Standing up from a chair using your arms (e.g., wheelchair, bedside chair)? 3  -AY     Climbing 3-5 steps with a railing? 2  -AY     To walk in hospital room? 3  -AY     AM-PAC 6 Clicks Score (PT) 14  -AY     Highest level of mobility 4 --> Transferred to chair/commode  -AY        Row Name 10/17/23 1122 10/17/23 0903       Functional Assessment    Outcome Measure Options AM-PAC 6 Clicks Basic Mobility (PT)  -AY AM-PAC 6 Clicks Daily Activity (OT)  -CS              User Key  (r) = Recorded By, (t) = Taken By, (c) = Cosigned By      Initials Name Provider Type    CS Laurita Pandey, OT Occupational Therapist    Saundra Georges, PT Physical Therapist                                 Physical Therapy Education       Title: PT OT SLP Therapies (In Progress)       Topic: Physical Therapy (In Progress)       Point: Mobility training (In Progress)       Learning Progress Summary             Patient Acceptance, E,TB, NR by  at 10/17/2023 1122    Acceptance, E,TB, NR by  at 10/16/2023 1457    Acceptance, TB,E, VU,DU by  at 10/16/2023 1227    Acceptance, E, VU,NR by  at 10/12/2023 1357   Family Acceptance, E,TB, NR by  at 10/16/2023 1457    Acceptance, TB,E, VU,DU by  at 10/16/2023 1227    Acceptance, E, VU,NR by  at 10/12/2023 1357                         Point: Home exercise program (In Progress)       Learning Progress Summary             Patient Acceptance, E,TB, NR by AY at 10/17/2023 1122    Acceptance, TB,E, VU,DU by  at 10/16/2023 1227   Family Acceptance, TB,E, VU,DU by  at 10/16/2023 1227                         Point: Body mechanics (In Progress)       Learning Progress Summary             Patient Acceptance, E,TB, NR by AY at 10/17/2023 1122    Acceptance, E,TB, NR by  at 10/16/2023 1457    Acceptance, TB,E, VU,DU by  at 10/16/2023 1227    Acceptance, E, VU,NR by  at 10/12/2023 1357   Family Acceptance, E,TB, NR by  at 10/16/2023 1457    Acceptance, TB,E, VU,DU by  at 10/16/2023 1227    Acceptance, E, VU,NR by  at 10/12/2023 1357                         Point: Precautions (In Progress)       Learning Progress Summary             Patient Acceptance, E,TB, NR by  at 10/17/2023 1122    Acceptance, E,TB, NR by AY at 10/16/2023 1457    Acceptance, TB,E, VU,DU  by  at 10/16/2023 1227    Acceptance, E, VU,NR by  at 10/12/2023 1357   Family Acceptance, E,TB, NR by  at 10/16/2023 1457    Acceptance, TB,E, VU,DU by  at 10/16/2023 1227    Acceptance, E, VU,NR by  at 10/12/2023 1357                                         User Key       Initials Effective Dates Name Provider Type Discipline     06/16/21 -  Marybeth Donovan RN Registered Nurse Nurse     11/10/20 -  Saundra Benitez, PT Physical Therapist PT     09/21/23 -  Diana Reis PT Physical Therapist PT                  PT Recommendation and Plan  Planned Therapy Interventions (PT): balance training, bed mobility training, home exercise program, gait training, transfer training, postural re-education, patient/family education, strengthening, neuromuscular re-education  Plan of Care Reviewed With: patient  Progress: improving  Outcome Evaluation: Pt showing improvement as she increased ambulation distance 10ft + 20ft with RWx and min A, limited by incisional pain and generalized weakness compared to baseline. d/c rec for IRF.     Time Calculation:         PT Charges       Row Name 10/17/23 1123             Time Calculation    Start Time 0940  -AY      PT Received On 10/17/23  -AY         Timed Charges    74355 - PT Therapeutic Exercise Minutes 10  -AY      69628 - PT Therapeutic Activity Minutes 14  -AY         Total Minutes    Timed Charges Total Minutes 24  -AY       Total Minutes 24  -AY                User Key  (r) = Recorded By, (t) = Taken By, (c) = Cosigned By      Initials Name Provider Type     Saundra Benitez, PT Physical Therapist                  Therapy Charges for Today       Code Description Service Date Service Provider Modifiers Qty    81193800201 HC PT THERAPEUTIC ACT EA 15 MIN 10/16/2023 Saundra Benitez, PT GP 2    96258164555 HC PT RE-EVAL ESTABLISHED PLAN 2 10/16/2023 Saundra Benitez, PT GP 1    19916426937 HC PT THER PROC EA 15 MIN 10/17/2023 Saundra Benitez, PT GP 1    81824149137 HC PT  THERAPEUTIC ACT EA 15 MIN 10/17/2023 Saundra Benitez, PT GP 1            PT G-Codes  Outcome Measure Options: AM-PAC 6 Clicks Basic Mobility (PT)  AM-PAC 6 Clicks Score (PT): 14  AM-PAC 6 Clicks Score (OT): 10  PT Discharge Summary  Anticipated Discharge Disposition (PT): inpatient rehabilitation facility    Saundra Benitez PT  10/17/2023      Electronically signed by Saundra Benitez, PT at 10/17/23 1123          Occupational Therapy Notes (most recent note)        Laurita Pandey, OT at 10/17/23 0803          Patient Name: Iqra Britt  : 1950    MRN: [id number]                              Today's Date: 10/17/2023       Admit Date: 10/11/2023    Visit Dx:     ICD-10-CM ICD-9-CM   1. Pelvic mass  R19.00 789.30   2. Partial intestinal obstruction, unspecified cause  K56.600 560.9   3. Nausea and vomiting, unspecified vomiting type  R11.2 787.01   4. Other ascites  R18.8 789.59   5. Hyperglycemia  R73.9 790.29   6. Renal insufficiency  N28.9 593.9   7. SBO (small bowel obstruction)  K56.609 560.9     Patient Active Problem List   Diagnosis    Ovarian mass    COPD (chronic obstructive pulmonary disease)    Chronic HFrEF (heart failure with reduced ejection fraction)    CKD (chronic kidney disease), stage III    Type 2 diabetes mellitus    SBO (small bowel obstruction)     Past Medical History:   Diagnosis Date    Arthritis     CHF (congestive heart failure)     COPD (chronic obstructive pulmonary disease)     Diabetes mellitus     Hyperlipidemia     Renal disorder      Past Surgical History:   Procedure Laterality Date    CHOLECYSTECTOMY      DILATATION AND CURETTAGE      EXPLORATORY LAPAROTOMY N/A 10/15/2023    Procedure: LAPAROTOMY EXPLORATORY, RIGHT SALPINGO OOPHORECTOMY, LYSIS OF ADHESIONS, DESCENDING COLOSOTOMY;  Surgeon: Dana Draper MD;  Location: [facility];  Service: Gynecology Oncology;  Laterality: N/A;      General Information       Row Name 10/17/23 0856          OT Time and Intention     Document Type re-evaluation  -CS     Mode of Treatment occupational therapy  -CS       Row Name 10/17/23 0856          General Information    Patient Profile Reviewed yes  -CS     Prior Level of Function --  see IE  -CS     Existing Precautions/Restrictions fall;other (see comments);oxygen therapy device and L/min  NG, abd incision w/ binder, colostomy  -CS     Barriers to Rehab medically complex  -CS       Row Name 10/17/23 0856          Cognition    Orientation Status (Cognition) oriented x 3  -CS       Row Name 10/17/23 0856          Safety Issues, Functional Mobility    Impairments Affecting Function (Mobility) balance;endurance/activity tolerance;shortness of breath;strength;pain  -CS               User Key  (r) = Recorded By, (t) = Taken By, (c) = Cosigned By      Initials Name Provider Type    CS Laurita Pandey OT Occupational Therapist                     Mobility/ADL's       Row Name 10/17/23 0857          Bed Mobility    Bed Mobility rolling right;sidelying-sit  -CS     Rolling Right [place] (Bed Mobility) minimum assist (75% patient effort);verbal cues  -CS     Sidelying-Sit [place] (Bed Mobility) moderate assist (50% patient effort);verbal cues  -CS     Assistive Device (Bed Mobility) bed rails;draw sheet  -CS     Comment, (Bed Mobility) cueing for log-roll technique  -CS       Row Name 10/17/23 0857          Transfers    Transfers bed-chair transfer;sit-stand transfer;stand-sit transfer  -CS     Comment, (Transfers) BUE support w/ noted mild B knee buckling and posterior lean  -CS       Row Name 10/17/23 0857          Bed-Chair Transfer    Bed-Chair [place] (Transfers) moderate assist (50% patient effort);verbal cues  -CS       Row Name 10/17/23 0857          Sit-Stand Transfer    Sit-Stand [place] (Transfers) moderate assist (50% patient effort);verbal cues  -CS       Row Name 10/17/23 0857          Stand-Sit Transfer    Stand-Sit [place] (Transfers) moderate assist (50%  patient effort);verbal cues  -       Row Name 10/17/23 0857          Activities of Daily Living    BADL Assessment/Intervention lower body dressing;grooming  -       Row Name 10/17/23 0857          Lower Body Dressing Assessment/Training    [place] Level (Lower Body Dressing) don;socks;dependent (less than 25% patient effort)  -     Position (Lower Body Dressing) supine  -       Row Name 10/17/23 0857          Grooming Assessment/Training    [place] Level (Grooming) hair care, combing/brushing;maximum assist (25% patient effort)  -CS     Position (Grooming) supported sitting  -               User Key  (r) = Recorded By, (t) = Taken By, (c) = Cosigned By      Initials Name Provider Type     Laurita Pandey OT Occupational Therapist                   Obj/Interventions       Row Name 10/17/23 0858          Sensory Assessment (Somatosensory)    Sensory Assessment (Somatosensory) UE sensation intact  -       Row Name 10/17/23 0858          Range of Motion Comprehensive    General Range of Motion bilateral upper extremity ROM WFL  -       Row Name 10/17/23 0858          Strength Comprehensive (MMT)    Comment, General Manual Muscle Testing (MMT) Assessment BUE grossly 3/5  -       Row Name 10/17/23 0858          Balance    Balance Assessment sitting static balance;sitting dynamic balance;standing static balance;standing dynamic balance  -     Static Sitting Balance contact guard  -CS     Dynamic Sitting Balance minimal assist  -CS     Position, Sitting Balance sitting edge of bed  -     Static Standing Balance moderate assist  -CS     Dynamic Standing Balance moderate assist  -CS     Position/Device Used, Standing Balance supported  -CS     Balance Interventions sitting;standing;static;dynamic;dynamic reaching;occupation based/functional task;weight shifting activity  -CS               User Key  (r) = Recorded By, (t) = Taken By, (c) = Cosigned By      Initials Name Provider Type    CS  Laurita Pandey, OT Occupational Therapist                   Goals/Plan       Row Name 10/17/23 0902          Transfer Goal 1 (OT)    Activity/Assistive Device (Transfer Goal 1, OT) sit-to-stand/stand-to-sit;toilet  -CS     [place] Level/Cues Needed (Transfer Goal 1, OT) minimum assist (75% or more patient effort)  -CS     Time Frame (Transfer Goal 1, OT) long term goal (LTG);10 days  -CS     Progress/Outcome (Transfer Goal 1, OT) goal revised this date  -CS       Row Name 10/17/23 0902          Dressing Goal 1 (OT)    Activity/Device (Dressing Goal 1, OT) lower body dressing  -CS     [place]/Cues Needed (Dressing Goal 1, OT) moderate assist (50-74% patient effort)  -CS     Time Frame (Dressing Goal 1, OT) long term goal (LTG);10 days  -CS     Strategies/Barriers (Dressing Goal 1, OT) don/doff socks w/ AAD  -CS     Progress/Outcome (Dressing Goal 1, OT) goal ongoing  -CS       Row Name 10/17/23 0902          Toileting Goal 1 (OT)    Activity/Device (Toileting Goal 1, OT) adjust/manage clothing;perform perineal hygiene  -CS     [place] Level/Cues Needed (Toileting Goal 1, OT) moderate assist (50-74% patient effort)  -CS     Time Frame (Toileting Goal 1, OT) long term goal (LTG);10 days  -CS     Progress/Outcome (Toileting Goal 1, OT) goal ongoing  -CS       Row Name 10/17/23 0902          Problem Specific Goal 1 (OT)    Progress/Outcome (Problem Specific Goal 1, OT) goal no longer appropriate  -CS       Row Name 10/17/23 0902          Therapy Assessment/Plan (OT)    Planned Therapy Interventions (OT) activity tolerance training;adaptive equipment training;BADL retraining;functional balance retraining;occupation/activity based interventions;ROM/therapeutic exercise;strengthening exercise;transfer/mobility retraining  -CS               User Key  (r) = Recorded By, (t) = Taken By, (c) = Cosigned By      Initials Name Provider Type    CS Laurita Pandey, LEONOR Occupational Therapist                   Clinical  Impression       Row Name 10/17/23 0900          Pain Assessment    Pretreatment Pain Rating 3/10  -CS     Posttreatment Pain Rating 2/10  -CS     Pain Location incisional  -CS     Pain Location - abdomen  -CS     Pre/Posttreatment Pain Comment tolerated  -CS     Pain Intervention(s) Repositioned;Ambulation/increased activity  -CS       Row Name 10/17/23 0900          Plan of Care Review    Plan of Care Reviewed With patient  -CS     Progress improving  -CS     Outcome Evaluation OT Re-eval complete. Pt presents w/ generalized weakness/fatigue, abd discomfort, and poor functional endurance limiting functional independence from baseline. Recommend cont skilled IPOT POC to promote return to PLOF. Recommend pt DC to IP rehab.  -CS       Row Name 10/17/23 0900          Therapy Assessment/Plan (OT)    Patient/Family Therapy Goal Statement (OT) Return to PLOF  -CS     Rehab Potential (OT) good, to achieve stated therapy goals  -CS     Criteria for Skilled Therapeutic Interventions Met (OT) yes;meets criteria;skilled treatment is necessary  -CS     Therapy Frequency (OT) daily  -CS       Row Name 10/17/23 0900          Therapy Plan Review/Discharge Plan (OT)    Anticipated Discharge Disposition (OT) inpatient rehabilitation facility  -       Row Name 10/17/23 0900          Vital Signs    Pre Systolic BP Rehab 111  -CS     Pre Treatment Diastolic BP 54  -CS     Post Systolic BP Rehab 114  -CS     Post Treatment Diastolic BP 51  -CS     Pretreatment Heart Rate (beats/min) 101  -CS     Posttreatment Heart Rate (beats/min) 103  -CS     Pre SpO2 (%) 100  -CS     O2 Delivery Pre Treatment nasal cannula  -CS     Post SpO2 (%) 100  -CS     O2 Delivery Post Treatment nasal cannula  -CS     Pre Patient Position Supine  -CS     Intra Patient Position Standing  -CS     Post Patient Position Sitting  -CS       Row Name 10/17/23 0900          Positioning and Restraints    Pre-Treatment Position in bed  -CS     Post Treatment Position  chair  -CS     In Chair notified nsg;reclined;call light within reach;encouraged to call for assist;exit alarm on;RUE elevated;LUE elevated;waffle cushion;legs elevated;heels elevated;with nsg  -CS               User Key  (r) = Recorded By, (t) = Taken By, (c) = Cosigned By      Initials Name Provider Type    Laurita Villalobos OT Occupational Therapist                   Outcome Measures       Row Name 10/17/23 0903          How much help from another is currently needed...    Putting on and taking off regular lower body clothing? 1  -CS     Bathing (including washing, rinsing, and drying) 2  -CS     Toileting (which includes using toilet bed pan or urinal) 1  -CS     Putting on and taking off regular upper body clothing 2  -CS     Taking care of personal grooming (such as brushing teeth) 2  -CS     Eating meals 2  -CS     AM-PAC 6 Clicks Score (OT) 10  -CS       Row Name 10/17/23 0903          Functional Assessment    Outcome Measure Options AM-PAC 6 Clicks Daily Activity (OT)  -CS               User Key  (r) = Recorded By, (t) = Taken By, (c) = Cosigned By      Initials Name Provider Type    Laurita Villalobos OT Occupational Therapist                    Occupational Therapy Education       Title: PT OT SLP Therapies (In Progress)       Topic: Occupational Therapy (In Progress)       Point: ADL training (In Progress)       Description:   Instruct learner(s) on proper safety adaptation and remediation techniques during self care or transfers.   Instruct in proper use of assistive devices.                  Learning Progress Summary             Patient Acceptance, E, NR by  at 10/17/2023 0903    Acceptance, TB,E, VU,DU by  at 10/16/2023 1227    Acceptance, E, VU by  at 10/13/2023 0952   Family Acceptance, TB,E, VU,DU by  at 10/16/2023 1227    Acceptance, E, VU by  at 10/13/2023 0952                         Point: Home exercise program (Done)       Description:   Instruct learner(s) on appropriate technique  for monitoring, assisting and/or progressing therapeutic exercises/activities.                  Learning Progress Summary             Patient Acceptance, TB,E, VU,DU by  at 10/16/2023 1227   Family Acceptance, TB,E, VU,DU by  at 10/16/2023 1227                         Point: Precautions (In Progress)       Description:   Instruct learner(s) on prescribed precautions during self-care and functional transfers.                  Learning Progress Summary             Patient Acceptance, E, NR by  at 10/17/2023 0903    Acceptance, TB,E, VU,DU by  at 10/16/2023 1227    Acceptance, E, VU by  at 10/13/2023 0952   Family Acceptance, TB,E, VU,DU by  at 10/16/2023 1227    Acceptance, E, VU by  at 10/13/2023 0952                         Point: Body mechanics (In Progress)       Description:   Instruct learner(s) on proper positioning and spine alignment during self-care, functional mobility activities and/or exercises.                  Learning Progress Summary             Patient Acceptance, E, NR by  at 10/17/2023 0903    Acceptance, TB,E, VU,DU by  at 10/16/2023 1227   Family Acceptance, TB,E, VU,DU by  at 10/16/2023 1227                                         User Key       Initials Effective Dates Name Provider Type Discipline     06/16/21 -  Marybeth Donovan RN Registered Nurse Nurse     09/02/21 -  Laurita Pandey OT Occupational Therapist OT     06/16/21 -  Alisa Weir OT Occupational Therapist OT                  OT Recommendation and Plan  Planned Therapy Interventions (OT): activity tolerance training, adaptive equipment training, BADL retraining, functional balance retraining, occupation/activity based interventions, ROM/therapeutic exercise, strengthening exercise, transfer/mobility retraining  Therapy Frequency (OT): daily  Plan of Care Review  Plan of Care Reviewed With: patient  Progress: improving  Outcome Evaluation: OT Re-eval complete. Pt presents w/ generalized  weakness/fatigue, abd discomfort, and poor functional endurance limiting functional independence from baseline. Recommend cont skilled IPOT POC to promote return to PLOF. Recommend pt DC to IP rehab.     Time Calculation:         Time Calculation- OT       Row Name 10/17/23 0904             Time Calculation- OT    OT Start Time 0803  -CS      OT Received On 10/17/23  -CS      OT Goal Re-Cert Due Date 10/27/23  -CS         Timed Charges    23097 - OT Therapeutic Activity Minutes 18  -CS      86710 - OT Self Care/Mgmt Minutes 6  -CS         Untimed Charges    OT Eval/Re-eval Minutes 20  -CS         Total Minutes    Timed Charges Total Minutes 24  -CS      Untimed Charges Total Minutes 20  -CS       Total Minutes 44  -CS                User Key  (r) = Recorded By, (t) = Taken By, (c) = Cosigned By      Initials Name Provider Type    CS Laurita Pandey OT Occupational Therapist                  Therapy Charges for Today       Code Description Service Date Service Provider Modifiers Qty    65828594303 HC OT THERAPEUTIC ACT EA 15 MIN 10/17/2023 Laurita Pandey OT GO 1    66879264648 HC OT SELF CARE/MGMT/TRAIN EA 15 MIN 10/17/2023 Laurita Pandey OT GO 1    23184251105 HC OT RE-EVAL 2 10/17/2023 Laurita Pandey OT GO 1                 Laurita Pandey OT  10/17/2023    Electronically signed by Laurita Pandey OT at 10/17/23 0905

## 2023-10-19 NOTE — PLAN OF CARE
Goal Outcome Evaluation:  Plan of Care Reviewed With: patient, son        Progress: improving  Outcome Evaluation: Pt demonstrating improvements thhis date by ambulating 45 ft w/ Ruthann and using FWW. Followed closely w/ chair d/t fatigue. Pt continues to present below baseline d/t abd pain and deficits in strength, balance, and activity tolerance. Pt would continue to benefit from skilled IP PT. Recommend IRF at d/c.      Anticipated Discharge Disposition (PT): inpatient rehabilitation facility

## 2023-10-19 NOTE — CONSULTS
Chief Complaint:  Nausea/vomiting, abdominal distention     HPI:  Iqra Britt is a 72 y.o. female with PMHx significant for HFrEF s/p ICD (LVEF 26%), HTN, CKDIII, Arthritis, DMII, COPD who presents to [facility] with complaints of abdominal distention and nausea/vomiting, abdominal pain. Patient reports in May she developed worsening vaginal bleeding, she underwent endometrial biopsy that was negative for cancer. She continued to have on/off symptoms and underwent D&C on Monday that reportedly showed abnormal fluid in her uterus, pathology is still pending. About three weeks ago she developed progressive abdominal swelling and distention and over the course of the last few days she has developed increasing pain and nausea. Son notes significant weight loss and poor appetite over the last several months. CT in the ED with SBO and large ovarian mass w/ascites.        Personal History      Medical History        Past Medical History:   Diagnosis Date    Arthritis      CHF (congestive heart failure)      COPD (chronic obstructive pulmonary disease)      Diabetes mellitus      Hyperlipidemia      Renal disorder                       Surgical History         Past Surgical History:   Procedure Laterality Date    CHOLECYSTECTOMY        DILATATION AND CURETTAGE                Family History: family history is not on file.      Social History:  reports that she quit smoking about 35 years ago. Her smoking use included cigarettes. She has never used smokeless tobacco. She reports that she does not currently use alcohol. She reports that she does not use drugs.  Social History          Social History Narrative    Not on file         Medications:  Available home medication information reviewed.    Prescriptions Prior to Admission   (Not in a hospital admission)              Allergies   Allergen Reactions    Paxil [Paroxetine] Anxiety               Objective   Objective      Vital Signs:   Temp:  [97.7 °F (36.5 °C)-98.3 °F  (36.8 °C)] 98.3 °F (36.8 °C)  Heart Rate:  [103-125] 109  Resp:  [20] 20  BP: (119-133)/(73-91) 119/74     Physical Exam   Constitutional: Awake, alert  Eyes: PERRLA, sclerae anicteric, no conjunctival injection  HENT: NCAT, mucous membranes moist  Neck: Supple, no thyromegaly, no lymphadenopathy, trachea midline  Respiratory: Clear to auscultation bilaterally, nonlabored respirations   Cardiovascular: RRR, no murmurs, rubs, or gallops, palpable pedal pulses bilaterally  Gastrointestinal: significantly distended abdomen with ascites present, mostly non-tender  Musculoskeletal: No bilateral ankle edema, no clubbing or cyanosis to extremities  Psychiatric: Appropriate affect, cooperative  Neurologic: Oriented x 3, strength symmetric in all extremities, Cranial Nerves grossly intact to confrontation, speech clear  Skin: No rashes        Result Review:  I have personally reviewed the results from the time of this admission to 10/11/2023 17:22 EDT and agree with these findings:  [x]  Laboratory list / accordion  [x]  Microbiology  [x]  Radiology  [x]  EKG/Telemetry   [x]  Cardiology/Vascular   [x]  Pathology  [x]  Old records  []  Other:  Most notable findings include:            LAB RESULTS:          Lab 10/11/23  1340   WBC 14.40*   HEMOGLOBIN 12.0   HEMATOCRIT 38.9   PLATELETS 585*   NEUTROS ABS 13.15*   IMMATURE GRANS (ABS) 0.13*   LYMPHS ABS 0.25*   MONOS ABS 0.84   EOS ABS 0.00   MCV 82.4   PROTIME 16.9*   INR 1.36*                Lab 10/11/23  1354 10/11/23  1347 10/11/23  1340   SODIUM  --   --  133*   POTASSIUM  --   --  4.5   CHLORIDE  --   --  91*   CO2  --   --  22.0   ANION GAP  --   --  20.0*   BUN  --   --  58*   CREATININE 2.10 2.10* 1.72*   EGFR  --   --  31.3*   GLUCOSE  --   --  358*   CALCIUM  --   --  8.5*              Lab 10/11/23  1340   TOTAL PROTEIN 7.2   ALBUMIN 3.3*   GLOBULIN 3.9   ALT (SGPT) 13   AST (SGOT) 31   BILIRUBIN 0.4   ALK PHOS 56   LIPASE 7*              Lab 10/11/23  1340   PROBNP  1,889.0*   HSTROP T 28*                  Urinalysis Results:   UA            10/11/2023    13:44   Urinalysis   Squamous Epithelial Cells, UA 21-30    Specific Gravity, UA 1.023    Ketones, UA Trace    Blood, UA Trace    Leukocytes, UA Moderate (2+)    Nitrite, UA Negative    RBC, UA 3-6    WBC, UA Too Numerous to Count    Bacteria, UA 1+             Microbiology Results (last 10 days)         ** No results found for the last 240 hours. **                  Radiology results from the last 24 hours:   CT Abdomen Pelvis Without Contrast     Result Date: 10/11/2023  CT ABDOMEN PELVIS WO CONTRAST Date of Exam: 10/11/2023 2:43 PM CDT Indication: abd pain, distension, recent dx of uterine mass, Cr 2.1. Comparison: None available. Technique: Axial CT images were obtained of the abdomen and pelvis without the administration of contrast. Reconstructed coronal and sagittal images were also obtained. Automated exposure control and iterative construction methods were used. Findings: LUNG BASES:  Unremarkable without mass or infiltrate. LIVER:  Unremarkable parenchyma without focal lesion. BILIARY/GALLBLADDER: Cholecystectomy SPLEEN:  Unremarkable PANCREAS:  Unremarkable ADRENAL:  Unremarkable KIDNEYS:  Unremarkable parenchyma with no solid mass identified. No obstruction.  No calculus identified. GASTROINTESTINAL/MESENTERY: Dilated small intestine measuring up to 4 cm in diameter with somewhat gradual tapering in the right central lower abdomen in the region of abdominopelvic mass. There is likely an element of mass effect/partial obstruction related to the mass. Distal small intestine is decompressed. There is a moderate proximal and mid fecal load. AORTA/IVC:  Normal caliber. RETROPERITONEUM/LYMPH NODES: There are enlarged retroperitoneal lymph nodes including: *2.2 x 2.0 cm left para-aortic mid abdominal node (image 64, series 2) *1.5 x 1.4 cm left para-aortic lower abdominal node (image 70, series 2) REPRODUCTIVE: There is a  heterogeneous 14.9 x 11.7 cm mass within the central upper pelvis/lower abdomen. While this is adjacent to the uterine fundus it does not appear to be in direct communication and is most suggestive of an ovarian neoplasm likely arising from the right ovary. There is a heterogeneously enlarged left ovary measuring 5.0 x 4.6 cm. BLADDER:  Unremarkable OSSEUS STRUCTURES:  Typical for age with no acute process identified. There is small to moderate volume ascites. There are areas of mesenteric spider webbing/infiltration with some suggestion of nodularity worrisome for carcinomatosis. However, this is difficult to assess given lack of intravenous contrast.      Impression: Impression: 1.Large central abdominopelvic mass, likely arising from the right ovary. 2.Dilated small intestine with somewhat gradual transition in the right anterior lower abdomen near the after mentioned mass suggestive of at least partial mass effect/obstruction. 3.Small to moderate volume ascites with imaging features concerning for carcinomatosis. 4.Enlarged retroperitoneal lymph nodes, likely metastatic. Electronically Signed: Tres Bauer MD  10/11/2023 3:09 PM CDT  Workstation ID: [id number]     XR Chest 1 View     Result Date: 10/11/2023  XR CHEST 1 VW Date of Exam: 10/11/2023 2:10 PM EDT Indication: abd pain, distension, recent dx of uterine cancer, ascites Comparison: None available. Findings: A left subclavian ICD is in place. The lungs are clear. The heart and mediastinal contours appear normal. There is no pleural effusion. The pulmonary vasculature appears normal. The osseous structures appear intact.      Impression: Impression: No acute cardiopulmonary process. Electronically Signed: Albert Rangel MD  10/11/2023 2:46 PM EDT  Workstation ID: [id number]                     Assessment & Plan  Assessment & Plan            Active Hospital Problems     Diagnosis   POA    **Ovarian mass [N83.8]   Yes    COPD (chronic obstructive pulmonary  disease) [J44.9]   Yes    Acute HFrEF (heart failure with reduced ejection fraction) [I50.21]   Yes    CKD (chronic kidney disease), stage III [N18.30]   Yes      Iqra Britt is a 72 y.o. female with PMHx significant for HFrEF/NICM s/p BiV ICD LVEF 26%), HTN, CKDIII, Arthritis, DMII, COPD who presents to [facility] with complaints of abdominal distention and nausea/vomiting, abdominal pain.     SBO:  - secondary to large ovarian mass  - Dr. Draper to evaluate, recommends placing NGT in ED  - NPO, gentle IVF for now  - PRN pain control     Large Pelvic Mass w/Ascites and concern for Carcinomatosis and metastatic LAD  - , CEA, CA-125 all significantly elevated per review of OSH records, recheck CA-125  - concern for primary Ovarian Malignancy w/Malignant Ascites  - Dr. Draper to evaluate, may need surgical debulking?   - recent negative endometrial bx 5/2023 and recent D&C Monday w/path still pending  - given her comorbidities, she is not a great surgical candidate     CKDIII  - baseline appears to be around 1.5?  - monitor closely  - avoid nephrotoxins, hold lasix while NPO     HFrEF/NICM s/p BiV ICD (LVEF 26% 9/2023)  - followed by Cardiology at   - does not appear to be on GDMT, will hold lasix for now due to NPO/SBO, need to have pharmacy clarify med rec, looks like she may have recently been started on Entresto, Ibravadine?  - monitor for volume overload, appears euvolemic currently     Possible UTI:  - sample contaminated with significant squamous cells, will repeat and treat if indicated     COPD:  - not in exacerbation  - PRN duo-nebs     DMII:  - SSI coverage while NPO       -------  History obtained, patient examined, discussed with patient, son, and son who works with Dr. Fernández in [place].    Presentation with obstruction with finding of multifocal obstructing phenomenon including left upper quadrant and jejunum.  Carcinomatosis.    Diversion with colostomy has been performed.    Pain has been  relieved, previously crampy obstructive type symptoms.    We discussed further optimization/recovery from surgery  Anticipated oncologic treatment with Dr. Nino.  Diagnosis of adenocarcinoma of GI origin likely adequate for selection of Oncologic agent/therapy    Further work-up of primary could be considered if overall health permits and/or if this would be impactful to her care/treatment-at the discretion of Dr. Nino.    They have an appoint with Dr. Nino Monday.   no

## 2023-10-19 NOTE — PROGRESS NOTES
[facility] at [facility]  IP Progress Note      Chief Complaint/Reason for service: #1 LV systolic dysfunction    Subjective   Subjective: Patient is awake and alert.  She states she feels better.  She acknowledges that she wheezes some at home.  She denies chest pain.  She has never been on Jardiance    Past medical, surgical, social and family history reviewed in the patient's electronic medical record.    Objective     Vital Sign Min/Max for last 24 hours  Temp  Min: 97.2 °F (36.2 °C)  Max: 98.2 °F (36.8 °C)   BP  Min: 130/70  Max: 140/76   Pulse  Min: 88  Max: 97   Resp  Min: 16  Max: 18   SpO2  Min: 97 %  Max: 99 %   No data recorded      Intake/Output Summary (Last 24 hours) at 10/19/2023 0701  Last data filed at 10/19/2023 0530  Gross per 24 hour   Intake 120 ml   Output 3025 ml   Net -2905 ml             Current Facility-Administered Medications:     acetaminophen (TYLENOL) tablet 650 mg, 650 mg, Oral, Q4H PRN, Ajay Steele, PharmD    Calcium Replacement - Follow Nurse / BPA Driven Protocol, , Does not apply, PRN, Dana Draper MD    cefTRIAXone (ROCEPHIN) 1000 mg/100 mL 0.9% NS (MBP), 1,000 mg, Intravenous, Q24H, Carin Comer MD, 1,000 mg at 10/19/23 0525    clonazePAM (KlonoPIN) tablet 0.5 mg, 0.5 mg, Oral, BID PRN, Tiffany Cristina APRN, 0.5 mg at 10/18/23 2203    dextrose (D50W) (25 g/50 mL) IV injection 25 g, 25 g, Intravenous, Q15 Min PRN, Dana Draper MD, 25 g at 10/19/23 0534    dextrose (GLUTOSE) oral gel 15 g, 15 g, Oral, Q15 Min PRN, Dana Draper MD    donepezil (ARICEPT) tablet 5 mg, 5 mg, Oral, Nightly, Ajay Steele, PharmD, 5 mg at 10/18/23 2013    famotidine (PEPCID) injection 20 mg, 20 mg, Intravenous, Daily, Dana Draper MD, 20 mg at 10/18/23 0819    folic acid (FOLVITE) tablet 1 mg, 1 mg, Oral, Daily, Ajay Steele, PharmD    glucagon (GLUCAGEN) injection 1 mg, 1 mg, Intramuscular, Q15 Min PRN, Dana Draper MD    heparin  (porcine) 5000 UNIT/ML injection 5,000 Units, 5,000 Units, Subcutaneous, Q8H, Dana Draper MD, 5,000 Units at 10/19/23 0525    hydrALAZINE (APRESOLINE) injection 10 mg, 10 mg, Intravenous, Q6H PRN, Dana Draper MD    HYDROmorphone (DILAUDID) injection 0.25 mg, 0.25 mg, Intravenous, Q4H PRN, Elizabeth Dietz MD, 0.25 mg at 10/17/23 1408    insulin detemir (LEVEMIR) injection 10 Units, 10 Units, Subcutaneous, BID, Case, Regla V., DO, 10 Units at 10/18/23 2012    insulin regular (humuLIN R,novoLIN R) injection 3-14 Units, 3-14 Units, Subcutaneous, Q6H, Dana Draper MD, 8 Units at 10/18/23 1729    insulin regular (humuLIN R,novoLIN R) injection 5 Units, 5 Units, Subcutaneous, Q6H, Case, Regla V., DO, 5 Units at 10/19/23 0003    ipratropium-albuterol (DUO-NEB) nebulizer solution 3 mL, 3 mL, Nebulization, Q4H PRN, Dana Draper MD    ivabradine HCl (CORLANOR) tablet 7.5 mg, 7.5 mg, Oral, BID With Meals, Ajay Steele, PharmD, 7.5 mg at 10/18/23 1729    Magnesium Standard Dose Replacement - Follow Nurse / BPA Driven Protocol, , Does not apply, PRN, Dana Draper MD    metroNIDAZOLE (FLAGYL) IVPB 500 mg, 500 mg, Intravenous, Q8H, Carin Comer MD, Last Rate: 100 mL/hr at 10/19/23 0643, 500 mg at 10/19/23 0643    ondansetron (ZOFRAN) injection 4 mg, 4 mg, Intravenous, Q6H PRN, Dana Draper MD, 4 mg at 10/16/23 0644    phenol (CHLORASEPTIC) 1.4 % liquid 1 spray, 1 spray, Mouth/Throat, Q2H PRN, Dana Draper MD, 1 spray at 10/17/23 1535    Phosphorus Replacement - Follow Nurse / BPA Driven Protocol, , Does not apply, PRN, Dana Draper MD    Potassium Replacement - Follow Nurse / BPA Driven Protocol, , Does not apply, Bonny ZENDEJAS Hope M., MD    sacubitril-valsartan (ENTRESTO) 24-26 MG tablet 1 tablet, 1 tablet, Oral, BID, Ajay Steele, PharmD, 1 tablet at 10/18/23 2204    sertraline (ZOLOFT) tablet 75 mg, 75 mg, Oral, Daily, Ajay Steele, PharmD    [COMPLETED]  Insert Peripheral IV, , , Once **AND** sodium chloride 0.9 % flush 10 mL, 10 mL, Intravenous, PRN, Dana Draper MD    sodium chloride 0.9 % flush 10 mL, 10 mL, Intravenous, Q12H, Dana Draper MD, 10 mL at 10/18/23 2013    sodium chloride 0.9 % flush 10 mL, 10 mL, Intravenous, PRN, Dana Draper MD    sodium chloride 0.9 % infusion 40 mL, 40 mL, Intravenous, PRN, Dana Draper MD    spironolactone (ALDACTONE) tablet 25 mg, 25 mg, Oral, Daily, Ajay Steele PharmESTER    thiamine (VITAMIN B-1) tablet 100 mg, 100 mg, Oral, Daily, Ajay Steele, PharmD    Physical Exam: General pleasant short female in bed 75 degree angle with minimal resting tachypnea no overt dyspnea        HEENT: No overt JVP.  No icterus       Respiratory: Equal bilateral symmetrical expansion with expiratory wheezes       Cardiovascular: Regular rate and rhythm with no gallop or murmur and 1+ edema to palpation       GI: Distended       Lower Extremities: No lesions       Neuro: Facial expressions symmetrical moves all 4 extremities       Skin: Warm and dry with pitting edema to palpation       Psych: Pleasant affect    Results Review: Overnight output exceeds intake by 2.7 L.  She is still a net +2.9 L.  Hemoglobin 9.4.  White blood count remains elevated at 16,000.  Potassium 3.7 hemoglobin 9.4 GFR 92.7    Radiology Results:  Imaging Results (Last 72 Hours)       ** No results found for the last 72 hours. **            EKG: Sinus rhythm    ECHO: Last known EF 2628%    Tele: Sinus rhythm    Assessment   Assessment/Plan: This patient has LV systolic dysfunction-she was started on Entresto yesterday and is tolerating it nicely.  She does have some resting tachypnea today and she is wheezing.  I will give her a dose of Lasix 40 mg IV x1.  We will start Jardiance 10 mg daily.  We will titrate Entresto over the next couple of days    Josue Olvera MD  10/19/23  07:01 EDT

## 2023-10-20 ENCOUNTER — READMISSION MANAGEMENT (OUTPATIENT)
Dept: CALL CENTER | Facility: HOSPITAL | Age: 73
End: 2023-10-20
Payer: OTHER GOVERNMENT

## 2023-10-20 VITALS
HEIGHT: 61 IN | HEART RATE: 96 BPM | BODY MASS INDEX: 27.75 KG/M2 | TEMPERATURE: 97.7 F | RESPIRATION RATE: 18 BRPM | SYSTOLIC BLOOD PRESSURE: 116 MMHG | WEIGHT: 147 LBS | OXYGEN SATURATION: 99 % | DIASTOLIC BLOOD PRESSURE: 61 MMHG

## 2023-10-20 LAB
ANION GAP SERPL CALCULATED.3IONS-SCNC: 7 MMOL/L (ref 5–15)
BUN SERPL-MCNC: 18 MG/DL (ref 8–23)
BUN/CREAT SERPL: 20.2 (ref 7–25)
CALCIUM SPEC-SCNC: 7.4 MG/DL (ref 8.6–10.5)
CHLORIDE SERPL-SCNC: 110 MMOL/L (ref 98–107)
CO2 SERPL-SCNC: 23 MMOL/L (ref 22–29)
CREAT SERPL-MCNC: 0.89 MG/DL (ref 0.57–1)
EGFRCR SERPLBLD CKD-EPI 2021: 69 ML/MIN/1.73
GLUCOSE BLDC GLUCOMTR-MCNC: 182 MG/DL (ref 70–130)
GLUCOSE BLDC GLUCOMTR-MCNC: 219 MG/DL (ref 70–130)
GLUCOSE BLDC GLUCOMTR-MCNC: 255 MG/DL (ref 70–130)
GLUCOSE SERPL-MCNC: 190 MG/DL (ref 65–99)
MAGNESIUM SERPL-MCNC: 1.7 MG/DL (ref 1.6–2.4)
PHOSPHATE SERPL-MCNC: 3 MG/DL (ref 2.5–4.5)
POTASSIUM SERPL-SCNC: 3.6 MMOL/L (ref 3.5–5.2)
POTASSIUM SERPL-SCNC: 3.9 MMOL/L (ref 3.5–5.2)
SODIUM SERPL-SCNC: 140 MMOL/L (ref 136–145)

## 2023-10-20 PROCEDURE — 84132 ASSAY OF SERUM POTASSIUM: CPT | Performed by: INTERNAL MEDICINE

## 2023-10-20 PROCEDURE — 99232 SBSQ HOSP IP/OBS MODERATE 35: CPT | Performed by: INTERNAL MEDICINE

## 2023-10-20 PROCEDURE — 83735 ASSAY OF MAGNESIUM: CPT | Performed by: OBSTETRICS & GYNECOLOGY

## 2023-10-20 PROCEDURE — 25010000002 CEFTRIAXONE PER 250 MG: Performed by: INTERNAL MEDICINE

## 2023-10-20 PROCEDURE — 99024 POSTOP FOLLOW-UP VISIT: CPT | Performed by: OBSTETRICS & GYNECOLOGY

## 2023-10-20 PROCEDURE — 84100 ASSAY OF PHOSPHORUS: CPT | Performed by: OBSTETRICS & GYNECOLOGY

## 2023-10-20 PROCEDURE — 63710000001 INSULIN LISPRO (HUMAN) PER 5 UNITS: Performed by: INTERNAL MEDICINE

## 2023-10-20 PROCEDURE — 25010000002 HEPARIN (PORCINE) PER 1000 UNITS: Performed by: OBSTETRICS & GYNECOLOGY

## 2023-10-20 PROCEDURE — 63710000001 INSULIN DETEMIR PER 5 UNITS: Performed by: INTERNAL MEDICINE

## 2023-10-20 PROCEDURE — 25010000002 METRONIDAZOLE 500 MG/100ML SOLUTION: Performed by: INTERNAL MEDICINE

## 2023-10-20 PROCEDURE — 80048 BASIC METABOLIC PNL TOTAL CA: CPT | Performed by: OBSTETRICS & GYNECOLOGY

## 2023-10-20 PROCEDURE — 63710000001 INSULIN REGULAR HUMAN PER 5 UNITS: Performed by: INTERNAL MEDICINE

## 2023-10-20 PROCEDURE — 82948 REAGENT STRIP/BLOOD GLUCOSE: CPT

## 2023-10-20 RX ORDER — POTASSIUM CHLORIDE 20 MEQ/1
40 TABLET, EXTENDED RELEASE ORAL EVERY 4 HOURS
Status: COMPLETED | OUTPATIENT
Start: 2023-10-20 | End: 2023-10-20

## 2023-10-20 RX ORDER — FUROSEMIDE 40 MG/1
40 TABLET ORAL DAILY
Start: 2023-10-21

## 2023-10-20 RX ORDER — FUROSEMIDE 40 MG/1
40 TABLET ORAL DAILY
Status: DISCONTINUED | OUTPATIENT
Start: 2023-10-21 | End: 2023-10-20 | Stop reason: HOSPADM

## 2023-10-20 RX ADMIN — SERTRALINE HYDROCHLORIDE 75 MG: 25 TABLET ORAL at 10:07

## 2023-10-20 RX ADMIN — Medication 10 ML: at 10:09

## 2023-10-20 RX ADMIN — SACUBITRIL AND VALSARTAN 1 TABLET: 24; 26 TABLET, FILM COATED ORAL at 11:46

## 2023-10-20 RX ADMIN — POTASSIUM CHLORIDE 40 MEQ: 1500 TABLET, EXTENDED RELEASE ORAL at 10:07

## 2023-10-20 RX ADMIN — SPIRONOLACTONE 25 MG: 25 TABLET, FILM COATED ORAL at 10:07

## 2023-10-20 RX ADMIN — INSULIN DETEMIR 5 UNITS: 100 INJECTION, SOLUTION SUBCUTANEOUS at 10:07

## 2023-10-20 RX ADMIN — INSULIN LISPRO 4 UNITS: 100 INJECTION, SOLUTION INTRAVENOUS; SUBCUTANEOUS at 11:47

## 2023-10-20 RX ADMIN — EMPAGLIFLOZIN 10 MG: 10 TABLET, FILM COATED ORAL at 10:07

## 2023-10-20 RX ADMIN — THIAMINE HCL TAB 100 MG 100 MG: 100 TAB at 10:06

## 2023-10-20 RX ADMIN — INSULIN LISPRO 2 UNITS: 100 INJECTION, SOLUTION INTRAVENOUS; SUBCUTANEOUS at 10:07

## 2023-10-20 RX ADMIN — IVABRADINE 7.5 MG: 5 TABLET, FILM COATED ORAL at 10:06

## 2023-10-20 RX ADMIN — POTASSIUM CHLORIDE 40 MEQ: 1500 TABLET, EXTENDED RELEASE ORAL at 11:46

## 2023-10-20 RX ADMIN — SODIUM CHLORIDE 1000 MG: 900 INJECTION INTRAVENOUS at 05:29

## 2023-10-20 RX ADMIN — INSULIN HUMAN 3 UNITS: 100 INJECTION, SOLUTION PARENTERAL at 10:08

## 2023-10-20 RX ADMIN — INSULIN HUMAN 3 UNITS: 100 INJECTION, SOLUTION PARENTERAL at 11:47

## 2023-10-20 RX ADMIN — CLONAZEPAM 0.5 MG: 0.5 TABLET ORAL at 10:06

## 2023-10-20 RX ADMIN — HEPARIN SODIUM 5000 UNITS: 5000 INJECTION INTRAVENOUS; SUBCUTANEOUS at 05:29

## 2023-10-20 RX ADMIN — METRONIDAZOLE 500 MG: 500 INJECTION, SOLUTION INTRAVENOUS at 05:29

## 2023-10-20 RX ADMIN — FAMOTIDINE 20 MG: 10 INJECTION, SOLUTION INTRAVENOUS at 10:06

## 2023-10-20 RX ADMIN — FOLIC ACID 1 MG: 1 TABLET ORAL at 10:07

## 2023-10-20 NOTE — DISCHARGE SUMMARY
[facility] Medicine Services  DISCHARGE SUMMARY    Patient Name: Iqra Britt  : 1950  MRN: [id number]    Date of Admission: 10/11/2023  2:08 PM  Date of Discharge:  10/20/2023  Primary Care Physician: Georgie Fernández,     Consults       Date and Time Order Name Status Description    10/19/2023  1:10 PM Inpatient Colorectal Surgery Consult Completed     10/18/2023  6:33 PM Inpatient Hematology & Oncology Consult Completed     10/13/2023 12:58 PM Inpatient Cardiology Consult Completed     10/11/2023  6:57 PM Inpatient Gynecologic Oncology Consult Completed             Hospital Course       Active Hospital Problems    Diagnosis  POA    **SBO (small bowel obstruction) [K56.609]  Yes    Ovarian mass [N83.8]  Yes    COPD (chronic obstructive pulmonary disease) [J44.9]  Yes    Chronic HFrEF (heart failure with reduced ejection fraction) [I50.22]  Yes    CKD (chronic kidney disease), stage III [N18.30]  Yes    Type 2 diabetes mellitus [E11.9]  Yes      Resolved Hospital Problems   No resolved problems to display.        Hospital Course:  Iqra Britt is a 72 y.o. female with history of type 2 diabetes, CKD stage III, NICM and current endocarditis, HFrEF with EF 26% who presented to [facility] 10/11/2023 with nausea and vomiting, found to have a pelvic mass with ascites and SBO.  She is s/p ex lap, ostomy and right salpingo oophorectomy and later required ICU care postoperatively.  Patient was on pressors that have since been weaned, she has been started on TPN, he was deemed stable for transfer to [facility] medicine assumed her care 10/18/2023     SBO secondary to Ovarian mass  Moderate differentiated adenocarcinoma  -GYN followed, she is s/p ex lap, ostomy and right salpingo-oophorectomy 10/15  -Continue postop care per gyn/onc, staples out POD #10-14  -Pathology reveals moderately differentiated adenocarcinoma, favor GI as primary  -Oncology consulted, patient  seen by Dr. Dewey, CEA was 59, patient is currently not a candidate for palliative chemotherapy however if she present next couple of weeks this could be a consideration.  She will follow-up with oncologist and has had, Dr. Nino  -Patient has also been seen by CRS Dr. Chavez no plans for any surgery     Malnutrition  -s/p NG tube, and TPN   -Currently tolerating regular diet diet being advanced as tolerated, currently on regular  -Discontinue TPN     Chronic HFrEF  -Last echo with EF of 26%  -Cardiology followed and managed meds, continue Entresto, spironolactone Jardiance   -Continue diuretics s/p 40 mg of IV Lasix x1   -Continue strict I&O's     CKD stage III  -Renal functions currently stable     Poorly controlled type 2 diabetes with A1c 8.8%  -FSBG's reviewed and appropriate  Continue Jardiance at home regimen     Anxiety  -Continue home Klonopin     COPD    Discharge Follow Up Recommendations for outpatient labs/diagnostics:  Follow-up with PCP in 1 week  Follow-up with oncology Dr. Nino in [place]  Follow-up with gyn/onc as scheduled    Day of Discharge     HPI: No acute events overnight, patient rested well    Review of Systems  Gen- No fevers, chills  CV- No chest pain, palpitations  Resp- No cough, dyspnea  GI- No N/V/D, abd pain     Vital Signs:   Temp:  [97.6 °F (36.4 °C)-98 °F (36.7 °C)] 98 °F (36.7 °C)  Heart Rate:  [] 96  Resp:  [16] 16  BP: (109-122)/(55-71) 118/68      Physical Exam:  Constitutional: Elderly female, in no acute distress, awake, alert  HENT: NCAT, mucous membranes moist  Respiratory: Nonfocal  Cardiovascular: RRR, no murmurs, rubs, or gallops  Gastrointestinal: Positive bowel sounds, colostomy in place, incision CDI  Musculoskeletal: No bilateral ankle edema  Psychiatric: Appropriate affect, cooperative  Neurologic: Focal  Skin: No rashes      Pertinent  and/or Most Recent Results     LAB RESULTS:      Lab 10/19/23  0401 10/18/23  0637 10/17/23  0546 10/17/23  0028  10/16/23  1818 10/16/23  1246 10/16/23  0639 10/16/23  0413 10/16/23  0339 10/16/23  0223 10/15/23  0405 10/14/23  1255   WBC 16.13* 16.35* 14.67*  --  14.16*  --   --   --   --  18.21*   < >  --    HEMOGLOBIN 9.4* 8.8* 9.0* 8.8* 8.9* 7.0*  --    < >  --  7.7*   < >  --    HEMOGLOBIN, POC  --   --   --   --   --   --   --   --   --   --    < >  --    HEMATOCRIT 29.4* 28.4* 28.9* 27.7* 29.0* 22.6*  --    < >  --  25.3*   < >  --    HEMATOCRIT POC  --   --   --   --   --   --   --   --   --   --    < >  --    PLATELETS 170 172 190  --  194  --   --   --   --  341   < >  --    NEUTROS ABS 14.52*  --   --   --  12.33*  --   --   --   --   --   --   --    IMMATURE GRANS (ABS)  --   --   --   --  0.22*  --   --   --   --   --   --   --    LYMPHS ABS  --   --   --   --  0.50*  --   --   --   --   --   --   --    MONOS ABS  --   --   --   --  1.08*  --   --   --   --   --   --   --    EOS ABS 0.00  --   --   --  0.01  --   --   --   --   --   --   --    MCV 82.8 84.0 84.8  --  85.8  --   --   --   --  87.2   < >  --    CRP  --   --   --   --   --  13.36*  --   --   --   --   --  16.83*   LACTATE  --   --   --   --   --   --  1.2  --  2.7*  --   --   --    PROTIME  --   --   --   --   --  18.0*  --   --   --   --   --   --     < > = values in this interval not displayed.         Lab 10/20/23  0446 10/19/23  0821 10/19/23  0401 10/18/23  0637 10/17/23  0546 10/16/23  1246 10/16/23  0223 10/14/23  2207 10/14/23  1255   SODIUM 140  --  141 142 144 148* 144   < > 146*   POTASSIUM 3.6  --  3.7 4.4 4.5 3.9 4.7   < > 3.5   CHLORIDE 110*  --  113* 113* 117* 123* 116*   < > 107   CO2 23.0  --  21.0* 23.0 21.0* 16.0* 17.0*   < > 19.0*   ANION GAP 7.0  --  7.0 6.0 6.0 9.0 11.0   < > 20.0*   BUN 18  --  22 27* 35* 34* 38*   < > 34*   CREATININE 0.89  --  0.68 0.83 1.03* 0.92 1.15*   < > 0.99   EGFR 69.0  --  92.7 75.0 57.9* 66.3 50.7*   < > 60.7   GLUCOSE 190*  --  60* 200* 299* 211* 268*   < > 152*   CALCIUM 7.4*  --  7.7* 7.5* 7.5*  6.2* 6.9*   < > 8.1*   IONIZED CALCIUM  --   --   --   --   --  1.07*  --   --  1.29   MAGNESIUM 1.7  --  2.0 2.7* 2.1  --  2.0   < > 2.3   PHOSPHORUS 3.0 2.6 2.2* 2.6 3.0 2.3* 2.1*   < > 4.0    < > = values in this interval not displayed.         Lab 10/16/23  1246 10/16/23  0223 10/14/23  1255   TOTAL PROTEIN 4.3* 4.7* 5.6*   ALBUMIN 2.1* 2.0* 2.6*   GLOBULIN 2.2 2.7 3.0   ALT (SGPT) 16 9 12   AST (SGOT) 59* 22 47*   BILIRUBIN 0.6 0.2 0.5   BILIRUBIN DIRECT 0.2  --  0.3   ALK PHOS 45 30* 54         Lab 10/19/23  0821 10/16/23  1246   PROBNP 8,129.0*  --    PROTIME  --  18.0*   INR  --  1.48*         Lab 10/16/23  1246 10/14/23  1255   CHOLESTEROL  --  135   TRIGLYCERIDES 157* 192*         Lab 10/14/23  1357   ABO TYPING A   RH TYPING Positive   ANTIBODY SCREEN Negative         Lab 10/16/23  0412 10/15/23  1049 10/15/23  1019   PH, ARTERIAL 7.371 7.39 7.41   PCO2, ARTERIAL 34.4*  --   --    PO2 ART 85.6  --   --    FIO2 28  --   --    HCO3 ART 19.9*  --   --    BASE EXCESS ART -4.9*  --   --    CARBOXYHEMOGLOBIN 1.9  --   --      Brief Urine Lab Results  (Last result in the past 365 days)        Color   Clarity   Blood   Leuk Est   Nitrite   Protein   CREAT   Urine HCG        10/12/23 0425 Yellow   Cloudy   Moderate (2+)   Small (1+)   Negative   30 mg/dL (1+)                 Microbiology Results (last 10 days)       Procedure Component Value - Date/Time    Blood Culture - Blood, Arm, Left [[id number]]  (Normal) Collected: 10/16/23 0414    Lab Status: Preliminary result Specimen: Blood from Arm, Left Updated: 10/20/23 0600     Blood Culture No growth at 4 days    Blood Culture - Blood, Hand, Left [[id number]]  (Normal) Collected: 10/16/23 0402    Lab Status: Preliminary result Specimen: Blood from Hand, Left Updated: 10/20/23 0600     Blood Culture No growth at 4 days    Narrative:      Aerobic bottle only    Body Fluid Culture - Body Fluid, Peritoneum [[id number]] Collected: 10/12/23 1139    Lab Status: Final result  Specimen: Body Fluid from Peritoneum Updated: 10/19/23 1537     Body Fluid Culture No growth at 3 days     Gram Stain Moderate (3+) WBCs per low power field      No organisms seen    Anaerobic Culture - Body Fluid, Peritoneum [[id number]]  (Normal) Collected: 10/12/23 1139    Lab Status: Final result Specimen: Body Fluid from Peritoneum Updated: 10/17/23 0802     Anaerobic Culture No anaerobes isolated at 5 days    Fungus Culture - Peritoneal Fluid, Peritoneum [[id number]] Collected: 10/12/23 1139    Lab Status: Preliminary result Specimen: Peritoneal Fluid from Peritoneum Updated: 10/19/23 1231     Fungus Culture No fungus isolated at 1 week    Fungus Smear - Peritoneal Fluid, Peritoneum [[id number]] Collected: 10/12/23 1139    Lab Status: Final result Specimen: Peritoneal Fluid from Peritoneum Updated: 10/13/23 1338     Fungal Stain No fungal elements seen    Urine Culture - Urine, Straight Cath [[id number]]  (Abnormal) Collected: 10/12/23 0425    Lab Status: Final result Specimen: Urine from Straight Cath Updated: 10/13/23 1333     Urine Culture >100,000 CFU/mL Lactobacillus species     Comment:   Based on laboratory diagnosis criteria, these organisms are common urogenital commensal bharath and have not been associated with urinary tract infections; clinical correlation is recommended.       Narrative:      Colonization of the urinary tract without infection is common. Treatment is discouraged unless the patient is symptomatic, pregnant, or undergoing an invasive urologic procedure.            Peripheral Block    Result Date: 10/15/2023  Clay Macdonald CRNA     10/15/2023 10:24 AM Peripheral Block Patient reassessed immediately prior to procedure Patient location during procedure: OR Reason for block: at surgeon's request and post-op pain management Performed by Anesthesiologist: Clay Bowen MD Preanesthetic Checklist Completed: patient identified, IV checked, site marked, risks and benefits discussed, surgical  "consent, monitors and equipment checked, pre-op evaluation and timeout performed Prep: Pt Position: supine Sterile barriers:cap, gloves, mask and washed/disinfected hands Prep: ChloraPrep Patient monitoring: blood pressure monitoring, continuous pulse oximetry and EKG Procedure Sedation: yes Performed under: general Guidance:ultrasound guided Images:still images obtained, printed/placed on chart Laterality:Bilateral Block Type:TAP Injection Technique:single-shot Needle Type:short-bevel and echogenic Needle Gauge:20 G Resistance on Injection: none Medications Used: dexamethasone sodium phosphate injection - Injection  4 mg - 10/15/2023 9:43:00 AM bupivacaine PF (MARCAINE) 0.25 % injection - Injection  55 mL - 10/15/2023 9:43:00 AM Medications Comment:Block Injection:  LA dose divided between Right and Left block Post Assessment Injection Assessment: negative aspiration for heme, incremental injection and no paresthesia on injection Patient Tolerance:comfortable throughout block Complications:no Additional Notes Mid-Axillary/Lateral TAPs A high-frequency linear transducer, with sterile cover, was placed in the midaxillary line between the ASIS and costal margin. The External Oblique Muscle (EOM), Internal Oblique Muscle (IOM), Transverse Abdominus Muscle (MONTENEGRO), and Peritoneum were identified. The insertion site was prepped in sterile fashion and then localized with 2-5 ml of 1% Lidocaine. Using ultrasound-guidance, a 20-gauge B-Rodríguez 4\" Ultraplex 360 non-stimulating echogenic needle was advanced in plane, from medial to lateral, until the tip of the needle was in the fascial plane between the IOM and MONTENEGRO. 1-3ml of preservative free normal saline was used to hydro-dissect the fascial planes. After the fascial plane was verified, the local anesthetic (LA) was injected. The procedure was repeated on the opposite side for bilateral coverage. Aspiration every 5 ml to prevent intravascular injection. Injection was completed " with negative aspiration of blood and negative intravascular injection. Injection pressures were normal with minimal resistance. Midaxillary TAPs should reach intercostal nerves T10- T11 and the subcostal nerve T12.      XR Abdomen KUB    Result Date: 10/14/2023  XR ABDOMEN KUB Date of Exam: 10/14/2023 7:25 PM EDT Indication: New NG placement check Comparison: KUB dated 10/13/2023 Findings: There is a gastric suction tube which terminates in the proximal gastric body. There is persistent abnormal small bowel distention representing either obstruction or ileus. The transverse colon appears relatively collapsed. There are right upper quadrant  cholecystectomy clips.     Impression: 1. Gastric suction tube terminates in the proximal gastric body. 2. Abnormal gaseous distention of the small bowel likely representing small bowel obstruction or possible ileus. Electronically Signed: Parth Rai  10/14/2023 7:53 PM EDT  Workstation ID: [id number]    XR Abdomen 2+ VW with Chest 1 VW    Addendum Date: 10/14/2023    ADDENDUM #1 Right PICC tip overlies the superior vena cava. Electronically Signed: Aldair Zhou MD  10/14/2023 4:54 PM EDT  Workstation ID: [id number] ORIGINAL REPORT: XR ABDOMEN 2+ VIEWS W CHEST 1 VW Date of Exam: 10/14/2023 2:26 PM EDT Indication: bowel obstruction Comparison: CT abdomen pelvis 10/11/2023. Findings: Nasogastric tube tip and sidehole overlie the stomach. Bowel gas pattern is similar to prior radiographs and CT with multiple loops of dilated small bowel. Lung bases are clear with pacemaker/defibrillator in place. Impression: Unchanged bowel gas pattern suggestive of persistent obstruction. Nasogastric tube tip and sidehole overlie the stomach. Electronically Signed: Aldair Zhou MD  10/14/2023 4:19 PM EDT  Workstation ID: [id number]    Result Date: 10/14/2023  XR ABDOMEN 2+ VIEWS W CHEST 1 VW Date of Exam: 10/14/2023 2:26 PM EDT Indication: bowel obstruction Comparison: CT abdomen pelvis  10/11/2023. Findings: Nasogastric tube tip and sidehole overlie the stomach. Bowel gas pattern is similar to prior radiographs and CT with multiple loops of dilated small bowel. Lung bases are clear with pacemaker/defibrillator in place.     Impression: Unchanged bowel gas pattern suggestive of persistent obstruction. Nasogastric tube tip and sidehole overlie the stomach. Electronically Signed: Aldair Zhou MD  10/14/2023 4:19 PM EDT  Workstation ID: [id number]    XR Abdomen KUB    Result Date: 10/13/2023  XR ABDOMEN KUB Date of Exam: 10/13/2023 4:32 AM EDT Indication: replacement of NG tube Comparison: 10/13/2023 at 0252 hours. Findings: Gastric suction tube is repositioned with the tip in the mid stomach. There are distended bowel loops in the upper abdomen. There is mild bibasilar atelectasis. No pneumoperitoneum.     Impression: Gastric suction tube tip is in the mid stomach. Electronically Signed: Faraz Roberts MD  10/13/2023 4:54 AM EDT  Workstation ID: [id number]    XR Abdomen KUB    Result Date: 10/13/2023  XR ABDOMEN KUB Date of Exam: 10/13/2023 3:09 AM EDT Indication: NG placement Comparison: 10/11/2023 and CT abdomen and pelvis same date. Findings: Gastric suction tube is folded in the distal esophagus. There are distended bowel loops in the abdomen measuring up to 41 mm consistent with ongoing obstruction. No evidence of pneumoperitoneum.     Impression: Gastric suction tube is folded in the distal esophagus. Electronically Signed: Faraz Roberts MD  10/13/2023 3:50 AM EDT  Workstation ID: [id number]    US Paracentesis    Result Date: 10/12/2023  US PARACENTESIS  History: concern for ovarian malignancy, malignant ascites  : Eron Aguilar MD.  Modality: Ultrasonography                   Sedation: None. Anesthesia: Lidocaine 1% local infiltration. Estimated blood loss:  0 cc.        Technique: A thorough discussion of the risks, benefits, and alternatives of the procedure, and if applicable,  moderate sedation, was carried out with the patient. They were encouraged to ask any questions. Any questions were answered. They verbalized understanding. A written informed consent was then signed.  A multi-component timeout was performed prior to starting the procedure using the departmental protocol. The procedure room personnel used personal protective equipment. The operators used sterile gloves and if indicated, sterile gowns. The surgical site was prepped with chlorhexidine gluconate  and draped in the maximal applicable sterile fashion. A preliminary ultrasonography was performed to assess the target and determine a safe access site. It showed a small pocket ascites in the right side of the abdomen and a backdrop severely distended fluid and fluid and air-filled loops of bowel in the vicinity. Pertinent ultrasound images were stored to the PACS for documentation. Discussion was carried out with the hospitalist. They indicated that the fluid was needed for diagnostic purposes. The patient understood the risks and agreed to proceed. The access site was sterilely prepped and draped. Local anesthesia was administered. A dermatotomy was performed if needed. A catheter over the needle system was advanced into the peritoneal cavity under real-time ultrasound guidance. Straw colored fluid  was aspirated and the plastic catheter advanced into the peritoneum. The catheter was then connected to a fluid recovery system. At the end of the procedure, the catheter was withdrawn and an aseptic dressing applied. The patient tolerated the procedure  well. After uneventful recovery recovery, the patient was discharged from the department in stable condition. The total amount of fluid recovered is given below. Albumin was infused intravenously if ordered by the referring doctor. Complications: None immediate. Specimen: The specimen was labeled and sent to the lab in appropriate carriers & containers if such was requested by  the ordering provider.     Impression:                                                              Successful ultrasound-guided paracentesis using a Right upper quadrant access with recovery of 0.7 liters of fluid as described above. Thank you for the opportunity to assist in the care of your patient. Electronically Signed: Eron Aguilar MD  10/12/2023 4:09 PM EDT  Workstation ID: [id number]    XR Abdomen KUB    Result Date: 10/11/2023  XR ABDOMEN KUB Date of Exam: 10/11/2023 5:58 PM CDT Indication: ng tube placement Comparison: None available. Findings: There is nasogastric tube with tip in the body of the stomach. There are dilated loops of small intestine.     Impression: There is nasogastric tube with tip in the body of the stomach Electronically Signed: Tres Bauer MD  10/11/2023 6:26 PM CDT  Workstation ID: [id number]    CT Abdomen Pelvis Without Contrast    Result Date: 10/11/2023  CT ABDOMEN PELVIS WO CONTRAST Date of Exam: 10/11/2023 2:43 PM CDT Indication: abd pain, distension, recent dx of uterine mass, Cr 2.1. Comparison: None available. Technique: Axial CT images were obtained of the abdomen and pelvis without the administration of contrast. Reconstructed coronal and sagittal images were also obtained. Automated exposure control and iterative construction methods were used. Findings: LUNG BASES:  Unremarkable without mass or infiltrate. LIVER:  Unremarkable parenchyma without focal lesion. BILIARY/GALLBLADDER: Cholecystectomy SPLEEN:  Unremarkable PANCREAS:  Unremarkable ADRENAL:  Unremarkable KIDNEYS:  Unremarkable parenchyma with no solid mass identified. No obstruction.  No calculus identified. GASTROINTESTINAL/MESENTERY: Dilated small intestine measuring up to 4 cm in diameter with somewhat gradual tapering in the right central lower abdomen in the region of abdominopelvic mass. There is likely an element of mass effect/partial obstruction related to the mass. Distal small intestine is decompressed.  There is a moderate proximal and mid fecal load. AORTA/IVC:  Normal caliber. RETROPERITONEUM/LYMPH NODES: There are enlarged retroperitoneal lymph nodes including: *2.2 x 2.0 cm left para-aortic mid abdominal node (image 64, series 2) *1.5 x 1.4 cm left para-aortic lower abdominal node (image 70, series 2) REPRODUCTIVE: There is a heterogeneous 14.9 x 11.7 cm mass within the central upper pelvis/lower abdomen. While this is adjacent to the uterine fundus it does not appear to be in direct communication and is most suggestive of an ovarian neoplasm likely arising from the right ovary. There is a heterogeneously enlarged left ovary measuring 5.0 x 4.6 cm. BLADDER:  Unremarkable OSSEUS STRUCTURES:  Typical for age with no acute process identified. There is small to moderate volume ascites. There are areas of mesenteric spider webbing/infiltration with some suggestion of nodularity worrisome for carcinomatosis. However, this is difficult to assess given lack of intravenous contrast.     Impression: 1.Large central abdominopelvic mass, likely arising from the right ovary. 2.Dilated small intestine with somewhat gradual transition in the right anterior lower abdomen near the after mentioned mass suggestive of at least partial mass effect/obstruction. 3.Small to moderate volume ascites with imaging features concerning for carcinomatosis. 4.Enlarged retroperitoneal lymph nodes, likely metastatic. Electronically Signed: Tres Bauer MD  10/11/2023 3:09 PM CDT  Workstation ID: [id number]    XR Chest 1 View    Result Date: 10/11/2023  XR CHEST 1 VW Date of Exam: 10/11/2023 2:10 PM EDT Indication: abd pain, distension, recent dx of uterine cancer, ascites Comparison: None available. Findings: A left subclavian ICD is in place. The lungs are clear. The heart and mediastinal contours appear normal. There is no pleural effusion. The pulmonary vasculature appears normal. The osseous structures appear intact.     Impression: No acute  cardiopulmonary process. Electronically Signed: Albert Rangel MD  10/11/2023 2:46 PM EDT  Workstation ID: [id number]               Plan for Follow-up of Pending Labs/Results:   Pending Labs       Order Current Status    Blood Culture - Blood, Arm, Left Preliminary result    Blood Culture - Blood, Hand, Left Preliminary result    Fungus Culture - Peritoneal Fluid, Peritoneum Preliminary result          Discharge Details        Discharge Medications        ASK your doctor about these medications        Instructions Start Date   albuterol sulfate  (90 Base) MCG/ACT inhaler  Commonly known as: PROVENTIL HFA;VENTOLIN HFA;PROAIR HFA   2 puffs, Inhalation, Every 4 Hours PRN      baclofen 10 MG tablet  Commonly known as: LIORESAL   10 mg, Oral, 3 Times Daily      benzonatate 100 MG capsule  Commonly known as: TESSALON   100 mg, Oral, 3 Times Daily PRN      clonazePAM 0.5 MG tablet  Commonly known as: KlonoPIN   0.5 mg, Oral, 2 Times Daily      colestipol 1 g tablet  Commonly known as: COLESTID   1 g, Oral, 2 Times Daily      Diclofenac Sodium 1 % gel gel  Commonly known as: VOLTAREN   4 g, Topical, 4 Times Daily PRN      donepezil 5 MG tablet  Commonly known as: ARICEPT   5 mg, Oral, Nightly      Evolocumab solution prefilled syringe injection  Commonly known as: REPATHA   140 mg, Subcutaneous, Every 14 Days      famotidine 40 MG tablet  Commonly known as: PEPCID   40 mg, Oral, Daily      furosemide 40 MG tablet  Commonly known as: LASIX   40 mg, Oral, Daily      HYDROcodone-acetaminophen 5-325 MG per tablet  Commonly known as: NORCO   1 tablet, Oral, Every 6 Hours PRN      insulin NPH-insulin regular (70-30) 100 UNIT/ML injection  Commonly known as: humuLIN 70/30,novoLIN 70/30   20 Units, Subcutaneous, Nightly      ivabradine HCl 7.5 MG tablet tablet  Commonly known as: CORLANOR   7.5 mg, Oral, 2 Times Daily With Meals      loratadine 10 MG tablet  Commonly known as: CLARITIN   10 mg, Oral, Daily      Magnesium  Oxide -Mg Supplement 500 MG tablet   500 mg, Oral, 2 Times Daily      montelukast 10 MG tablet  Commonly known as: SINGULAIR   10 mg, Oral, Nightly      ondansetron 4 MG tablet  Commonly known as: ZOFRAN   4 mg, Oral, Every 8 Hours PRN      potassium chloride 10 MEQ CR tablet   10 mEq, Oral, Daily      promethazine 12.5 MG tablet  Commonly known as: PHENERGAN   12.5 mg, Oral, Every 6 Hours PRN      sacubitril-valsartan 24-26 MG tablet  Commonly known as: ENTRESTO   1 tablet, Oral, 2 Times Daily      sertraline 50 MG tablet  Commonly known as: ZOLOFT   75 mg, Oral, Daily      simethicone 125 MG chewable tablet  Commonly known as: MYLICON   125 mg, Oral, Every 6 Hours PRN      spironolactone 25 MG tablet  Commonly known as: ALDACTONE   25 mg, Oral, Daily      sucralfate 1 g tablet  Commonly known as: CARAFATE   1 g, Oral, 4 Times Daily      Vitamin B Complex capsule   1 capsule, Oral, Daily      vitamin B-12 1000 MCG tablet  Commonly known as: CYANOCOBALAMIN   1,000 mcg, Oral, Daily      vitamin D 1.25 MG (64222 UT) capsule capsule  Commonly known as: ERGOCALCIFEROL   50,000 Units, Oral, Every 30 Days               Allergies   Allergen Reactions    Paxil [Paroxetine] Anxiety     Discharge Disposition: Home with home health    Diet:  Hospital:  Diet Order   Procedures    Diet: Diabetic Diets, Regular/House Diet, Gastrointestinal Diets; Consistent Carbohydrate; Fiber-Restricted; Fluid Consistency: Thin (IDDSI 0)        Activity: As tolerated      Restrictions or Other Recommendations:  Per general surgery       CODE STATUS:    Code Status and Medical Interventions:   Ordered at: 10/11/23 4712     Level Of Support Discussed With:    Patient     Code Status (Patient has no pulse and is not breathing):    CPR (Attempt to Resuscitate)     Medical Interventions (Patient has pulse or is breathing):    Full Support       Future Appointments   Date Time Provider Department Center   11/1/2023  3:30 PM Dana Draper MD [facility]  VARGAS Comer MD  10/20/23    Time Spent on Discharge:  I spent  30  minutes on this discharge activity which included: face-to-face encounter with the patient, reviewing the data in the system, coordination of the care with the nursing staff as well as consultants, documentation, and entering orders.

## 2023-10-20 NOTE — NURSING NOTE
Prior to central line removal, order for the removal of catheter was verified, patient was assessed, necessary materials were gathered and patient was educated regarding procedure .    Patient was positioned flat to ensure that the insertion site was at or below the level of the heart.    Hands were washed, clean gloves were applied and central line dressing was gently removed. Catheter exit site was not cultured.     A new pair of clean gloves were then applied. Insertion site was cleansed with 2% Chlorhexidine swab using a circular motion beginning at the insertion site and moving outward for 30 seconds and allowed to dry.     Clamp on line was not present.     Patient was instructed to hold breath as catheter was withdrawn.     The central line was grasped at the insertion site and slowly pulled outward parallel to the skin. Resistance was not met.    After central line was completely removed, a sterile 4x4 gauze pad was used to apply light pressure until bleeding stopped. At that time, petroleum-based gauze and a sterile occlusive dressing was applied to exit site.     Patient was instructed to keep dressing in place for at least 24 hours and to remain in a flat or reclining position for 30 minutes post-removal.     Catheter was inspected after removal and was intact. Tip of central line was not sent for culture. Patient tolerated procedure.

## 2023-10-20 NOTE — OUTREACH NOTE
Prep Survey      Flowsheet Row Responses   [facility] facility patient discharged from? [place]   Is LACE score < 7 ? No   Eligibility Readm Mgmt   Discharge diagnosis SBO (small bowel obstruction)- LAPAROTOMY EXPLORATORY, RIGHT SALPINGO OOPHORECTOMY, LYSIS OF ADHESIONS, DESCENDING COLOSOTOMY   Does the patient have one of the following disease processes/diagnoses(primary or secondary)? General Surgery   Does the patient have Home health ordered? No   Is there a DME ordered? Yes   What DME was ordered? [facility]   Prep survey completed? Yes            Kinza BERRY - Registered Nurse

## 2023-10-20 NOTE — CASE MANAGEMENT/SOCIAL WORK
Case Management Discharge Note      Final Note: I spoke with the patient and her son at the bedside regarding her discharge home today. Her [facility] will transport. [facility] to discharge. They deny having any further discharge needs at this time.         Selected Continued Care - Admitted Since 10/11/2023       Destination    No services have been selected for the patient.                Durable Medical Equipment Coordination complete.      Service Provider Selected Services Address Phone Fax Patient Preferred    [facility] [place] [facility] [address], [place] [phone] --              Dialysis/Infusion    No services have been selected for the patient.                Home Medical Care    No services have been selected for the patient.                Therapy    No services have been selected for the patient.                Community Resources    No services have been selected for the patient.                Community & DME    No services have been selected for the patient.                         Final Discharge Disposition Code: 01 - home or self-care

## 2023-10-20 NOTE — NURSING NOTE
[facility] follow up for Stoma care and assessment     Surgery date: 10/10/2023        Surgeon: Bonny     [facility] Care Outcome: Patient seen for ostomy care, assessment and education. Patient awake. Patient seen in the Med/Surg floor.  Son present at beside.     Stoma Type: Descending Colostomy  Diameter/shape: Round, 51 mm  Location: Left upper quadrant  Protrusion: Budded  Stoma Characteristics: Round, Edematous, Moist, and Pink  Peristomal skin: Clean dry and intact  Effluent Characteristics: Brown, Light, thickening      Current pouching system: Changed to [address] new image flat, drainable  Accessory products used: Stoma paste  Goal wear time: 5 to 7 days  Emptying frequency of appliance per day: Recommend emptying when 1/3-1/2 full.   Last appliance change: 10/18/2023     Follow up visit summary:  -Stoma viable, having good stool output  -No questions from an ostomy management standpoint this time.  Son feels comfortable with appliance changes.  -Plan is home today.  Discharge supplies provided and reviewed all ostomy education.  -Marked [facility] catalog with recommended ostomy appliances and excess free products.  -We will send secure start today.  -Asked patient to reach out if questions arise once home.       Homero Maloney RN, BSN, CCRN, CWOCN  Wound, Ostomy and Continence (WOC) Department  [facility]

## 2023-10-20 NOTE — PROGRESS NOTES
[facility]  IP Progress Note      Chief Complaint/Reason for service: #1 heart failure with reduced EF    Subjective   Subjective: Patient was sleeping but awakens easily.  She is lying on her left side.  She denies dyspnea or tachypnea.  No chest pain.  She tells me she is being discharged today    Past medical, surgical, social and family history reviewed in the patient's electronic medical record.    Objective     Vital Sign Min/Max for last 24 hours  Temp  Min: 97.4 °F (36.3 °C)  Max: 98 °F (36.7 °C)   BP  Min: 109/55  Max: 122/68   Pulse  Min: 92  Max: 100   Resp  Min: 16  Max: 16   SpO2  Min: 98 %  Max: 98 %   No data recorded      Intake/Output Summary (Last 24 hours) at 10/20/2023 0641  Last data filed at 10/19/2023 2000  Gross per 24 hour   Intake --   Output 900 ml   Net -900 ml             Current Facility-Administered Medications:     acetaminophen (TYLENOL) tablet 650 mg, 650 mg, Oral, Q4H PRN, Ajay Steele, PharmD    Calcium Replacement - Follow Nurse / BPA Driven Protocol, , Does not apply, PRN, Dana Draper MD    clonazePAM (KlonoPIN) tablet 0.5 mg, 0.5 mg, Oral, Q12H, Carin Comer MD, 0.5 mg at 10/19/23 2059    dextrose (D50W) (25 g/50 mL) IV injection 25 g, 25 g, Intravenous, Q15 Min PRN, Dana Draper MD, 25 g at 10/19/23 0534    dextrose (GLUTOSE) oral gel 15 g, 15 g, Oral, Q15 Min PRN, Dana Draper MD    donepezil (ARICEPT) tablet 5 mg, 5 mg, Oral, Nightly, Ajay Steele, PharmD, 5 mg at 10/19/23 2059    empagliflozin (JARDIANCE) tablet 10 mg, 10 mg, Oral, Daily, Josue Olvera MD, 10 mg at 10/19/23 0806    famotidine (PEPCID) injection 20 mg, 20 mg, Intravenous, Daily, Dana Draper MD, 20 mg at 10/19/23 0806    folic acid (FOLVITE) tablet 1 mg, 1 mg, Oral, Daily, Ajay Steele, PharmD, 1 mg at 10/19/23 0805    glucagon (GLUCAGEN) injection 1 mg, 1 mg, Intramuscular, Q15 Min PRN, Dana Draper MD    heparin  (porcine) 5000 UNIT/ML injection 5,000 Units, 5,000 Units, Subcutaneous, Q8H, Dana Draper MD, 5,000 Units at 10/20/23 0529    hydrALAZINE (APRESOLINE) injection 10 mg, 10 mg, Intravenous, Q6H PRN, Dana Draper MD    HYDROmorphone (DILAUDID) injection 0.25 mg, 0.25 mg, Intravenous, Q4H PRN, Elizabeth Dietz MD, 0.25 mg at 10/17/23 1408    insulin detemir (LEVEMIR) injection 5 Units, 5 Units, Subcutaneous, BID, Carin Comer MD, 5 Units at 10/19/23 2059    Insulin Lispro (humaLOG) injection 2-7 Units, 2-7 Units, Subcutaneous, 4x Daily AC & at Bedtime, Carin Comer MD, 2 Units at 10/19/23 1715    insulin regular (humuLIN R,novoLIN R) injection 3 Units, 3 Units, Subcutaneous, TID With Meals, Carin Comer MD, 3 Units at 10/19/23 1715    ipratropium-albuterol (DUO-NEB) nebulizer solution 3 mL, 3 mL, Nebulization, Q4H PRN, Dana Draper MD    ivabradine HCl (CORLANOR) tablet 7.5 mg, 7.5 mg, Oral, BID With Meals, Ajay Steele, PharmD, 7.5 mg at 10/19/23 1716    Magnesium Standard Dose Replacement - Follow Nurse / BPA Driven Protocol, , Does not apply, PRN, Dana Draper MD    metroNIDAZOLE (FLAGYL) IVPB 500 mg, 500 mg, Intravenous, Q8H, Carin Comer MD, Last Rate: 100 mL/hr at 10/20/23 0529, 500 mg at 10/20/23 0529    ondansetron (ZOFRAN) injection 4 mg, 4 mg, Intravenous, Q6H PRN, Dana Draper MD, 4 mg at 10/16/23 0644    phenol (CHLORASEPTIC) 1.4 % liquid 1 spray, 1 spray, Mouth/Throat, Q2H PRN, Dana Draper MD, 1 spray at 10/17/23 1535    Phosphorus Replacement - Follow Nurse / BPA Driven Protocol, , Does not apply, Bonny ZENDEJAS Hope M., MD    Potassium Replacement - Follow Nurse / BPA Driven Protocol, , Does not apply, Bonny ZENDEJAS Hope M., MD    sacubitril-valsartan (ENTRESTO) 24-26 MG tablet 1 tablet, 1 tablet, Oral, BID, Ajay Steele, PharmD, 1 tablet at 10/19/23 2103    sertraline (ZOLOFT) tablet 75 mg, 75 mg, Oral, Daily, Ajay Steele, PharmD, 75 mg  "at 10/19/23 0806    [COMPLETED] Insert Peripheral IV, , , Once **AND** sodium chloride 0.9 % flush 10 mL, 10 mL, Intravenous, PRN, Dana Draper MD    sodium chloride 0.9 % flush 10 mL, 10 mL, Intravenous, Q12H, Dana Draper MD, 10 mL at 10/19/23 2102    sodium chloride 0.9 % flush 10 mL, 10 mL, Intravenous, PRN, Dnaa Draper MD    sodium chloride 0.9 % infusion 40 mL, 40 mL, Intravenous, PRN, Dana Draper MD    spironolactone (ALDACTONE) tablet 25 mg, 25 mg, Oral, Daily, Ajay Steele PharmD, 25 mg at 10/19/23 0805    thiamine (VITAMIN B-1) tablet 100 mg, 100 mg, Oral, Daily, Ajay Steele PharmD, 100 mg at 10/19/23 0805    Physical Exam: General pleasant well-developed female with minimal resting tachypnea no overt dyspnea with a sat of 97%        HEENT: No JVP       Respiratory: Equal bilateral symmetrical expansion\" bilaterally       Cardiovascular: Regular rate and rhythm with systolic ejection murmur and 1+ pitting edema palpation       GI: Soft and flat       Lower Extremities: No lesions       Neuro: Facial expressions are symmetrical moves all 4 extremities       Skin: Warm and dry with pitting edema to palpation       Psych: Pleasant affect    Results Review: Output exceeds intake overnight by 900 mL.  Patient still net +1.8 L.  Heart rates in the 90s.  Blood pressures 109/122 potassium 3.6 GFR is down to 69    Radiology Results:  Imaging Results (Last 72 Hours)       ** No results found for the last 72 hours. **            EKG: Sinus rhythm paced ventricle    ECHO: EF 26 to 28%    Tele: Sinus rhythm    Assessment   Assessment/Plan: Acute on chronic heart failure with reduced EF-the patient is currently on Jardiance, spironolactone, Entresto and Corlanor.  She is allergic to beta-blockers..  The patient appears euvolemic.  She is being discharged today will resume her p.o. diuretics which she can start taking tomorrow.  Patient should follow-up with her usual " cardiologist    Josue Olvera MD  10/20/23  06:41 EDT

## 2023-10-21 LAB
BACTERIA SPEC AEROBE CULT: NORMAL
BACTERIA SPEC AEROBE CULT: NORMAL

## 2023-10-23 ENCOUNTER — READMISSION MANAGEMENT (OUTPATIENT)
Dept: CALL CENTER | Facility: HOSPITAL | Age: 73
End: 2023-10-23
Payer: OTHER GOVERNMENT

## 2023-10-23 NOTE — OUTREACH NOTE
General Surgery Week 1 Survey      Flowsheet Row Responses   [facility] patient discharged from? [place]   Does the patient have one of the following disease processes/diagnoses(primary or secondary)? General Surgery   Week 1 attempt successful? Yes   Call start time 1830   Call end time 1834   Discharge diagnosis SBO (small bowel obstruction)- LAPAROTOMY EXPLORATORY, RIGHT SALPINGO OOPHORECTOMY, LYSIS OF ADHESIONS, DESCENDING COLOSOTOMY   Meds reviewed with patient/caregiver? Yes   Is the patient having any side effects they believe may be caused by any medication additions or changes? No   Does the patient have all medications related to this admission filled (includes all antibiotics, pain medications, etc.) Yes   Is the patient taking all medications as directed (includes completed medication regime)? Yes   Does the patient have a follow up appointment scheduled with their surgeon? Yes   Has the patient kept scheduled appointments due by today? N/A   Comments Has appt with PCP on 10/27 and surgeon video visit on 11/1   What DME was ordered? [facility]   Has all DME been delivered? Yes   Psychosocial issues? No   Did the patient receive a copy of their discharge instructions? Yes   Nursing interventions Reviewed instructions with patient   What is the patient's perception of their health status since discharge? Improving   Nursing interventions Nurse provided patient education   Is the patient /caregiver able to teach back basic post-op care? Practice 'cough and deep breath', Continue use of incentive spirometry at least 1 week post discharge, Drive as instructed by MD in discharge instructions, No tub bath, swimming, or hot tub until instructed by MD, Keep incision areas clean,dry and protected, Lifting as instructed by MD in discharge instructions   Is the patient/caregiver able to teach back signs and symptoms of incisional infection? Increased redness, swelling or pain at the  incisonal site, Increased drainage or bleeding, Pus or odor from incision, Incisional warmth, Fever   Is the patient/caregiver able to teach back steps to recovery at home? Set small, achievable goals for return to baseline health, Rest and rebuild strength, gradually increase activity, Eat a well-balance diet, Make a list of questions for surgeon's appointment   If the patient is a current smoker, are they able to teach back resources for cessation? Not a smoker   Is the patient/caregiver able to teach back the hierarchy of who to call/visit for symptoms/problems? PCP, Specialist, Home health nurse, Urgent Care, ED, 911 Yes   Week 1 call completed? Yes   Wrap up additional comments States she is doing well.  She is unsure when she will have stitches removed and advised to contact surgeon's office   Call end time [id number]            Medina PACHECO - Licensed Nurse

## 2023-10-26 LAB — FUNGUS WND CULT: NORMAL

## 2023-10-31 ENCOUNTER — READMISSION MANAGEMENT (OUTPATIENT)
Dept: CALL CENTER | Facility: HOSPITAL | Age: 73
End: 2023-10-31
Payer: OTHER GOVERNMENT

## 2023-10-31 NOTE — OUTREACH NOTE
General Surgery Week 2 Survey      Flowsheet Row Responses   [facility] patient discharged from? [place]   Does the patient have one of the following disease processes/diagnoses(primary or secondary)? General Surgery   Week 2 attempt successful? Yes   Call start time 1053   Call end time 1057   Discharge diagnosis SBO (small bowel obstruction)- LAPAROTOMY EXPLORATORY, RIGHT SALPINGO OOPHORECTOMY, LYSIS OF ADHESIONS, DESCENDING COLOSOTOMY   Meds reviewed with patient/caregiver? Yes   Is the patient having any side effects they believe may be caused by any medication additions or changes? No   Does the patient have all medications related to this admission filled (includes all antibiotics, pain medications, etc.) No   What is keeping the patient from filling the prescriptions? --  [Pt has not received Jardiance from mail order (reports usually takes two weeks)]   Nursing Interventions Nurse provided patient education   Is the patient taking all medications as directed (includes completed medication regime)? Yes   Does the patient have a follow up appointment scheduled with their surgeon? Yes   Has the patient kept scheduled appointments due by today? Yes   Comments Pt seen Dr Nino 10/30/23--Surgeon video visit on 11/1/23   Did the patient receive a copy of their discharge instructions? Yes   Nursing interventions Reviewed instructions with patient   What is the patient's perception of their health status since discharge? Improving  [Pt doing well--has no questions or concerns today]   Nursing interventions Nurse provided patient education  [post op instructions reviewed]   Is the patient /caregiver able to teach back basic post-op care? Lifting as instructed by MD in discharge instructions, Keep incision areas clean,dry and protected, No tub bath, swimming, or hot tub until instructed by MD   Is the patient/caregiver able to teach back signs and symptoms of incisional infection? Increased redness, swelling  or pain at the incisonal site, Increased drainage or bleeding, Incisional warmth, Pus or odor from incision, Fever  [No issues with incisions]   Is the patient/caregiver able to teach back steps to recovery at home? Rest and rebuild strength, gradually increase activity   Is the patient/caregiver able to teach back the hierarchy of who to call/visit for symptoms/problems? PCP, Specialist, Home health nurse, Urgent Care, ED, 911 Yes   Week 2 call completed? Yes   Graduated Yes  [no immediate needs or concerns, appts in place]   Call end time 1057            SHANDA PAEZ - Registered Nurse

## 2023-11-01 ENCOUNTER — TELEMEDICINE (OUTPATIENT)
Dept: GYNECOLOGIC ONCOLOGY | Facility: CLINIC | Age: 73
End: 2023-11-01
Payer: OTHER GOVERNMENT

## 2023-11-01 DIAGNOSIS — Z98.890 POST-OPERATIVE STATE: Primary | ICD-10-CM

## 2023-11-01 PROCEDURE — 99024 POSTOP FOLLOW-UP VISIT: CPT | Performed by: OBSTETRICS & GYNECOLOGY

## 2023-11-02 LAB — FUNGUS WND CULT: NORMAL

## 2023-11-06 NOTE — PROGRESS NOTES
Iqra Britt  [id number]  1950      Reason for Visit:   Postoperative evaluation    History of Present Illness:  Patient is a very pleasant 72 y.o. woman who presents for a post operative evaluation status post LAPAROTOMY EXPLORATORY, RIGHT SALPINGO OOPHORECTOMY, LYSIS OF ADHESIONS, DESCENDING COLOSOTOMY performed on 10/15/2023.      Surgery and hospital course were uncomplicated.  Today, patient notes normal bowel and bladder function.  She is not having significant pain.  Her son is next to her while we talk.  She notes good ostomy output.  Staples have been removed.  She is seeing Dr. Nino in consultation and there is discussion about starting treatment.  She is trying to increase her p.o. intake.    Past Medical History, Past Surgical History, Social History, Family History have been reviewed and are without significant changes except as mentioned.    Review of Systems   All other systems were reviewed and are negative except as mentioned above.    Medications:  The current medication list was reviewed in the EMR    ALLERGIES:    Allergies   Allergen Reactions    Paxil [Paroxetine] Anxiety         There were no vitals taken for this visit.          Physical Exam  Constitutional:  Patient is a pleasant woman in no acute distress.  Gastrointestinal: Abdomen is soft and appropriately tender.  There is no mass palpated.  There is no rebound or guarding.  Incision(s) is clean, dry and intact.  Steri-Strips in place.  Ostomy of with copious output.  Extremities:  Bilateral lower extremities are non-tender.      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       ASSESSMENT/PLAN:  Iqra Britt returns for a post-operative evaluation today.  All pathology reports were discussed with the patient.      Overall, the patient is very pleased with her care.  I  recommended continuation of post operative precautions as discussed.     She is to follow-up on an as-needed basis.  She is to continue her oncology care with Dr. Nino.    Dana Draper MD  11/06/23  15:13 EST

## 2023-11-09 LAB — FUNGUS WND CULT: NORMAL

## 2023-11-16 LAB — FUNGUS WND CULT: NORMAL

## 2023-11-23 LAB — FUNGUS WND CULT: NORMAL

## 2023-11-27 ENCOUNTER — TELEPHONE (OUTPATIENT)
Dept: GYNECOLOGIC ONCOLOGY | Facility: CLINIC | Age: 73
End: 2023-11-27
Payer: OTHER GOVERNMENT

## 2023-11-27 NOTE — TELEPHONE ENCOUNTER
"Patient and her son called to speak to the RN concerning the patient. Patient stated that she has spotting of \"old blood\" last week but yesterday and today she had \"bright red spotting.\" RN reviewed that it was a small amount of spotting. RN reviewed with patient and her son that it is common for patients to have spotting for up to eight weeks after surgery. RN also offered that if the patient feels that her bleeding is not improving but increasing, or if patient is concerned and wants to be seen, she can be seen. Son stated that he has Mondays off and would like to schedule her for next week. If the spotting stops in the next week and the appointment is not needed, the son will cancel the appointment. RN transferred the call to the .  "

## 2023-12-14 ENCOUNTER — TRANSCRIBE ORDERS (OUTPATIENT)
Dept: ADMINISTRATIVE | Facility: HOSPITAL | Age: 73
End: 2023-12-14
Payer: OTHER GOVERNMENT

## 2023-12-14 DIAGNOSIS — C18.8 OVERLAPPING MALIGNANT NEOPLASM OF COLON: Primary | ICD-10-CM

## 2023-12-18 ENCOUNTER — TELEPHONE (OUTPATIENT)
Dept: INFUSION THERAPY | Facility: HOSPITAL | Age: 73
End: 2023-12-18
Payer: OTHER GOVERNMENT

## 2023-12-18 NOTE — TELEPHONE ENCOUNTER
Pt contacted as pre-procedure phone call prior to planned port placement for 12/19/23. Reviewed with patient arrival time, location, nothing to eat or drink by mouth, okay to take morning medications the day of procedure with a small sip of water,  needed, reviewed procedure instructions and allowed time for questions, and reviewed home medications, allergies, and medical history.

## 2023-12-19 ENCOUNTER — HOSPITAL ENCOUNTER (OUTPATIENT)
Dept: INTERVENTIONAL RADIOLOGY/VASCULAR | Facility: HOSPITAL | Age: 73
Discharge: HOME | End: 2023-12-19
Payer: MEDICARE

## 2023-12-19 VITALS
RESPIRATION RATE: 14 BRPM | OXYGEN SATURATION: 100 % | DIASTOLIC BLOOD PRESSURE: 60 MMHG | HEART RATE: 92 BPM | SYSTOLIC BLOOD PRESSURE: 114 MMHG

## 2023-12-19 DIAGNOSIS — C78.7 METASTATIC COLON CANCER TO LIVER: ICD-10-CM

## 2023-12-19 DIAGNOSIS — C18.9 METASTATIC COLON CANCER TO LIVER: ICD-10-CM

## 2023-12-19 LAB
BASOPHILS # BLD AUTO: 0.02 10*3/MM3 (ref 0–0.2)
BASOPHILS NFR BLD AUTO: 0.3 % (ref 0–1.5)
DEPRECATED RDW RBC AUTO: 55.2 FL (ref 37–54)
EOSINOPHIL # BLD AUTO: 0.13 10*3/MM3 (ref 0–0.4)
EOSINOPHIL NFR BLD AUTO: 2 % (ref 0.3–6.2)
ERYTHROCYTE [DISTWIDTH] IN BLOOD BY AUTOMATED COUNT: 17.9 % (ref 12.3–15.4)
GLUCOSE BLDC GLUCOMTR-MCNC: 152 MG/DL (ref 70–130)
HCT VFR BLD AUTO: 30.2 % (ref 34–46.6)
HGB BLD-MCNC: 9.1 G/DL (ref 12–15.9)
IMM GRANULOCYTES # BLD AUTO: 0.11 10*3/MM3 (ref 0–0.05)
IMM GRANULOCYTES NFR BLD AUTO: 1.7 % (ref 0–0.5)
INR PPP: 1.11 (ref 0.89–1.12)
LYMPHOCYTES # BLD AUTO: 1.12 10*3/MM3 (ref 0.7–3.1)
LYMPHOCYTES NFR BLD AUTO: 17.2 % (ref 19.6–45.3)
MCH RBC QN AUTO: 25.8 PG (ref 26.6–33)
MCHC RBC AUTO-ENTMCNC: 30.1 G/DL (ref 31.5–35.7)
MCV RBC AUTO: 85.6 FL (ref 79–97)
MONOCYTES # BLD AUTO: 0.5 10*3/MM3 (ref 0.1–0.9)
MONOCYTES NFR BLD AUTO: 7.7 % (ref 5–12)
NEUTROPHILS NFR BLD AUTO: 4.65 10*3/MM3 (ref 1.7–7)
NEUTROPHILS NFR BLD AUTO: 71.1 % (ref 42.7–76)
NRBC BLD AUTO-RTO: 0 /100 WBC (ref 0–0.2)
PLATELET # BLD AUTO: 384 10*3/MM3 (ref 140–450)
PMV BLD AUTO: 11 FL (ref 6–12)
PROTHROMBIN TIME: 14.4 SECONDS (ref 12.2–14.5)
RBC # BLD AUTO: 3.53 10*6/MM3 (ref 3.77–5.28)
WBC NRBC COR # BLD AUTO: 6.53 10*3/MM3 (ref 3.4–10.8)

## 2023-12-19 PROCEDURE — C1788 PORT, INDWELLING, IMP: HCPCS

## 2023-12-19 PROCEDURE — 82948 REAGENT STRIP/BLOOD GLUCOSE: CPT

## 2023-12-19 PROCEDURE — 25010000002 HEPARIN LOCK FLUSH PER 10 UNITS

## 2023-12-19 PROCEDURE — 25010000002 MIDAZOLAM PER 1 MG: Performed by: RADIOLOGY

## 2023-12-19 PROCEDURE — 85025 COMPLETE CBC W/AUTO DIFF WBC: CPT | Performed by: RADIOLOGY

## 2023-12-19 PROCEDURE — 99153 MOD SED SAME PHYS/QHP EA: CPT

## 2023-12-19 PROCEDURE — 25010000002 FENTANYL CITRATE (PF) 50 MCG/ML SOLUTION: Performed by: RADIOLOGY

## 2023-12-19 PROCEDURE — 99152 MOD SED SAME PHYS/QHP 5/>YRS: CPT

## 2023-12-19 PROCEDURE — 76942 ECHO GUIDE FOR BIOPSY: CPT

## 2023-12-19 PROCEDURE — 25010000002 LIDOCAINE 1 % SOLUTION: Performed by: INTERNAL MEDICINE

## 2023-12-19 PROCEDURE — 77002 NEEDLE LOCALIZATION BY XRAY: CPT

## 2023-12-19 PROCEDURE — 25010000002 CEFAZOLIN PER 500 MG: Performed by: RADIOLOGY

## 2023-12-19 PROCEDURE — 85610 PROTHROMBIN TIME: CPT | Performed by: RADIOLOGY

## 2023-12-19 RX ORDER — HEPARIN SODIUM (PORCINE) LOCK FLUSH IV SOLN 100 UNIT/ML 100 UNIT/ML
SOLUTION INTRAVENOUS
Status: COMPLETED
Start: 2023-12-19 | End: 2023-12-19

## 2023-12-19 RX ORDER — FENTANYL CITRATE 50 UG/ML
INJECTION, SOLUTION INTRAMUSCULAR; INTRAVENOUS
Status: DISPENSED
Start: 2023-12-19 | End: 2023-12-19

## 2023-12-19 RX ORDER — SODIUM CHLORIDE 0.9 % (FLUSH) 0.9 %
10 SYRINGE (ML) INJECTION EVERY 12 HOURS SCHEDULED
Status: DISCONTINUED | OUTPATIENT
Start: 2023-12-19 | End: 2023-12-20 | Stop reason: HOSPADM

## 2023-12-19 RX ORDER — SODIUM CHLORIDE 0.9 % (FLUSH) 0.9 %
10 SYRINGE (ML) INJECTION AS NEEDED
Status: DISCONTINUED | OUTPATIENT
Start: 2023-12-19 | End: 2023-12-20 | Stop reason: HOSPADM

## 2023-12-19 RX ORDER — FENTANYL CITRATE 50 UG/ML
INJECTION, SOLUTION INTRAMUSCULAR; INTRAVENOUS AS NEEDED
Status: COMPLETED | OUTPATIENT
Start: 2023-12-19 | End: 2023-12-19

## 2023-12-19 RX ORDER — MIDAZOLAM HYDROCHLORIDE 1 MG/ML
INJECTION INTRAMUSCULAR; INTRAVENOUS AS NEEDED
Status: COMPLETED | OUTPATIENT
Start: 2023-12-19 | End: 2023-12-19

## 2023-12-19 RX ORDER — SODIUM CHLORIDE 9 MG/ML
40 INJECTION, SOLUTION INTRAVENOUS AS NEEDED
Status: DISCONTINUED | OUTPATIENT
Start: 2023-12-19 | End: 2023-12-20 | Stop reason: HOSPADM

## 2023-12-19 RX ORDER — MIDAZOLAM HYDROCHLORIDE 1 MG/ML
INJECTION INTRAMUSCULAR; INTRAVENOUS
Status: DISPENSED
Start: 2023-12-19 | End: 2023-12-19

## 2023-12-19 RX ORDER — LIDOCAINE HYDROCHLORIDE 10 MG/ML
10 INJECTION, SOLUTION INFILTRATION; PERINEURAL ONCE
Status: COMPLETED | OUTPATIENT
Start: 2023-12-19 | End: 2023-12-19

## 2023-12-19 RX ORDER — LIDOCAINE HYDROCHLORIDE AND EPINEPHRINE 10; 10 MG/ML; UG/ML
9 INJECTION, SOLUTION INFILTRATION; PERINEURAL ONCE
Status: COMPLETED | OUTPATIENT
Start: 2023-12-19 | End: 2023-12-19

## 2023-12-19 RX ORDER — CEFAZOLIN SODIUM 1 G/50ML
1000 SOLUTION INTRAVENOUS ONCE
Status: DISCONTINUED | OUTPATIENT
Start: 2023-12-19 | End: 2023-12-20 | Stop reason: HOSPADM

## 2023-12-19 RX ADMIN — HEPARIN 500 UNITS: 100 SYRINGE at 11:16

## 2023-12-19 RX ADMIN — CEFAZOLIN SODIUM 1000 MG: 1 INJECTION, POWDER, FOR SOLUTION INTRAMUSCULAR; INTRAVENOUS at 11:47

## 2023-12-19 RX ADMIN — LIDOCAINE HYDROCHLORIDE,EPINEPHRINE BITARTRATE 9 ML: 10; .01 INJECTION, SOLUTION INFILTRATION; PERINEURAL at 11:16

## 2023-12-19 RX ADMIN — MIDAZOLAM HYDROCHLORIDE 1 MG: 1 INJECTION, SOLUTION INTRAMUSCULAR; INTRAVENOUS at 11:10

## 2023-12-19 RX ADMIN — FENTANYL CITRATE 50 MCG: 50 INJECTION, SOLUTION INTRAMUSCULAR; INTRAVENOUS at 11:05

## 2023-12-19 RX ADMIN — MIDAZOLAM HYDROCHLORIDE 1 MG: 1 INJECTION, SOLUTION INTRAMUSCULAR; INTRAVENOUS at 11:05

## 2023-12-19 RX ADMIN — LIDOCAINE HYDROCHLORIDE 4 ML: 10 INJECTION, SOLUTION INFILTRATION; PERINEURAL at 11:15

## 2023-12-19 NOTE — PRE-PROCEDURE NOTE
.  [facility]   Interventional Radiology H&P    Patient Name: Iqra Britt  : 1950  MRN: [id number]  Primary Care Physician:  Georgie Fernández DO  Referring Physician: Bobby Nino MD  Date of admission: 2023    Subjective   Subjective     HPI:  Iqra Britt is a 73 y.o. female with Metastatic Colon CA    Review of Systems:   Constitutional no fever,  no weight loss       Otolaryngeal no difficulty swallowing   Cardiovascular no chest pain   Pulmonary no cough, no sputum production   Gastrointestinal no constipation, no diarrhea                         Personal History       Past Medical/Surgical History:   Past Medical History:   Diagnosis Date    Arthritis     Cancer 10/2023    colon CA    CHF (congestive heart failure)     COPD (chronic obstructive pulmonary disease)     Diabetes mellitus     Hyperlipidemia     Renal disorder      Past Surgical History:   Procedure Laterality Date    CARDIAC DEFIBRILLATOR PLACEMENT      CHOLECYSTECTOMY      DILATATION AND CURETTAGE      EXPLORATORY LAPAROTOMY N/A 10/15/2023    Procedure: LAPAROTOMY EXPLORATORY, RIGHT SALPINGO OOPHORECTOMY, LYSIS OF ADHESIONS, DESCENDING COLOSOTOMY;  Surgeon: Dana Draper MD;  Location: [facility];  Service: Gynecology Oncology;  Laterality: N/A;    PACEMAKER IMPLANTATION      battery replaced x 2 last one        Social History:  reports that she quit smoking about 35 years ago. Her smoking use included cigarettes. She has never used smokeless tobacco. She reports that she does not currently use alcohol. She reports that she does not use drugs.    Medications:  (Not in a hospital admission)    Current medications:  fentaNYL citrate (PF), , ,   heparin, , ,   midazolam, , ,   sodium chloride, 10 mL, Intravenous, Q12H      Current IV drips:       Allergies:  Allergies   Allergen Reactions    Erythromycin Anxiety    Paxil [Paroxetine] Anxiety       Objective    Objective     Vitals:     "      Physical Exam:   Constitutional: Awake, alert, No acute distress    Respiratory: Clear to auscultation bilaterally, nonlabored respirations    Cardiovascular: RRR, no murmurs, rubs, or gallops, palpable pedal pulses bilaterally   Gastrointestinal: Positive bowel sounds, soft, nontender, nondistended        ASA SCALE ASSESSMENT:  2-Mild to moderate systemic disease, medically well controlled, with no functional limitation    MALLAMPATI CLASSIFICATION:  2-Able to visualize the soft palate, fauces, uvula. The anterior & posterior tonsilar pillars are hidden by the tongue.       Result Review        Result Review:     No results found for: \"NA\"    No results found for: \"K\"    No results found for: \"CL\"    No results found for: \"PLASMABICARB\"    No results found for: \"BUN\"    No results found for: \"CREATININE\"    No results found for: \"CALCIUM\"        No components found for: \"GLUCOSE.*\"  Results from last 7 days   Lab Units 12/19/23  1023   WBC 10*3/mm3 6.53   HEMOGLOBIN g/dL 9.1*   HEMATOCRIT % 30.2*   PLATELETS 10*3/mm3 384      Results from last 7 days   Lab Units 12/19/23  1023   INR  1.11           Assessment / Plan     Assesment:   Metastatic Colon CA      Plan:   Port placement    The risks and benefits of the procedure were discussed with the patient.    Electronically signed by Darron Moreno III, MD, 12/19/23, 10:46 AM EST.   "

## 2023-12-19 NOTE — NURSING NOTE
Image guided 8 Arabic Smartport placed to Right IJ by Dr Moreno in Interventional Radiology.Patient tolerated procedure well. Patient given 50 mcg Fentanyl & 2 mg Versed with a sedation time of 31 minutes. Report called to jenn Clements in [place].

## 2023-12-19 NOTE — DISCHARGE INSTRUCTIONS
Notify physician if you see redness, drainage or swelling at the site or have a fever greater than 101.0.    Resume regular medications and diet,

## 2024-01-08 ENCOUNTER — HOSPITAL ENCOUNTER (OUTPATIENT)
Dept: PET IMAGING | Facility: HOSPITAL | Age: 74
Discharge: HOME | End: 2024-01-08
Payer: MEDICARE

## 2024-01-08 DIAGNOSIS — C18.8 OVERLAPPING MALIGNANT NEOPLASM OF COLON: ICD-10-CM

## 2024-01-08 LAB — GLUCOSE BLDC GLUCOMTR-MCNC: 158 MG/DL (ref 70–130)

## 2024-01-08 PROCEDURE — 78815 PET IMAGE W/CT SKULL-THIGH: CPT

## 2024-01-08 PROCEDURE — 82948 REAGENT STRIP/BLOOD GLUCOSE: CPT

## 2024-01-08 PROCEDURE — A9552 F18 FDG: HCPCS | Performed by: INTERNAL MEDICINE

## 2024-01-08 PROCEDURE — 0 FLUDEOXYGLUCOSE F18 SOLUTION: Performed by: INTERNAL MEDICINE

## 2024-01-08 RX ADMIN — FLUDEOXYGLUCOSE F 18 1 DOSE: 200 INJECTION, SOLUTION INTRAVENOUS at 11:50

## 2024-04-23 ENCOUNTER — TRANSCRIBE ORDERS (OUTPATIENT)
Dept: ADMINISTRATIVE | Facility: HOSPITAL | Age: 74
End: 2024-04-23
Payer: OTHER GOVERNMENT

## 2024-04-23 DIAGNOSIS — C18.8 MALIGNANT NEOPLASM OF OVERLAPPING SITES OF COLON: Primary | ICD-10-CM

## 2024-05-14 ENCOUNTER — HOSPITAL ENCOUNTER (OUTPATIENT)
Facility: HOSPITAL | Age: 74
Discharge: HOME | End: 2024-05-14
Payer: MEDICARE

## 2024-05-14 DIAGNOSIS — C18.8 MALIGNANT NEOPLASM OF OVERLAPPING SITES OF COLON: ICD-10-CM

## 2024-05-14 LAB — GLUCOSE BLDC GLUCOMTR-MCNC: 184 MG/DL (ref 70–130)

## 2024-05-14 PROCEDURE — 78815 PET IMAGE W/CT SKULL-THIGH: CPT

## 2024-05-14 PROCEDURE — 0 FLUDEOXYGLUCOSE F18 SOLUTION: Performed by: INTERNAL MEDICINE

## 2024-05-14 PROCEDURE — 82948 REAGENT STRIP/BLOOD GLUCOSE: CPT

## 2024-05-14 PROCEDURE — A9552 F18 FDG: HCPCS | Performed by: INTERNAL MEDICINE

## 2024-05-14 RX ADMIN — FLUDEOXYGLUCOSE F18 1 DOSE: 300 INJECTION INTRAVENOUS at 08:07

## 2024-08-05 ENCOUNTER — TRANSCRIBE ORDERS (OUTPATIENT)
Dept: ADMINISTRATIVE | Facility: HOSPITAL | Age: 74
End: 2024-08-05
Payer: OTHER GOVERNMENT

## 2024-08-05 DIAGNOSIS — C77.2 SECONDARY MALIGNANT NEOPLASM OF INTRA-ABDOMINAL LYMPH NODES: Primary | ICD-10-CM

## 2024-08-05 DIAGNOSIS — C18.8 MALIGNANT NEOPLASM OF OVERLAPPING SITES OF COLON: ICD-10-CM

## 2024-08-19 ENCOUNTER — HOSPITAL ENCOUNTER (OUTPATIENT)
Facility: HOSPITAL | Age: 74
Discharge: HOME | End: 2024-08-19
Payer: MEDICARE

## 2024-08-19 DIAGNOSIS — C77.2 SECONDARY MALIGNANT NEOPLASM OF INTRA-ABDOMINAL LYMPH NODES: ICD-10-CM

## 2024-08-19 DIAGNOSIS — C18.8 MALIGNANT NEOPLASM OF OVERLAPPING SITES OF COLON: ICD-10-CM

## 2024-08-19 LAB — GLUCOSE BLDC GLUCOMTR-MCNC: 119 MG/DL (ref 70–130)

## 2024-08-19 PROCEDURE — 78815 PET IMAGE W/CT SKULL-THIGH: CPT

## 2024-08-19 PROCEDURE — 82948 REAGENT STRIP/BLOOD GLUCOSE: CPT

## 2024-08-19 PROCEDURE — A9552 F18 FDG: HCPCS | Performed by: NURSE PRACTITIONER

## 2024-08-19 PROCEDURE — 0 FLUDEOXYGLUCOSE F18 SOLUTION: Performed by: NURSE PRACTITIONER

## 2024-08-19 RX ADMIN — FLUDEOXYGLUCOSE F18 1 DOSE: 300 INJECTION INTRAVENOUS at 08:08

## 2024-09-24 ENCOUNTER — OFFICE VISIT (OUTPATIENT)
Dept: GYNECOLOGIC ONCOLOGY | Facility: CLINIC | Age: 74
End: 2024-09-24
Payer: MEDICARE

## 2024-09-24 VITALS
BODY MASS INDEX: 21.94 KG/M2 | HEART RATE: 72 BPM | OXYGEN SATURATION: 98 % | HEIGHT: 61 IN | TEMPERATURE: 97.5 F | SYSTOLIC BLOOD PRESSURE: 142 MMHG | WEIGHT: 116.2 LBS | RESPIRATION RATE: 18 BRPM | DIASTOLIC BLOOD PRESSURE: 63 MMHG

## 2024-09-24 DIAGNOSIS — C80.1 ADENOCARCINOMA OF UNKNOWN PRIMARY: Primary | ICD-10-CM

## 2024-09-24 PROCEDURE — 1159F MED LIST DOCD IN RCRD: CPT | Performed by: OBSTETRICS & GYNECOLOGY

## 2024-09-24 PROCEDURE — 99215 OFFICE O/P EST HI 40 MIN: CPT | Performed by: OBSTETRICS & GYNECOLOGY

## 2024-09-24 PROCEDURE — 1126F AMNT PAIN NOTED NONE PRSNT: CPT | Performed by: OBSTETRICS & GYNECOLOGY

## 2024-09-24 PROCEDURE — 1160F RVW MEDS BY RX/DR IN RCRD: CPT | Performed by: OBSTETRICS & GYNECOLOGY

## 2024-09-27 ENCOUNTER — TELEPHONE (OUTPATIENT)
Dept: GYNECOLOGIC ONCOLOGY | Facility: CLINIC | Age: 74
End: 2024-09-27
Payer: OTHER GOVERNMENT

## 2025-01-16 ENCOUNTER — TRANSCRIBE ORDERS (OUTPATIENT)
Dept: ADMINISTRATIVE | Facility: HOSPITAL | Age: 75
End: 2025-01-16
Payer: OTHER GOVERNMENT

## 2025-01-16 DIAGNOSIS — C18.8 MALIGNANT NEOPLASM OF OVERLAPPING SITES OF COLON: Primary | ICD-10-CM

## 2025-02-03 ENCOUNTER — HOSPITAL ENCOUNTER (OUTPATIENT)
Facility: HOSPITAL | Age: 75
Discharge: HOME | End: 2025-02-03
Payer: MEDICARE

## 2025-02-03 DIAGNOSIS — C18.8 MALIGNANT NEOPLASM OF OVERLAPPING SITES OF COLON: ICD-10-CM

## 2025-02-03 LAB — GLUCOSE BLDC GLUCOMTR-MCNC: 100 MG/DL (ref 70–130)

## 2025-02-03 PROCEDURE — 34310000005 FLUDEOXYGLUCOSE F18 SOLUTION: Performed by: INTERNAL MEDICINE

## 2025-02-03 PROCEDURE — 78815 PET IMAGE W/CT SKULL-THIGH: CPT

## 2025-02-03 PROCEDURE — 82948 REAGENT STRIP/BLOOD GLUCOSE: CPT

## 2025-02-03 PROCEDURE — A9552 F18 FDG: HCPCS | Performed by: INTERNAL MEDICINE

## 2025-02-03 RX ADMIN — FLUDEOXYGLUCOSE F18 1 DOSE: 300 INJECTION INTRAVENOUS at 08:06

## 2025-05-08 ENCOUNTER — TRANSCRIBE ORDERS (OUTPATIENT)
Dept: ADMINISTRATIVE | Facility: HOSPITAL | Age: 75
End: 2025-05-08
Payer: OTHER GOVERNMENT

## 2025-05-08 DIAGNOSIS — C18.8 MALIGNANT NEOPLASM OF OVERLAPPING SITES OF COLON: ICD-10-CM

## 2025-05-08 DIAGNOSIS — C79.60: ICD-10-CM

## 2025-05-08 DIAGNOSIS — C18.9: ICD-10-CM

## 2025-05-08 DIAGNOSIS — C79.60 MALIGNANT NEOPLASM METASTATIC TO OVARY, UNSPECIFIED LATERALITY: Primary | ICD-10-CM

## 2025-06-02 ENCOUNTER — HOSPITAL ENCOUNTER (OUTPATIENT)
Facility: HOSPITAL | Age: 75
Discharge: HOME | End: 2025-06-02
Payer: MEDICARE

## 2025-06-02 DIAGNOSIS — C18.9: ICD-10-CM

## 2025-06-02 DIAGNOSIS — C79.60: ICD-10-CM

## 2025-06-02 DIAGNOSIS — C79.60 MALIGNANT NEOPLASM METASTATIC TO OVARY, UNSPECIFIED LATERALITY: ICD-10-CM

## 2025-06-02 DIAGNOSIS — C18.8 MALIGNANT NEOPLASM OF OVERLAPPING SITES OF COLON: ICD-10-CM

## 2025-06-02 LAB — GLUCOSE BLDC GLUCOMTR-MCNC: 137 MG/DL (ref 70–130)

## 2025-06-02 PROCEDURE — 78815 PET IMAGE W/CT SKULL-THIGH: CPT

## 2025-06-02 PROCEDURE — 82948 REAGENT STRIP/BLOOD GLUCOSE: CPT

## 2025-06-02 PROCEDURE — 34310000005 FLUDEOXYGLUCOSE F18 SOLUTION: Performed by: INTERNAL MEDICINE

## 2025-06-02 PROCEDURE — A9552 F18 FDG: HCPCS | Performed by: INTERNAL MEDICINE

## 2025-06-02 RX ADMIN — FLUDEOXYGLUCOSE F18 1 DOSE: 300 INJECTION INTRAVENOUS at 08:09

## 2025-08-12 ENCOUNTER — TRANSCRIBE ORDERS (OUTPATIENT)
Dept: ADMINISTRATIVE | Facility: HOSPITAL | Age: 75
End: 2025-08-12
Payer: OTHER GOVERNMENT

## 2025-08-12 DIAGNOSIS — C79.60 MALIGNANT NEOPLASM METASTATIC TO OVARY, UNSPECIFIED LATERALITY: Primary | ICD-10-CM

## 2025-08-12 DIAGNOSIS — C18.9 MALIGNANT NEOPLASM OF COLON, UNSPECIFIED PART OF COLON: ICD-10-CM

## 2025-08-12 DIAGNOSIS — C18.8 MALIGNANT NEOPLASM OF OVERLAPPING SITES OF COLON: ICD-10-CM

## (undated) DEVICE — PREMIUM DRY TRAY LF: Brand: MEDLINE INDUSTRIES, INC.

## (undated) DEVICE — SUT PDS 0 CTX 36IN VIO PDP370T

## (undated) DEVICE — DRSNG WND BORDR/ADHS NONADHR/GZ LF 4X10IN STRL

## (undated) DEVICE — TRAP FLD MINIVAC MEGADYNE 100ML

## (undated) DEVICE — SPNG LAP PREWSH SFTPK 18X18IN STRL PK/5

## (undated) DEVICE — GLV SURG DERMASSURE GRN LF PF SZ 6.5

## (undated) DEVICE — DRAIN JACKSON PRATT ROUND 15FR: Brand: CARDINAL HEALTH

## (undated) DEVICE — LEX BASIC NO DRAPE: Brand: MEDLINE INDUSTRIES, INC.

## (undated) DEVICE — DRAPE, LAVH, STERILE: Brand: MEDLINE

## (undated) DEVICE — PATIENT RETURN ELECTRODE, SINGLE-USE, CONTACT QUALITY MONITORING, ADULT, WITH 9FT CORD, FOR PATIENTS WEIGING OVER 33LBS. (15KG): Brand: MEGADYNE

## (undated) DEVICE — JACKSON-PRATT 100CC BULB RESERVOIR: Brand: CARDINAL HEALTH

## (undated) DEVICE — ADHS SKIN PREMIERPRO EXOFIN TOPICAL HI/VISC .5ML

## (undated) DEVICE — 3M™ WARMING BLANKET, UPPER BODY, 10 PER CASE, 42268: Brand: BAIR HUGGER™

## (undated) DEVICE — TBG PENCL TELESCP MEGADYNE SMOKE EVAC 10FT

## (undated) DEVICE — SUT PDS LP 1 TP1 96IN VIO PDP880GA

## (undated) DEVICE — SUT VIC 12X27 D8116 BX/12

## (undated) DEVICE — SUT MONOCRYL PLS ANTIB UND 3/0  PS1 27IN

## (undated) DEVICE — GLV SURG SENSICARE PI MIC PF SZ6 LF STRL

## (undated) DEVICE — DRAPE,UNDERBUTTOCKS,STERILE: Brand: MEDLINE

## (undated) DEVICE — SUT VICYL PLS CTD ANTIB BR 1 27IN VIL

## (undated) DEVICE — ANTIBACTERIAL UNDYED BRAIDED (POLYGLACTIN 910), SYNTHETIC ABSORBABLE SUTURE: Brand: COATED VICRYL

## (undated) DEVICE — PCH INST SURG INVISISHIELD 2PCKT